# Patient Record
Sex: FEMALE | Race: WHITE | NOT HISPANIC OR LATINO | Employment: OTHER | URBAN - METROPOLITAN AREA
[De-identification: names, ages, dates, MRNs, and addresses within clinical notes are randomized per-mention and may not be internally consistent; named-entity substitution may affect disease eponyms.]

---

## 2017-01-09 ENCOUNTER — TRANSCRIBE ORDERS (OUTPATIENT)
Dept: ADMINISTRATIVE | Facility: HOSPITAL | Age: 67
End: 2017-01-09

## 2017-01-09 DIAGNOSIS — R01.1 HEART MURMUR: Primary | ICD-10-CM

## 2017-01-13 ENCOUNTER — HOSPITAL ENCOUNTER (OUTPATIENT)
Dept: NON INVASIVE DIAGNOSTICS | Facility: HOSPITAL | Age: 67
Discharge: HOME | End: 2017-01-13
Attending: INTERNAL MEDICINE
Payer: COMMERCIAL

## 2017-01-13 DIAGNOSIS — R01.1 HEART MURMUR: ICD-10-CM

## 2017-01-13 PROCEDURE — 93306 TTE W/DOPPLER COMPLETE: CPT

## 2017-02-18 ENCOUNTER — APPOINTMENT (OUTPATIENT)
Dept: LAB | Facility: HOSPITAL | Age: 67
End: 2017-02-18
Payer: COMMERCIAL

## 2017-02-18 ENCOUNTER — TRANSCRIBE ORDERS (OUTPATIENT)
Dept: ADMINISTRATIVE | Facility: HOSPITAL | Age: 67
End: 2017-02-18

## 2017-02-18 DIAGNOSIS — R73.9 HYPERGLYCEMIA: ICD-10-CM

## 2017-02-18 DIAGNOSIS — E78.5 HYPERLIPIDEMIA: ICD-10-CM

## 2017-02-18 DIAGNOSIS — Z12.31 OTHER SCREENING MAMMOGRAM: Primary | ICD-10-CM

## 2017-02-18 DIAGNOSIS — I10 ESSENTIAL (PRIMARY) HYPERTENSION: ICD-10-CM

## 2017-02-18 DIAGNOSIS — M17.11 PRIMARY OSTEOARTHRITIS OF RIGHT KNEE: ICD-10-CM

## 2017-02-18 DIAGNOSIS — E55.9 VITAMIN D DEFICIENCY: ICD-10-CM

## 2017-02-18 LAB
25(OH)D3 SERPL-MCNC: 12.9 NG/ML (ref 30–100)
ALBUMIN SERPL BCP-MCNC: 3.5 G/DL (ref 3.5–5)
ALP SERPL-CCNC: 110 U/L (ref 46–116)
ALT SERPL W P-5'-P-CCNC: 18 U/L (ref 12–78)
ANION GAP SERPL CALCULATED.3IONS-SCNC: 8 MMOL/L (ref 4–13)
AST SERPL W P-5'-P-CCNC: 16 U/L (ref 5–45)
BASOPHILS # BLD AUTO: 0.02 THOUSANDS/ΜL (ref 0–0.1)
BASOPHILS NFR BLD AUTO: 0 % (ref 0–1)
BILIRUB SERPL-MCNC: 0.4 MG/DL (ref 0.2–1)
BUN SERPL-MCNC: 20 MG/DL (ref 5–25)
CALCIUM SERPL-MCNC: 9.4 MG/DL (ref 8.3–10.1)
CHLORIDE SERPL-SCNC: 105 MMOL/L (ref 100–108)
CHOLEST SERPL-MCNC: 225 MG/DL (ref 50–200)
CO2 SERPL-SCNC: 30 MMOL/L (ref 21–32)
CREAT SERPL-MCNC: 0.95 MG/DL (ref 0.6–1.3)
EOSINOPHIL # BLD AUTO: 0.17 THOUSAND/ΜL (ref 0–0.61)
EOSINOPHIL NFR BLD AUTO: 3 % (ref 0–6)
ERYTHROCYTE [DISTWIDTH] IN BLOOD BY AUTOMATED COUNT: 13.9 % (ref 11.6–15.1)
EST. AVERAGE GLUCOSE BLD GHB EST-MCNC: 137 MG/DL
GFR SERPL CREATININE-BSD FRML MDRD: 58.5 ML/MIN/1.73SQ M
GLUCOSE SERPL-MCNC: 110 MG/DL (ref 65–140)
HBA1C MFR BLD: 6.4 % (ref 4.2–6.3)
HCT VFR BLD AUTO: 42.2 % (ref 34.8–46.1)
HDLC SERPL-MCNC: 53 MG/DL (ref 40–60)
HGB BLD-MCNC: 13.4 G/DL (ref 11.5–15.4)
LDLC SERPL CALC-MCNC: 139 MG/DL (ref 0–100)
LYMPHOCYTES # BLD AUTO: 1.46 THOUSANDS/ΜL (ref 0.6–4.47)
LYMPHOCYTES NFR BLD AUTO: 21 % (ref 14–44)
MCH RBC QN AUTO: 29.3 PG (ref 26.8–34.3)
MCHC RBC AUTO-ENTMCNC: 31.8 G/DL (ref 31.4–37.4)
MCV RBC AUTO: 92 FL (ref 82–98)
MONOCYTES # BLD AUTO: 0.46 THOUSAND/ΜL (ref 0.17–1.22)
MONOCYTES NFR BLD AUTO: 7 % (ref 4–12)
NEUTROPHILS # BLD AUTO: 4.76 THOUSANDS/ΜL (ref 1.85–7.62)
NEUTS SEG NFR BLD AUTO: 69 % (ref 43–75)
PLATELET # BLD AUTO: 284 THOUSANDS/UL (ref 149–390)
PMV BLD AUTO: 9.8 FL (ref 8.9–12.7)
POTASSIUM SERPL-SCNC: 4.6 MMOL/L (ref 3.5–5.3)
PROT SERPL-MCNC: 6.9 G/DL (ref 6.4–8.2)
RBC # BLD AUTO: 4.58 MILLION/UL (ref 3.81–5.12)
SODIUM SERPL-SCNC: 143 MMOL/L (ref 136–145)
TRIGL SERPL-MCNC: 165 MG/DL
TSH SERPL DL<=0.05 MIU/L-ACNC: 2.77 UIU/ML (ref 0.36–3.74)
WBC # BLD AUTO: 6.87 THOUSAND/UL (ref 4.31–10.16)

## 2017-02-18 PROCEDURE — 36415 COLL VENOUS BLD VENIPUNCTURE: CPT

## 2017-02-18 PROCEDURE — 80053 COMPREHEN METABOLIC PANEL: CPT

## 2017-02-18 PROCEDURE — 83036 HEMOGLOBIN GLYCOSYLATED A1C: CPT

## 2017-02-18 PROCEDURE — 80061 LIPID PANEL: CPT

## 2017-02-18 PROCEDURE — 85025 COMPLETE CBC W/AUTO DIFF WBC: CPT

## 2017-02-18 PROCEDURE — 82306 VITAMIN D 25 HYDROXY: CPT

## 2017-02-18 PROCEDURE — 84443 ASSAY THYROID STIM HORMONE: CPT

## 2017-02-19 ENCOUNTER — GENERIC CONVERSION - ENCOUNTER (OUTPATIENT)
Dept: OTHER | Facility: OTHER | Age: 67
End: 2017-02-19

## 2017-02-27 ENCOUNTER — HOSPITAL ENCOUNTER (OUTPATIENT)
Dept: MAMMOGRAPHY | Facility: HOSPITAL | Age: 67
Discharge: HOME | End: 2017-02-27
Payer: COMMERCIAL

## 2017-02-27 DIAGNOSIS — Z12.31 ENCOUNTER FOR SCREENING MAMMOGRAM FOR MALIGNANT NEOPLASM OF BREAST: ICD-10-CM

## 2017-02-27 DIAGNOSIS — Z12.31 OTHER SCREENING MAMMOGRAM: ICD-10-CM

## 2017-02-27 DIAGNOSIS — I10 ESSENTIAL (PRIMARY) HYPERTENSION: ICD-10-CM

## 2017-02-27 DIAGNOSIS — E78.5 HYPERLIPIDEMIA: ICD-10-CM

## 2017-02-27 DIAGNOSIS — G47.00 INSOMNIA: ICD-10-CM

## 2017-02-27 DIAGNOSIS — R53.83 OTHER FATIGUE: ICD-10-CM

## 2017-02-27 DIAGNOSIS — R73.02 IMPAIRED GLUCOSE TOLERANCE: ICD-10-CM

## 2017-02-27 DIAGNOSIS — E55.9 VITAMIN D DEFICIENCY: ICD-10-CM

## 2017-02-27 PROCEDURE — G0202 SCR MAMMO BI INCL CAD: HCPCS

## 2017-02-27 PROCEDURE — 77063 BREAST TOMOSYNTHESIS BI: CPT

## 2017-03-02 ENCOUNTER — GENERIC CONVERSION - ENCOUNTER (OUTPATIENT)
Dept: OTHER | Facility: OTHER | Age: 67
End: 2017-03-02

## 2017-03-06 ENCOUNTER — ALLSCRIPTS OFFICE VISIT (OUTPATIENT)
Dept: OTHER | Facility: OTHER | Age: 67
End: 2017-03-06

## 2017-03-13 LAB
LEFT EYE DIABETIC RETINOPATHY: NORMAL
RIGHT EYE DIABETIC RETINOPATHY: NORMAL

## 2017-04-03 ENCOUNTER — LAB REQUISITION (OUTPATIENT)
Dept: LAB | Facility: HOSPITAL | Age: 67
End: 2017-04-03
Payer: COMMERCIAL

## 2017-04-03 ENCOUNTER — LAB CONVERSION - ENCOUNTER (OUTPATIENT)
Dept: OTHER | Facility: OTHER | Age: 67
End: 2017-04-03

## 2017-04-03 ENCOUNTER — ALLSCRIPTS OFFICE VISIT (OUTPATIENT)
Dept: OTHER | Facility: OTHER | Age: 67
End: 2017-04-03

## 2017-04-03 DIAGNOSIS — M85.80 OTHER SPECIFIED DISORDERS OF BONE DENSITY AND STRUCTURE, UNSPECIFIED SITE: ICD-10-CM

## 2017-04-03 DIAGNOSIS — Z12.31 ENCOUNTER FOR SCREENING MAMMOGRAM FOR MALIGNANT NEOPLASM OF BREAST: ICD-10-CM

## 2017-04-03 DIAGNOSIS — R10.2 PELVIC AND PERINEAL PAIN: ICD-10-CM

## 2017-04-03 PROCEDURE — 87480 CANDIDA DNA DIR PROBE: CPT | Performed by: OBSTETRICS & GYNECOLOGY

## 2017-04-03 PROCEDURE — 87510 GARDNER VAG DNA DIR PROBE: CPT | Performed by: OBSTETRICS & GYNECOLOGY

## 2017-04-03 PROCEDURE — 87660 TRICHOMONAS VAGIN DIR PROBE: CPT | Performed by: OBSTETRICS & GYNECOLOGY

## 2017-04-04 LAB
CANDIDA RRNA VAG QL PROBE: NEGATIVE
G VAGINALIS RRNA GENITAL QL PROBE: NEGATIVE
T VAGINALIS RRNA GENITAL QL PROBE: NEGATIVE

## 2017-04-10 ENCOUNTER — GENERIC CONVERSION - ENCOUNTER (OUTPATIENT)
Dept: OTHER | Facility: OTHER | Age: 67
End: 2017-04-10

## 2017-04-19 ENCOUNTER — HOSPITAL ENCOUNTER (OUTPATIENT)
Dept: ULTRASOUND IMAGING | Facility: HOSPITAL | Age: 67
Discharge: HOME | End: 2017-04-19
Attending: OBSTETRICS & GYNECOLOGY
Payer: COMMERCIAL

## 2017-04-19 DIAGNOSIS — R10.2 PELVIC AND PERINEAL PAIN: ICD-10-CM

## 2017-04-19 PROCEDURE — 76856 US EXAM PELVIC COMPLETE: CPT

## 2017-04-19 PROCEDURE — 76830 TRANSVAGINAL US NON-OB: CPT

## 2017-06-01 ENCOUNTER — ALLSCRIPTS OFFICE VISIT (OUTPATIENT)
Dept: OTHER | Facility: OTHER | Age: 67
End: 2017-06-01

## 2017-06-01 PROCEDURE — 88312 SPECIAL STAINS GROUP 1: CPT | Performed by: OBSTETRICS & GYNECOLOGY

## 2017-06-01 PROCEDURE — 88342 IMHCHEM/IMCYTCHM 1ST ANTB: CPT | Performed by: OBSTETRICS & GYNECOLOGY

## 2017-06-01 PROCEDURE — 88305 TISSUE EXAM BY PATHOLOGIST: CPT | Performed by: OBSTETRICS & GYNECOLOGY

## 2017-06-02 ENCOUNTER — LAB REQUISITION (OUTPATIENT)
Dept: LAB | Facility: HOSPITAL | Age: 67
End: 2017-06-02
Payer: COMMERCIAL

## 2017-06-02 DIAGNOSIS — R93.89 ABNORMAL FINDINGS ON DIAGNOSTIC IMAGING OF OTHER SPECIFIED BODY STRUCTURES: ICD-10-CM

## 2017-06-12 ENCOUNTER — GENERIC CONVERSION - ENCOUNTER (OUTPATIENT)
Dept: OTHER | Facility: OTHER | Age: 67
End: 2017-06-12

## 2017-06-23 ENCOUNTER — ALLSCRIPTS OFFICE VISIT (OUTPATIENT)
Dept: OTHER | Facility: OTHER | Age: 67
End: 2017-06-23

## 2017-08-08 ENCOUNTER — HOSPITAL ENCOUNTER (OUTPATIENT)
Dept: BONE DENSITY | Facility: HOSPITAL | Age: 67
Discharge: HOME | End: 2017-08-08
Attending: OBSTETRICS & GYNECOLOGY
Payer: COMMERCIAL

## 2017-08-08 DIAGNOSIS — M85.80 OTHER SPECIFIED DISORDERS OF BONE DENSITY AND STRUCTURE, UNSPECIFIED SITE: ICD-10-CM

## 2017-08-08 PROCEDURE — 77080 DXA BONE DENSITY AXIAL: CPT

## 2017-08-10 ENCOUNTER — GENERIC CONVERSION - ENCOUNTER (OUTPATIENT)
Dept: OTHER | Facility: OTHER | Age: 67
End: 2017-08-10

## 2017-08-11 ENCOUNTER — TRANSCRIBE ORDERS (OUTPATIENT)
Dept: ADMINISTRATIVE | Facility: HOSPITAL | Age: 67
End: 2017-08-11

## 2017-08-11 ENCOUNTER — ANESTHESIA EVENT (OUTPATIENT)
Dept: PERIOP | Facility: HOSPITAL | Age: 67
End: 2017-08-11
Payer: COMMERCIAL

## 2017-08-11 ENCOUNTER — ALLSCRIPTS OFFICE VISIT (OUTPATIENT)
Dept: OTHER | Facility: OTHER | Age: 67
End: 2017-08-11

## 2017-08-14 ENCOUNTER — TRANSCRIBE ORDERS (OUTPATIENT)
Dept: ADMINISTRATIVE | Facility: HOSPITAL | Age: 67
End: 2017-08-14

## 2017-08-14 ENCOUNTER — APPOINTMENT (OUTPATIENT)
Dept: LAB | Facility: HOSPITAL | Age: 67
End: 2017-08-14
Payer: COMMERCIAL

## 2017-08-14 DIAGNOSIS — Z00.8 ENCOUNTER FOR OTHER GENERAL EXAMINATION: Primary | ICD-10-CM

## 2017-08-14 DIAGNOSIS — Z00.8 ENCOUNTER FOR OTHER GENERAL EXAMINATION: ICD-10-CM

## 2017-08-14 LAB
CHOLEST SERPL-MCNC: 199 MG/DL (ref 50–200)
EST. AVERAGE GLUCOSE BLD GHB EST-MCNC: 146 MG/DL
HBA1C MFR BLD: 6.7 % (ref 4.2–6.3)
HDLC SERPL-MCNC: 52 MG/DL (ref 40–60)
LDLC SERPL CALC-MCNC: 126 MG/DL (ref 0–100)
TRIGL SERPL-MCNC: 107 MG/DL

## 2017-08-14 PROCEDURE — 36415 COLL VENOUS BLD VENIPUNCTURE: CPT

## 2017-08-14 PROCEDURE — 80061 LIPID PANEL: CPT

## 2017-08-14 PROCEDURE — 83036 HEMOGLOBIN GLYCOSYLATED A1C: CPT

## 2017-08-28 ENCOUNTER — HOSPITAL ENCOUNTER (OUTPATIENT)
Facility: HOSPITAL | Age: 67
Setting detail: OUTPATIENT SURGERY
Discharge: HOME | End: 2017-08-29
Attending: OBSTETRICS & GYNECOLOGY | Admitting: OBSTETRICS & GYNECOLOGY
Payer: COMMERCIAL

## 2017-08-28 ENCOUNTER — ANESTHESIA (OUTPATIENT)
Dept: PERIOP | Facility: HOSPITAL | Age: 67
End: 2017-08-28
Payer: COMMERCIAL

## 2017-08-28 DIAGNOSIS — R93.89 THICKENED ENDOMETRIUM: ICD-10-CM

## 2017-08-28 DIAGNOSIS — N85.01 COMPLEX ENDOMETRIAL HYPERPLASIA: ICD-10-CM

## 2017-08-28 LAB
ABO GROUP BLD: NORMAL
ANION GAP SERPL CALCULATED.3IONS-SCNC: 7 MMOL/L (ref 4–13)
BASOPHILS # BLD AUTO: 0.01 THOUSANDS/ΜL (ref 0–0.1)
BASOPHILS NFR BLD AUTO: 0 % (ref 0–1)
BLD GP AB SCN SERPL QL: NEGATIVE
BUN SERPL-MCNC: 15 MG/DL (ref 5–25)
CALCIUM SERPL-MCNC: 9.3 MG/DL (ref 8.3–10.1)
CHLORIDE SERPL-SCNC: 104 MMOL/L (ref 100–108)
CO2 SERPL-SCNC: 29 MMOL/L (ref 21–32)
CREAT SERPL-MCNC: 0.94 MG/DL (ref 0.6–1.3)
EOSINOPHIL # BLD AUTO: 0.09 THOUSAND/ΜL (ref 0–0.61)
EOSINOPHIL NFR BLD AUTO: 1 % (ref 0–6)
ERYTHROCYTE [DISTWIDTH] IN BLOOD BY AUTOMATED COUNT: 13.5 % (ref 11.6–15.1)
GFR SERPL CREATININE-BSD FRML MDRD: 62 ML/MIN/1.73SQ M
GLUCOSE P FAST SERPL-MCNC: 101 MG/DL (ref 65–99)
GLUCOSE SERPL-MCNC: 101 MG/DL (ref 65–140)
GLUCOSE SERPL-MCNC: 96 MG/DL (ref 65–140)
HCT VFR BLD AUTO: 38.1 % (ref 34.8–46.1)
HGB BLD-MCNC: 12.8 G/DL (ref 11.5–15.4)
LYMPHOCYTES # BLD AUTO: 1.26 THOUSANDS/ΜL (ref 0.6–4.47)
LYMPHOCYTES NFR BLD AUTO: 20 % (ref 14–44)
MCH RBC QN AUTO: 30.6 PG (ref 26.8–34.3)
MCHC RBC AUTO-ENTMCNC: 33.6 G/DL (ref 31.4–37.4)
MCV RBC AUTO: 91 FL (ref 82–98)
MONOCYTES # BLD AUTO: 0.4 THOUSAND/ΜL (ref 0.17–1.22)
MONOCYTES NFR BLD AUTO: 6 % (ref 4–12)
NEUTROPHILS # BLD AUTO: 4.51 THOUSANDS/ΜL (ref 1.85–7.62)
NEUTS SEG NFR BLD AUTO: 73 % (ref 43–75)
NRBC BLD AUTO-RTO: 0 /100 WBCS
PLATELET # BLD AUTO: 239 THOUSANDS/UL (ref 149–390)
PMV BLD AUTO: 10 FL (ref 8.9–12.7)
POTASSIUM SERPL-SCNC: 4 MMOL/L (ref 3.5–5.3)
RBC # BLD AUTO: 4.18 MILLION/UL (ref 3.81–5.12)
RH BLD: POSITIVE
SODIUM SERPL-SCNC: 140 MMOL/L (ref 136–145)
SPECIMEN EXPIRATION DATE: NORMAL
WBC # BLD AUTO: 6.27 THOUSAND/UL (ref 4.31–10.16)

## 2017-08-28 PROCEDURE — 86850 RBC ANTIBODY SCREEN: CPT | Performed by: NURSE ANESTHETIST, CERTIFIED REGISTERED

## 2017-08-28 PROCEDURE — 80048 BASIC METABOLIC PNL TOTAL CA: CPT | Performed by: ANESTHESIOLOGY

## 2017-08-28 PROCEDURE — 86900 BLOOD TYPING SEROLOGIC ABO: CPT | Performed by: NURSE ANESTHETIST, CERTIFIED REGISTERED

## 2017-08-28 PROCEDURE — 88307 TISSUE EXAM BY PATHOLOGIST: CPT | Performed by: OBSTETRICS & GYNECOLOGY

## 2017-08-28 PROCEDURE — 85025 COMPLETE CBC W/AUTO DIFF WBC: CPT | Performed by: OBSTETRICS & GYNECOLOGY

## 2017-08-28 PROCEDURE — 86901 BLOOD TYPING SEROLOGIC RH(D): CPT | Performed by: NURSE ANESTHETIST, CERTIFIED REGISTERED

## 2017-08-28 PROCEDURE — 82948 REAGENT STRIP/BLOOD GLUCOSE: CPT

## 2017-08-28 RX ORDER — DOCUSATE SODIUM 100 MG/1
100 CAPSULE, LIQUID FILLED ORAL 2 TIMES DAILY
Status: DISCONTINUED | OUTPATIENT
Start: 2017-08-28 | End: 2017-08-29 | Stop reason: HOSPADM

## 2017-08-28 RX ORDER — HYDROMORPHONE HCL 110MG/55ML
1 PATIENT CONTROLLED ANALGESIA SYRINGE INTRAVENOUS
Status: DISCONTINUED | OUTPATIENT
Start: 2017-08-28 | End: 2017-08-29 | Stop reason: HOSPADM

## 2017-08-28 RX ORDER — ONDANSETRON 2 MG/ML
4 INJECTION INTRAMUSCULAR; INTRAVENOUS EVERY 6 HOURS PRN
Status: DISCONTINUED | OUTPATIENT
Start: 2017-08-28 | End: 2017-08-29 | Stop reason: HOSPADM

## 2017-08-28 RX ORDER — MAGNESIUM HYDROXIDE 1200 MG/15ML
LIQUID ORAL AS NEEDED
Status: DISCONTINUED | OUTPATIENT
Start: 2017-08-28 | End: 2017-08-28 | Stop reason: HOSPADM

## 2017-08-28 RX ORDER — SODIUM CHLORIDE, SODIUM LACTATE, POTASSIUM CHLORIDE, CALCIUM CHLORIDE 600; 310; 30; 20 MG/100ML; MG/100ML; MG/100ML; MG/100ML
125 INJECTION, SOLUTION INTRAVENOUS CONTINUOUS
Status: DISCONTINUED | OUTPATIENT
Start: 2017-08-28 | End: 2017-08-28

## 2017-08-28 RX ORDER — HYDROCHLOROTHIAZIDE 25 MG/1
25 TABLET ORAL DAILY
Status: DISCONTINUED | OUTPATIENT
Start: 2017-08-29 | End: 2017-08-28

## 2017-08-28 RX ORDER — ROCURONIUM BROMIDE 10 MG/ML
INJECTION, SOLUTION INTRAVENOUS AS NEEDED
Status: DISCONTINUED | OUTPATIENT
Start: 2017-08-28 | End: 2017-08-28 | Stop reason: SURG

## 2017-08-28 RX ORDER — OXYCODONE HYDROCHLORIDE AND ACETAMINOPHEN 5; 325 MG/1; MG/1
1 TABLET ORAL EVERY 4 HOURS PRN
Qty: 30 TABLET | Refills: 0 | Status: SHIPPED | OUTPATIENT
Start: 2017-08-28 | End: 2017-09-07

## 2017-08-28 RX ORDER — IBUPROFEN 600 MG/1
600 TABLET ORAL EVERY 6 HOURS PRN
Status: DISCONTINUED | OUTPATIENT
Start: 2017-08-28 | End: 2017-08-29 | Stop reason: HOSPADM

## 2017-08-28 RX ORDER — SCOLOPAMINE TRANSDERMAL SYSTEM 1 MG/1
1 PATCH, EXTENDED RELEASE TRANSDERMAL ONCE AS NEEDED
Status: DISCONTINUED | OUTPATIENT
Start: 2017-08-28 | End: 2017-08-28

## 2017-08-28 RX ORDER — EPHEDRINE SULFATE 50 MG/ML
INJECTION, SOLUTION INTRAVENOUS AS NEEDED
Status: DISCONTINUED | OUTPATIENT
Start: 2017-08-28 | End: 2017-08-28 | Stop reason: SURG

## 2017-08-28 RX ORDER — GLYCOPYRROLATE 0.2 MG/ML
INJECTION INTRAMUSCULAR; INTRAVENOUS AS NEEDED
Status: DISCONTINUED | OUTPATIENT
Start: 2017-08-28 | End: 2017-08-28 | Stop reason: SURG

## 2017-08-28 RX ORDER — BUPIVACAINE HYDROCHLORIDE 2.5 MG/ML
INJECTION, SOLUTION EPIDURAL; INFILTRATION; INTRACAUDAL AS NEEDED
Status: DISCONTINUED | OUTPATIENT
Start: 2017-08-28 | End: 2017-08-28 | Stop reason: HOSPADM

## 2017-08-28 RX ORDER — OXYCODONE HYDROCHLORIDE AND ACETAMINOPHEN 5; 325 MG/1; MG/1
2 TABLET ORAL EVERY 4 HOURS PRN
Status: DISCONTINUED | OUTPATIENT
Start: 2017-08-28 | End: 2017-08-29 | Stop reason: HOSPADM

## 2017-08-28 RX ORDER — OXYCODONE HYDROCHLORIDE AND ACETAMINOPHEN 5; 325 MG/1; MG/1
1 TABLET ORAL EVERY 4 HOURS PRN
Status: DISCONTINUED | OUTPATIENT
Start: 2017-08-28 | End: 2017-08-29 | Stop reason: HOSPADM

## 2017-08-28 RX ORDER — SODIUM CHLORIDE 9 MG/ML
125 INJECTION, SOLUTION INTRAVENOUS CONTINUOUS
Status: DISCONTINUED | OUTPATIENT
Start: 2017-08-28 | End: 2017-08-28

## 2017-08-28 RX ORDER — MIDAZOLAM HYDROCHLORIDE 1 MG/ML
INJECTION INTRAMUSCULAR; INTRAVENOUS AS NEEDED
Status: DISCONTINUED | OUTPATIENT
Start: 2017-08-28 | End: 2017-08-28 | Stop reason: SURG

## 2017-08-28 RX ORDER — FENTANYL CITRATE 50 UG/ML
INJECTION, SOLUTION INTRAMUSCULAR; INTRAVENOUS AS NEEDED
Status: DISCONTINUED | OUTPATIENT
Start: 2017-08-28 | End: 2017-08-28 | Stop reason: SURG

## 2017-08-28 RX ORDER — SIMETHICONE 80 MG
80 TABLET,CHEWABLE ORAL EVERY 6 HOURS PRN
Status: DISCONTINUED | OUTPATIENT
Start: 2017-08-28 | End: 2017-08-29 | Stop reason: HOSPADM

## 2017-08-28 RX ORDER — OXYCODONE HYDROCHLORIDE AND ACETAMINOPHEN 5; 325 MG/1; MG/1
1 TABLET ORAL EVERY 4 HOURS PRN
Status: DISCONTINUED | OUTPATIENT
Start: 2017-08-28 | End: 2017-08-28

## 2017-08-28 RX ORDER — ONDANSETRON 2 MG/ML
4 INJECTION INTRAMUSCULAR; INTRAVENOUS EVERY 6 HOURS PRN
Status: DISCONTINUED | OUTPATIENT
Start: 2017-08-28 | End: 2017-08-28 | Stop reason: HOSPADM

## 2017-08-28 RX ORDER — ONDANSETRON 2 MG/ML
INJECTION INTRAMUSCULAR; INTRAVENOUS AS NEEDED
Status: DISCONTINUED | OUTPATIENT
Start: 2017-08-28 | End: 2017-08-28 | Stop reason: SURG

## 2017-08-28 RX ORDER — HYDROMORPHONE HYDROCHLORIDE 2 MG/ML
INJECTION, SOLUTION INTRAMUSCULAR; INTRAVENOUS; SUBCUTANEOUS AS NEEDED
Status: DISCONTINUED | OUTPATIENT
Start: 2017-08-28 | End: 2017-08-28 | Stop reason: SURG

## 2017-08-28 RX ORDER — CLINDAMYCIN PHOSPHATE 900 MG/50ML
900 INJECTION INTRAVENOUS ONCE
Status: COMPLETED | OUTPATIENT
Start: 2017-08-28 | End: 2017-08-28

## 2017-08-28 RX ORDER — FENTANYL CITRATE 50 UG/ML
50 INJECTION, SOLUTION INTRAMUSCULAR; INTRAVENOUS
Status: DISCONTINUED | OUTPATIENT
Start: 2017-08-28 | End: 2017-08-28 | Stop reason: HOSPADM

## 2017-08-28 RX ORDER — ONDANSETRON 2 MG/ML
4 INJECTION INTRAMUSCULAR; INTRAVENOUS EVERY 4 HOURS PRN
Status: DISCONTINUED | OUTPATIENT
Start: 2017-08-28 | End: 2017-08-29 | Stop reason: HOSPADM

## 2017-08-28 RX ORDER — PROPOFOL 10 MG/ML
INJECTION, EMULSION INTRAVENOUS AS NEEDED
Status: DISCONTINUED | OUTPATIENT
Start: 2017-08-28 | End: 2017-08-28 | Stop reason: SURG

## 2017-08-28 RX ORDER — OXYCODONE HYDROCHLORIDE 5 MG/1
10 TABLET ORAL EVERY 4 HOURS PRN
Status: DISCONTINUED | OUTPATIENT
Start: 2017-08-28 | End: 2017-08-28

## 2017-08-28 RX ORDER — LIDOCAINE HYDROCHLORIDE 10 MG/ML
INJECTION, SOLUTION INFILTRATION; PERINEURAL AS NEEDED
Status: DISCONTINUED | OUTPATIENT
Start: 2017-08-28 | End: 2017-08-28 | Stop reason: SURG

## 2017-08-28 RX ORDER — ATORVASTATIN CALCIUM 20 MG/1
20 TABLET, FILM COATED ORAL
Status: DISCONTINUED | OUTPATIENT
Start: 2017-08-28 | End: 2017-08-29 | Stop reason: HOSPADM

## 2017-08-28 RX ADMIN — FENTANYL CITRATE 50 MCG: 50 INJECTION, SOLUTION INTRAMUSCULAR; INTRAVENOUS at 09:45

## 2017-08-28 RX ADMIN — CLINDAMYCIN PHOSPHATE 900 MG: 18 INJECTION, SOLUTION INTRAMUSCULAR; INTRAVENOUS at 09:22

## 2017-08-28 RX ADMIN — GENTAMICIN SULFATE 90 MG: 40 INJECTION, SOLUTION INTRAMUSCULAR; INTRAVENOUS at 09:37

## 2017-08-28 RX ADMIN — LIDOCAINE HYDROCHLORIDE 100 MG: 10 INJECTION, SOLUTION INFILTRATION; PERINEURAL at 09:13

## 2017-08-28 RX ADMIN — MIDAZOLAM HYDROCHLORIDE 2 MG: 1 INJECTION, SOLUTION INTRAMUSCULAR; INTRAVENOUS at 09:04

## 2017-08-28 RX ADMIN — HYDROMORPHONE HYDROCHLORIDE 0.5 MG: 2 INJECTION, SOLUTION INTRAMUSCULAR; INTRAVENOUS; SUBCUTANEOUS at 11:08

## 2017-08-28 RX ADMIN — GLYCOPYRROLATE 0.8 MG: 0.2 INJECTION, SOLUTION INTRAMUSCULAR; INTRAVENOUS at 11:46

## 2017-08-28 RX ADMIN — ONDANSETRON HYDROCHLORIDE 4 MG: 2 INJECTION, SOLUTION INTRAVENOUS at 09:35

## 2017-08-28 RX ADMIN — ROCURONIUM BROMIDE 50 MG: 10 INJECTION, SOLUTION INTRAVENOUS at 09:15

## 2017-08-28 RX ADMIN — EPHEDRINE SULFATE 5 MG: 50 INJECTION, SOLUTION INTRAMUSCULAR; INTRAVENOUS; SUBCUTANEOUS at 11:13

## 2017-08-28 RX ADMIN — SODIUM CHLORIDE: 0.9 INJECTION, SOLUTION INTRAVENOUS at 11:28

## 2017-08-28 RX ADMIN — FENTANYL CITRATE 100 MCG: 50 INJECTION, SOLUTION INTRAMUSCULAR; INTRAVENOUS at 09:13

## 2017-08-28 RX ADMIN — SODIUM CHLORIDE: 0.9 INJECTION, SOLUTION INTRAVENOUS at 09:38

## 2017-08-28 RX ADMIN — NEOSTIGMINE METHYLSULFATE 4 MG: 1 INJECTION, SOLUTION INTRAMUSCULAR; INTRAVENOUS; SUBCUTANEOUS at 11:46

## 2017-08-28 RX ADMIN — SCOPALAMINE 1 PATCH: 1 PATCH, EXTENDED RELEASE TRANSDERMAL at 08:12

## 2017-08-28 RX ADMIN — SODIUM CHLORIDE, SODIUM LACTATE, POTASSIUM CHLORIDE, AND CALCIUM CHLORIDE 125 ML/HR: .6; .31; .03; .02 INJECTION, SOLUTION INTRAVENOUS at 14:56

## 2017-08-28 RX ADMIN — SODIUM CHLORIDE 125 ML/HR: 0.9 INJECTION, SOLUTION INTRAVENOUS at 08:15

## 2017-08-28 RX ADMIN — PROPOFOL 200 MG: 10 INJECTION, EMULSION INTRAVENOUS at 09:14

## 2017-08-28 RX ADMIN — DEXAMETHASONE SODIUM PHOSPHATE 8 MG: 10 INJECTION INTRAMUSCULAR; INTRAVENOUS at 09:35

## 2017-08-28 RX ADMIN — ROCURONIUM BROMIDE 20 MG: 10 INJECTION, SOLUTION INTRAVENOUS at 10:31

## 2017-08-28 RX ADMIN — FENTANYL CITRATE 50 MCG: 50 INJECTION, SOLUTION INTRAMUSCULAR; INTRAVENOUS at 10:48

## 2017-08-29 VITALS
DIASTOLIC BLOOD PRESSURE: 67 MMHG | HEIGHT: 65 IN | OXYGEN SATURATION: 98 % | RESPIRATION RATE: 17 BRPM | TEMPERATURE: 98.8 F | SYSTOLIC BLOOD PRESSURE: 113 MMHG | WEIGHT: 220 LBS | HEART RATE: 79 BPM | BODY MASS INDEX: 36.65 KG/M2

## 2017-08-29 LAB
ANION GAP SERPL CALCULATED.3IONS-SCNC: 6 MMOL/L (ref 4–13)
BUN SERPL-MCNC: 11 MG/DL (ref 5–25)
CALCIUM SERPL-MCNC: 8.6 MG/DL (ref 8.3–10.1)
CHLORIDE SERPL-SCNC: 107 MMOL/L (ref 100–108)
CO2 SERPL-SCNC: 31 MMOL/L (ref 21–32)
CREAT SERPL-MCNC: 1.18 MG/DL (ref 0.6–1.3)
ERYTHROCYTE [DISTWIDTH] IN BLOOD BY AUTOMATED COUNT: 13.7 % (ref 11.6–15.1)
GFR SERPL CREATININE-BSD FRML MDRD: 47 ML/MIN/1.73SQ M
GLUCOSE SERPL-MCNC: 102 MG/DL (ref 65–140)
HCT VFR BLD AUTO: 33.6 % (ref 34.8–46.1)
HGB BLD-MCNC: 11.2 G/DL (ref 11.5–15.4)
MCH RBC QN AUTO: 30.6 PG (ref 26.8–34.3)
MCHC RBC AUTO-ENTMCNC: 33.3 G/DL (ref 31.4–37.4)
MCV RBC AUTO: 92 FL (ref 82–98)
PLATELET # BLD AUTO: 230 THOUSANDS/UL (ref 149–390)
PMV BLD AUTO: 9.7 FL (ref 8.9–12.7)
POTASSIUM SERPL-SCNC: 4 MMOL/L (ref 3.5–5.3)
RBC # BLD AUTO: 3.66 MILLION/UL (ref 3.81–5.12)
SODIUM SERPL-SCNC: 144 MMOL/L (ref 136–145)
WBC # BLD AUTO: 12.48 THOUSAND/UL (ref 4.31–10.16)

## 2017-08-29 PROCEDURE — 85027 COMPLETE CBC AUTOMATED: CPT | Performed by: OBSTETRICS & GYNECOLOGY

## 2017-08-29 PROCEDURE — 80048 BASIC METABOLIC PNL TOTAL CA: CPT | Performed by: OBSTETRICS & GYNECOLOGY

## 2017-08-29 RX ORDER — OXYCODONE HYDROCHLORIDE AND ACETAMINOPHEN 5; 325 MG/1; MG/1
1 TABLET ORAL EVERY 4 HOURS PRN
Qty: 15 TABLET | Refills: 0 | Status: SHIPPED | OUTPATIENT
Start: 2017-08-29 | End: 2017-08-29

## 2017-08-29 RX ORDER — IBUPROFEN 600 MG/1
600 TABLET ORAL EVERY 6 HOURS PRN
Qty: 30 TABLET | Refills: 0 | Status: SHIPPED | OUTPATIENT
Start: 2017-08-29 | End: 2018-07-12

## 2017-08-29 RX ORDER — OXYCODONE HYDROCHLORIDE AND ACETAMINOPHEN 5; 325 MG/1; MG/1
1-2 TABLET ORAL EVERY 4 HOURS PRN
Qty: 15 TABLET | Refills: 0 | Status: SHIPPED | OUTPATIENT
Start: 2017-08-29 | End: 2017-09-08

## 2017-08-29 RX ADMIN — MENTHOL 5.4 MG: 5.4 LOZENGE ORAL at 00:22

## 2017-08-29 RX ADMIN — DOCUSATE SODIUM 100 MG: 100 CAPSULE, LIQUID FILLED ORAL at 12:11

## 2017-09-12 ENCOUNTER — ALLSCRIPTS OFFICE VISIT (OUTPATIENT)
Dept: OTHER | Facility: OTHER | Age: 67
End: 2017-09-12

## 2018-01-10 NOTE — PROGRESS NOTES
Assessment    1  Insomnia (780 52) (G47 00)   2  Dyslipidemia (272 4) (E78 5)   3  Fatigue (780 79) (R53 83)   4  Accelerated essential hypertension (401 0) (I10)   5  Impaired glucose tolerance (790 22) (R73 02)    Plan  Accelerated essential hypertension    · A diet low in sodium and high in potassium, magnesium, and calcium can help your  blood pressure ; Status:Complete;   Done: 33JPS6434   · Begin a limited exercise program ; Status:Complete;   Done: 15BBV1731   · Eat a low fat and low cholesterol diet ; Status:Complete;   Done: 16TIJ5843   · We encourage you to begin to make lifestyle changes to help control your blood  pressure  These may include losing weight, increasing your activity level, limiting salt in  your diet, decreasing alcohol intake, and eating a diet low in fat and rich in fruits  and vegetables ; Status:Complete;   Done: 36HHL8780   · Call [phone] if: You become dizzy or lightheaded, especially when you stand up  after sitting for a while ; Status:Complete;   Done: 69PEG8644   · Call [phone] if: You develop double vision (see two of everything) ;  Status:Complete;   Done: 94NXV6313   · Call 911 if: You experience a new kind of chest pain (angina) or pressure ;  Status:Complete;   Done: 89NAN2067   · Call 911 if: You have any symptoms of a stroke ; Status:Complete;   Done: 43NYX4574   · Seek Immediate Medical Attention if: You have a severe headache that will not go away ;  Status:Complete;   Done: 27WZR1485   · Seek Immediate Medical Attention if: Your blood pressure is greater than 250/120 for 2  consecutive readings ; Status:Complete;   Done: 01RMO4062  Accelerated essential hypertension, Dyslipidemia, Fatigue, Impaired glucose tolerance,  Insomnia, Vitamin D deficiency    · (1) CBC/PLT/DIFF; Status:Active; Requested for:07Mar2017;    · (1) COMPREHENSIVE METABOLIC PANEL; Status:Active; Requested for:07Mar2017;    · (1) HEMOGLOBIN A1C; Status:Active;  Requested for:07Mar2017; · (1) LIPID PANEL, FASTING; Status:Active; Requested for:07Mar2017;    · (1) TSH; Status:Active; Requested for:07Mar2017;    · (1) VITAMIN D 25-HYDROXY; Status:Active; Requested for:07Mar2017;   Dyslipidemia    · Begin or continue regular aerobic exercise  Gradually work up to at least 3 sessions of  30 minutes of exercise a week ; Status:Complete;   Done: 60PFC8348   · Eat no more than 30 grams of fat per day ; Status:Complete;   Done: 98ZQQ8772   · There are many exercise options for seniors ; Status:Complete;   Done: 13PBV8197   · Call [phone] if: You have muscle cramps ; Status:Complete;   Done: 77QZK6816   · Call [phone] if: You have pain in the stomach area ; Status:Complete;   Done:  29GRD7838   · Call [phone] if: You start vomiting ; Status:Complete;   Done: 32ERK7083  Insomnia    · Mirtazapine 15 MG Oral Tablet; TAKE 1 TABLET AT BEDTIME  Screening for depression    · *VB-Depression Screening; Status:Complete;   Done: 43DRF0352 02:44PM  Unlinked    · *VB - Fall Risk Assessment  (Dx Z13 89 Screen for Neurologic Disorder); Status:Need  Information - Billable Problem; Requested for:06Mar2017;    · *VB - Urinary Incontinence Screen (Dx Z13 89 Screen for UI); Status:Need Information -  Problem; Requested for:06Mar2017;     Discussion/Summary    Reviewed recent laboratory testing with her  Vitamin D level was significantly low and she has been tolerating the high-dose vitamin D that was sent in at that point  Advised her to take this on a weekly basis for at least 4 months and will recheck prior to her next visit  Cholesterol remains significantly elevated  Stressed to her the importance of taking the Lipitor on a daily basis  She continues to take the CoQ10 with this as advised by Dr Ina Mitchell  Blood pressure relatively well controlled but this is likely being affected at present by her stress level  Salt intake  Continue the valsartan/hydrochlorothiazide as previously   Stress level and lack of sleep are definitely affecting her present  We'll start with Remeron as noted above  Hopefully this will help her get a better night sleep and maybe help with her stress level as well  Hemoglobin A1c borderline at present  Discussed with her trying to watch her carbohydrates  Increase activity level if possible  We'll have her follow-up in about 3-4 months with laboratory testing prior to that visit  Follow-up sooner if needed  The patient has the current Goals: Improvement in sleep and stress level  The patent has the current Barriers: Recent stressors at work  Chief Complaint  CC patient here today for regular visit to review blood work  no refills needed at this time  History of Present Illness  She presents for routine follow-up and to review recent laboratory testing  Has generally been doing well except for some increased stress at work  This has been affecting her especially her sleep habits  Has noticed that she is sleeping more poorly and feels increasingly tired  Has difficulty falling asleep and staying asleep  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  Sleep is often broken up and will often only sleep about 4-5 hours total  Much more tired  Has been trying to watch her diet but it has been more difficult  Tolerating her blood pressure medication without difficulty  Denies any headaches or localized weakness  Denies any chest pain or palpitations  Denies any significant shortness of breath  Has followed up with cardiology relatively recently  Continues with the atorvastatin and has not had difficulty with this  After her last laboratory testing she was started on the high-dose vitamin D and has been taking this without difficulty  He'll with some joint symptoms especially over her knees but is tolerating this relatively well  Denies any polyuria, polydipsia, or polyphagia  Denies any recent vision changes  Review of Systems    Constitutional: feeling tired     Eyes: no eyesight problems  Cardiovascular: as noted in HPI  Respiratory: as noted in HPI  Gastrointestinal: no nausea and no diarrhea  Musculoskeletal: joint stiffness  Neurological: no headache  Psychiatric: sleep disturbances, but as noted in HPI  ROS reviewed  Active Problems    1  Accelerated essential hypertension (401 0) (I10)   2  Dyslipidemia (272 4) (E78 5)   3  Encounter for screening mammogram for malignant neoplasm of breast (V76 12)   (Z12 31)   4  Fatigue (780 79) (R53 83)   5  Impaired glucose tolerance (790 22) (R73 02)   6  Need for pneumococcal vaccination (V03 82) (Z23)   7  Obesity (278 00) (E66 9)   8  Osteopenia (733 90) (M85 80)   9  Primary osteoarthritis of right knee (715 16) (M17 11)   10  Snoring (786 09) (R06 83)   11  Systolic ejection murmur (785 2) (R01 1)   12  Tendonitis of left hip (727 09) (M76 892)   13  Varicose Veins Of Lower Extremities (454 9)   14  Vitamin D deficiency (268 9) (E55 9)    Past Medical History    1  History of Carpal tunnel syndrome, unspecified laterality (354 0) (G56 00)   2  History of Decreased body height (781 91) (R29 890)   3  History of infectious mononucleosis (V12 09) (Z86 19)   4  History of strain of rotator cuff (V13 59) (Z87 828)   5  History of Knee stiffness, left (719 56) (M25 662)   6  History of Sprain of right acromioclavicular joint (840 0) (S43 51XA)   7  History of Sprain of shoulder, right (840 9) (S43 401A)   8  History of Tracheobronchitis (490) (J40)    The active problems and past medical history were reviewed and updated today  Surgical History    1  History of  Section   2  History of Complete Colonoscopy   3  History of Neuroplasty Decompression Median Nerve At Carpal Tunnel    The surgical history was reviewed and updated today  Family History  Mother    1  Family history of Arthritis (V17 7)   2  Family history of Hypertension (V17 49)   3   Family history of Stroke Syndrome (V17 1)  Father 4  Family history of Diabetes Mellitus (V18 0)   5  Family history of Sudden/Instantaneous Cardiac Death (V17 41)  Sister    6  Family history of Pure Hypercholesterolemia  Brother    7  Family history of Sudden/Instantaneous Cardiac Death (V17 41)    The family history was reviewed and updated today  Social History    · Denied: History of Drug Use   · Marital History - Currently    · Never A Smoker   · Never Drank Alcohol   · Stress at work (V62 1) (Z56 6)  The social history was reviewed and updated today  Current Meds   1  Atorvastatin Calcium 20 MG Oral Tablet; TAKE 1 TABLET DAILY; Therapy: 40WOR0570 to (Demi Akhtar)  Requested for: 58KJW2835; Last   Rx:41Int9404 Ordered   2  CoQ-10 CAPS; TAKE T,TH,S,S ALTERNATING WITH LIPITOR; Therapy: (Recorded:20Jun2016) to Recorded   3  Daily Multivitamin TABS; TAKE 1 TABLET DAILY; Therapy: (Recorded:06Mar2017) to Recorded   4  Oracea 40 MG Oral Capsule Delayed Release; TAKE 1 CAPSULE DAILY EVERY   MORNING BEFORE BREAKFAST; Therapy: (Recorded:29Ghs6251) to Recorded   5  Valsartan-Hydrochlorothiazide 320-25 MG Oral Tablet; TAKE 1 TABLET DAILY  Requested   for: 76SOI8740; Last Rx:95Gih9029 Ordered   6  Vitamin D (Ergocalciferol) 65643 UNIT Oral Capsule; TAKE 1 CAPSULE WEEKLY; Therapy: 35DUU1855 to (Last Rx:49Ils2123)  Requested for: 30Qcw2361 Ordered   7  Voltaren 1 % Transdermal Gel; APPLY 2 GM OF GEL TO AFFECTED AREA 3 TIMES DAILY   AS NEEDED  DO NOT APPLY MORE THAN 8 GM DAILY; Therapy: 22TDZ5563 to (Last Tomasa Best)  Requested for: 20Jun2016 Ordered    The medication list was reviewed and updated today  Allergies    1   Penicillins    Vitals  Vital Signs    Recorded: 71OXR6923 02:38PM   Temperature 96 6 F, Tympanic   Heart Rate 71   Respiration 16   Systolic 940, LUE, Sitting   Diastolic 72, LUE, Sitting   BP CUFF SIZE Large   Height 5 ft 4 in   Weight 223 lb    BMI Calculated 38 28   BSA Calculated 2 05   O2 Saturation 98 Physical Exam    Constitutional   General appearance: Abnormal   appears tired, well hydrated and appears younger than stated age  Eyes   Conjunctiva and lids: No swelling, erythema or discharge  Ears, Nose, Mouth, and Throat   External inspection of ears and nose: Normal     Otoscopic examination: Tympanic membranes translucent with normal light reflex  Canals patent without erythema  Oropharynx: Normal with no erythema, edema, exudate or lesions  Pulmonary   Auscultation of lungs: Clear to auscultation  Cardiovascular   Auscultation of heart: Normal rate and rhythm, normal S1 and S2, without murmurs  Lymphatic   Palpation of lymph nodes in neck: No lymphadenopathy  Musculoskeletal   Gait and station: Normal     Inspection/palpation of joints, bones, and muscles: Abnormal   Slight degenerative changes bilateral knees  Psychiatric   Mood and affect: Abnormal   Mood and Affect: anxious  Tired appearing  Results/Data  Falls Risk Assessment (Dx Z13 89 Screen for Neurologic Disorder) 34RWB1714 02:45PM User, [employer]     Test Name Result Flag Reference   Falls Risk      No falls in the past year     PHQ-9 Adult Depression Screening 93ZHJ1598 02:45PM User, [employer]     Test Name Result Flag Reference   PHQ-9 Adult Depression Score 0     Over the last two weeks, how often have you been bothered by any of the following problems? Little interest or pleasure in doing things: Not at all - 0  Feeling down, depressed, or hopeless: Not at all - 0  Trouble falling or staying asleep, or sleeping too much: Not at all - 0  Feeling tired or having little energy: Not at all - 0  Poor appetite or over eating: Not at all - 0  Feeling bad about yourself - or that you are a failure or have let yourself or your family down: Not at all - 0  Trouble concentrating on things, such as reading the newspaper or watching television: Not at all - 0  Moving or speaking so slowly that other people could have noticed   Or the opposite -  being so fidgety or restless that you have been moving around a lot more than usual: Not at all - 0  Thoughts that you would be better off dead, or of hurting yourself in some way: Not at all - 0   PHQ-9 Adult Depression Screening Negative     PHQ-9 Difficulty Level Not difficult at all     PHQ-9 Severity No Depression       *VB-Depression Screening 17CFC7402 02:44PM Natasha Flood     Test Name Result Flag Reference   Depression Scale Result      Depression Screen - Negative For Symptoms     (1) CBC/PLT/DIFF 40WUF6813 08:33AM Kyra العلي Order Number: [id number]     Test Name Result Flag Reference   WBC COUNT 6 87 Thousand/uL  4 31-10 16   RBC COUNT 4 58 Million/uL  3 81-5 12   HEMOGLOBIN 13 4 g/dL  11 5-15 4   HEMATOCRIT 42 2 %  34 8-46  1   MCV 92 fL  82-98   MCH 29 3 pg  26 8-34 3   MCHC 31 8 g/dL  31 4-37 4   RDW 13 9 %  11 6-15 1   MPV 9 8 fL  8 9-12 7   PLATELET COUNT 890 Thousands/uL  149-390   NEUTROPHILS RELATIVE PERCENT 69 %  43-75   LYMPHOCYTES RELATIVE PERCENT 21 %  14-44   MONOCYTES RELATIVE PERCENT 7 %  4-12   EOSINOPHILS RELATIVE PERCENT 3 %  0-6   BASOPHILS RELATIVE PERCENT 0 %  0-1   NEUTROPHILS ABSOLUTE COUNT 4 76 Thousands/? ??L  1 85-7 62   LYMPHOCYTES ABSOLUTE COUNT 1 46 Thousands/? ??L  0 60-4 47   MONOCYTES ABSOLUTE COUNT 0 46 Thousand/? ??L  0 17-1 22   EOSINOPHILS ABSOLUTE COUNT 0 17 Thousand/? ??L  0 00-0 61   BASOPHILS ABSOLUTE COUNT 0 02 Thousands/? ??L  0 00-0 10     (1) COMPREHENSIVE METABOLIC PANEL 94HCZ9804 35:47LO Kyra العلي Order Number: [id number]     Test Name Result Flag Reference   GLUCOSE,RANDM 110 mg/dL     If the patient is fasting, the ADA then defines impaired fasting glucose as > 100 mg/dL and diabetes as > or equal to 123 mg/dL     SODIUM 143 mmol/L  136-145   POTASSIUM 4 6 mmol/L  3 5-5 3   CHLORIDE 105 mmol/L  100-108   CARBON DIOXIDE 30 mmol/L  21-32   ANION GAP (CALC) 8 mmol/L  4-13   BLOOD UREA NITROGEN 20 mg/dL  5-25   CREATININE 0 95 mg/dL  0 60-1 30   Standardized to IDMS reference method   CALCIUM 9 4 mg/dL  8 3-10 1   BILI, TOTAL 0 40 mg/dL  0 20-1 00   ALK PHOSPHATAS 110 U/L     ALT (SGPT) 18 U/L  12-78   AST(SGOT) 16 U/L  5-45   ALBUMIN 3 5 g/dL  3 5-5 0   TOTAL PROTEIN 6 9 g/dL  6 4-8 2   eGFR Non-African American 58 5 ml/min/1 73sq [facility] Disease Education Program recommendations are as follows:  GFR calculation is accurate only with a steady state creatinine  Chronic Kidney disease less than 60 ml/min/1 73 sq  meters  Kidney failure less than 15 ml/min/1 73 sq  meters  (1) HEMOGLOBIN A1C 38CZQ0951 08:33AM [employer] Order Number: [id number]     Test Name Result Flag Reference   HEMOGLOBIN A1C 6 4 % H 4 2-6 3   EST  AVG  GLUCOSE 137 mg/dl       (1) LIPID PANEL, FASTING 61XQX5375 08:33AM [employer] Order Number: [id number]     Test Name Result Flag Reference   CHOLESTEROL 225 mg/dL H    HDL,DIRECT 53 mg/dL  40-60   Specimen collection should occur prior to Metamizole administration due to the potential for falsely depressed results  LDL CHOLESTEROL CALCULATED 139 mg/dL H 0-100   Triglyceride:         Normal              <150 mg/dl       Borderline High    150-199 mg/dl       High               200-499 mg/dl       Very High          >499 mg/dl  Cholesterol:         Desirable        <200 mg/dl      Borderline High  200-239 mg/dl      High             >239 mg/dl  HDL Cholesterol:        High    >59 mg/dL      Low     <41 mg/dL  LDL CALCULATED:    This screening LDL is a calculated result  It does not have the accuracy of the Direct Measured LDL in the monitoring of patients with hyperlipidemia and/or statin therapy  Direct Measure LDL (OBF395) must be ordered separately in these patients     TRIGLYCERIDES 165 mg/dL H <=150   Specimen collection should occur prior to N-Acetylcysteine or Metamizole administration due to the potential for falsely depressed results  (1) TSH 61DCX8502 08:33AM [employer] Order Number: [id number]     Test Name Result Flag Reference   TSH 2 769 uIU/mL  0 358-3 740   Patients undergoing fluorescein dye angiography may retain small amounts of fluorescein in the body for 48-72 hours post procedure  Samples containing fluorescein can produce falsely depressed TSH values  If the patient had this procedure,a specimen should be resubmitted post fluorescein clearance  The recommended reference ranges for TSH during pregnancy are as follows:  First trimester 0 1 to 2 5 uIU/mL  Second trimester  0 2 to 3 0 uIU/mL  Third trimester 0 3 to 3 0 uIU/m     (1) VITAMIN D 25-HYDROXY 32LAD7680 08:33AM [employer] Order Number: [id number]     Test Name Result Flag Reference   VIT D 25-HYDROX 12 9 ng/mL L 30 0-100 0   This assay is a certified procedure of the CDC Vitamin D Standardization Certification Program (VDSCP)     Deficiency <20ng/ml   Insufficiency 20-30ng/ml   Sufficient  ng/ml     *Patients undergoing fluorescein dye angiography may retain small amounts of fluorescein in the body for 48-72 hours post procedure  Samples containing fluorescein can produce falsely elevated Vitamin D values  If the patient had this procedure, a specimen should be resubmitted post fluorescein clearance  Health Management  Encounter for screening mammogram for malignant neoplasm of breast   Digital Bilateral Screening Mammogram With CAD; every 1 year; Last 22Aug2014; Next Due:  89Npo5071; Overdue  History of Decreased body height   Dexa Scan; every 2 years; Last 95HWZ3917; Next Due: 92MHX4859; Overdue  Osteopenia   OTHER HOSPITAL STUDY - NO CHARGE; every 2 years; Next Due: 21CUG6315; Overdue    Future Appointments    Date/Time Provider Specialty Site   04/03/2017 02:00 PM MAGDALENO [place]   Obstetrics/Gynecology [facility]     Signatures   Electronically signed by MAGDALENO Veronica ; Mar 13 2017  1:00AM EST                       (Author)

## 2018-01-12 VITALS — BODY MASS INDEX: 38.62 KG/M2 | DIASTOLIC BLOOD PRESSURE: 72 MMHG | SYSTOLIC BLOOD PRESSURE: 132 MMHG | WEIGHT: 225 LBS

## 2018-01-13 VITALS
RESPIRATION RATE: 16 BRPM | BODY MASS INDEX: 38.07 KG/M2 | TEMPERATURE: 96.6 F | WEIGHT: 223 LBS | HEART RATE: 71 BPM | HEIGHT: 64 IN | DIASTOLIC BLOOD PRESSURE: 72 MMHG | SYSTOLIC BLOOD PRESSURE: 144 MMHG | OXYGEN SATURATION: 98 %

## 2018-01-13 VITALS — DIASTOLIC BLOOD PRESSURE: 80 MMHG | SYSTOLIC BLOOD PRESSURE: 142 MMHG | BODY MASS INDEX: 38.28 KG/M2 | WEIGHT: 223 LBS

## 2018-01-13 NOTE — RESULT NOTES
Verified Results  (1) VAGINITIS/ VAGINOSIS, DNA ( AFFIRM) 03Apr2017 05:23PM Nguyễn Mukesh     Test Name Result Flag Reference   CANDIDA SPECIES Negative  Negative   GARDNERELLA VAGINALIS Negative  Negative   TRICHOMONAS VAGINALIS Negative  Negative   Performed at:  [address]  [id number]  : Cyndy Franco MD, Phone:  [phone]

## 2018-01-14 VITALS
WEIGHT: 220.31 LBS | DIASTOLIC BLOOD PRESSURE: 70 MMHG | BODY MASS INDEX: 37.61 KG/M2 | SYSTOLIC BLOOD PRESSURE: 124 MMHG | HEIGHT: 64 IN

## 2018-01-14 VITALS — WEIGHT: 218 LBS | DIASTOLIC BLOOD PRESSURE: 62 MMHG | SYSTOLIC BLOOD PRESSURE: 128 MMHG | BODY MASS INDEX: 36.28 KG/M2

## 2018-01-15 VITALS — DIASTOLIC BLOOD PRESSURE: 82 MMHG | SYSTOLIC BLOOD PRESSURE: 126 MMHG | BODY MASS INDEX: 38.45 KG/M2 | WEIGHT: 224 LBS

## 2018-01-15 NOTE — RESULT NOTES
Verified Results  (1) CBC/PLT/DIFF 37CSL5836 08:33AM [employer] Order Number: [id number]     Test Name Result Flag Reference   WBC COUNT 6 87 Thousand/uL  4 31-10 16   RBC COUNT 4 58 Million/uL  3 81-5 12   HEMOGLOBIN 13 4 g/dL  11 5-15 4   HEMATOCRIT 42 2 %  34 8-46  1   MCV 92 fL  82-98   MCH 29 3 pg  26 8-34 3   MCHC 31 8 g/dL  31 4-37 4   RDW 13 9 %  11 6-15 1   MPV 9 8 fL  8 9-12 7   PLATELET COUNT 328 Thousands/uL  149-390   NEUTROPHILS RELATIVE PERCENT 69 %  43-75   LYMPHOCYTES RELATIVE PERCENT 21 %  14-44   MONOCYTES RELATIVE PERCENT 7 %  4-12   EOSINOPHILS RELATIVE PERCENT 3 %  0-6   BASOPHILS RELATIVE PERCENT 0 %  0-1   NEUTROPHILS ABSOLUTE COUNT 4 76 Thousands/? ??L  1 85-7 62   LYMPHOCYTES ABSOLUTE COUNT 1 46 Thousands/? ??L  0 60-4 47   MONOCYTES ABSOLUTE COUNT 0 46 Thousand/? ??L  0 17-1 22   EOSINOPHILS ABSOLUTE COUNT 0 17 Thousand/? ??L  0 00-0 61   BASOPHILS ABSOLUTE COUNT 0 02 Thousands/? ??L  0 00-0 10     (1) COMPREHENSIVE METABOLIC PANEL [id number] 27:83SK [employer] Order Number: [id number]     Test Name Result Flag Reference   GLUCOSE,RANDM 110 mg/dL     If the patient is fasting, the ADA then defines impaired fasting glucose as > 100 mg/dL and diabetes as > or equal to 123 mg/dL     SODIUM 143 mmol/L  136-145   POTASSIUM 4 6 mmol/L  3 5-5 3   CHLORIDE 105 mmol/L  100-108   CARBON DIOXIDE 30 mmol/L  21-32   ANION GAP (CALC) 8 mmol/L  4-13   BLOOD UREA NITROGEN 20 mg/dL  5-25   CREATININE 0 95 mg/dL  0 60-1 30   Standardized to IDMS reference method   CALCIUM 9 4 mg/dL  8 3-10 1   BILI, TOTAL 0 40 mg/dL  0 20-1 00   ALK PHOSPHATAS 110 U/L     ALT (SGPT) 18 U/L  12-78   AST(SGOT) 16 U/L  5-45   ALBUMIN 3 5 g/dL  3 5-5 0   TOTAL PROTEIN 6 9 g/dL  6 4-8 2   eGFR Non-African American 58 5 ml/min/1 73sq m     [employer] Disease Education Program recommendations are as follows:  GFR calculation is accurate only with a steady state creatinine  Chronic Kidney disease less than 60 ml/min/1 73 sq  meters  Kidney failure less than 15 ml/min/1 73 sq  meters  (1) HEMOGLOBIN A1C 33EFD5790 08:33AM [facility] Order Number: [id number]     Test Name Result Flag Reference   HEMOGLOBIN A1C 6 4 % H 4 2-6 3   EST  AVG  GLUCOSE 137 mg/dl       (1) LIPID PANEL, FASTING 60TPO5225 08:33AM [facility] Order Number: [id number]     Test Name Result Flag Reference   CHOLESTEROL 225 mg/dL H    HDL,DIRECT 53 mg/dL  40-60   Specimen collection should occur prior to Metamizole administration due to the potential for falsely depressed results  LDL CHOLESTEROL CALCULATED 139 mg/dL H 0-100   Triglyceride:         Normal              <150 mg/dl       Borderline High    150-199 mg/dl       High               200-499 mg/dl       Very High          >499 mg/dl  Cholesterol:         Desirable        <200 mg/dl      Borderline High  200-239 mg/dl      High             >239 mg/dl  HDL Cholesterol:        High    >59 mg/dL      Low     <41 mg/dL  LDL CALCULATED:    This screening LDL is a calculated result  It does not have the accuracy of the Direct Measured LDL in the monitoring of patients with hyperlipidemia and/or statin therapy  Direct Measure LDL (VTQ441) must be ordered separately in these patients  TRIGLYCERIDES 165 mg/dL H <=150   Specimen collection should occur prior to N-Acetylcysteine or Metamizole administration due to the potential for falsely depressed results  (1) TSH 59SHO8371 08:33AM [facility] Order Number: [id number]     Test Name Result Flag Reference   TSH 2 769 uIU/mL  0 358-3 740   Patients undergoing fluorescein dye angiography may retain small amounts of fluorescein in the body for 48-72 hours post procedure  Samples containing fluorescein can produce falsely depressed TSH values   If the patient had this procedure,a specimen should be resubmitted post fluorescein clearance  The recommended reference ranges for TSH during pregnancy are as follows:  First trimester 0 1 to 2 5 uIU/mL  Second trimester  0 2 to 3 0 uIU/mL  Third trimester 0 3 to 3 0 uIU/m     (1) VITAMIN D 25-HYDROXY 30KCE1311 08:33AM [employer] Order Number: [id number]     Test Name Result Flag Reference   VIT D 25-HYDROX 12 9 ng/mL L 30 0-100 0   This assay is a certified procedure of the CDC Vitamin D Standardization Certification Program (VDSCP)     Deficiency <20ng/ml   Insufficiency 20-30ng/ml   Sufficient  ng/ml     *Patients undergoing fluorescein dye angiography may retain small amounts of fluorescein in the body for 48-72 hours post procedure  Samples containing fluorescein can produce falsely elevated Vitamin D values  If the patient had this procedure, a specimen should be resubmitted post fluorescein clearance

## 2018-01-15 NOTE — RESULT NOTES
Verified Results  (1) CBC/PLT/DIFF 02Apr2016 08:45AM Gertrude Arroyo     Test Name Result Flag Reference   WBC COUNT 6 87 Thousand/uL  4 31-10 16   RBC COUNT 4 49 Million/uL  3 81-5 12   HEMOGLOBIN 13 0 g/dL  11 5-15 4   HEMATOCRIT 40 3 %  34 8-46  1   MCV 90 fL  82-98   MCH 29 0 pg  26 8-34 3   MCHC 32 3 g/dL  31 4-37 4   RDW 14 0 %  11 6-15 1   MPV 9 9 fL  8 9-12 7   PLATELET COUNT 627 Thousands/uL  149-390   NEUTROPHILS RELATIVE PERCENT 69 %  43-75   LYMPHOCYTES RELATIVE PERCENT 22 %  14-44   MONOCYTES RELATIVE PERCENT 7 %  4-12   EOSINOPHILS RELATIVE PERCENT 2 %  0-6   BASOPHILS RELATIVE PERCENT 0 %  0-1   NEUTROPHILS ABSOLUTE COUNT 4 70 Thousands/µL  1 85-7 62   LYMPHOCYTES ABSOLUTE COUNT 1 51 Thousands/µL  0 60-4 47   MONOCYTES ABSOLUTE COUNT 0 47 Thousand/µL  0 17-1 22   EOSINOPHILS ABSOLUTE COUNT 0 16 Thousand/µL  0 00-0 61   BASOPHILS ABSOLUTE COUNT 0 03 Thousands/µL  0 00-0 10     (1) COMPREHENSIVE METABOLIC PANEL [id number] 29:41CI Gertrude Arroyo   National Kidney Disease Education Program recommendations are as follows:  GFR calculation is accurate only with a steady state creatinine  Chronic Kidney disease less than 60 ml/min/1 73 sq  meters  Kidney failure less than 15 ml/min/1 73 sq  meters  Test Name Result Flag Reference   GLUCOSE,RANDM 108 mg/dL     If the patient is fasting, the ADA then defines impaired fasting glucose as > 100 mg/dL and diabetes as > or equal to 123 mg/dL     SODIUM 142 mmol/L  136-145   POTASSIUM 4 1 mmol/L  3 5-5 3   CHLORIDE 105 mmol/L  100-108   CARBON DIOXIDE 27 mmol/L  21-32   ANION GAP (CALC) 10 mmol/L  4-13   BLOOD UREA NITROGEN 19 mg/dL  5-25   CREATININE 0 92 mg/dL  0 60-1 30   Standardized to IDMS reference method   CALCIUM 9 0 mg/dL  8 3-10 1   BILI, TOTAL 0 34 mg/dL  0 20-1 00   ALK PHOSPHATAS 109 U/L     ALT (SGPT) 22 U/L  12-78   AST(SGOT) 14 U/L  5-45   ALBUMIN 3 4 g/dL L 3 5-5 0   TOTAL PROTEIN 6 9 g/dL  6 4-8 2   eGFR Non- >60 0 ml/min/1 73sq m     (1) LIPID PANEL, FASTING 02Apr2016 08:45AM Edelmira Noe   Triglyceride:         Normal              <150 mg/dl       Borderline High    150-199 mg/dl       High               200-499 mg/dl       Very High          >499 mg/dl  Cholesterol:         Desirable        <200 mg/dl      Borderline High  200-239 mg/dl      High             >239 mg/dl  HDL Cholesterol:        High    >59 mg/dL      Low     <41 mg/dL     Test Name Result Flag Reference   CHOLESTEROL 204 mg/dL H    HDL,DIRECT 48 mg/dL  40-60   Specimen collection should occur prior to Metamizole administration due to the potential for falsely depressed results  LDL CHOLESTEROL CALCULATED 122 mg/dL H 0-100   TRIGLYCERIDES 169 mg/dL H <=150   Specimen collection should occur prior to N-Acetylcysteine or Metamizole administration due to the potential for falsely depressed results  (1) TSH 02Apr2016 08:45AM Edelmira Noe   Patients undergoing fluorescein dye angiography may retain small amounts of fluorescein in the body for 48-72 hours post procedure  Samples containing fluorescein can produce falsely depressed TSH values  If the patient had this procedure,a specimen should be resubmitted post fluorescein clearance          The recommended reference ranges for TSH during pregnancy are as follows:  First trimester 0 1 to 2 5 uIU/mL  Second trimester  0 2 to 3 0 uIU/mL  Third trimester 0 3 to 3 0 uIU/m     Test Name Result Flag Reference   TSH 2 430 uIU/mL  0 358-3 740     (1) VITAMIN D 25-HYDROXY 02Apr2016 08:45AM [facility]     Test Name Result Flag Reference   VIT D 25-HYDROX 27 3 ng/mL L 30 0-100 0     (1) HEMOGLOBIN A1C 02Apr2016 08:45AM Edelmira Noe   5 7-6 4% impaired fasting glucose  >=6 5% diagnosis of diabetes    Falsely low levels are seen in conditions linked to short RBC life span-  hemolytic anemia, and splenomegaly  Falsely elevated levels are seen in situations where there is an increased production of RBC- receipt of erythropoietin or blood transfusions  Adopted from ADA-Clinical Practice Recommendations     Test Name Result Flag Reference   HEMOGLOBIN A1C 6 2 % H 4 0-5 6   EST  AVG   GLUCOSE 131 mg/dl

## 2018-01-16 NOTE — RESULT NOTES
Verified Results  * DXA BONE DENSITY SPINE HIP AND PELVIS [id number] 02:05PM Charu Carpio Order Number: [id number]    - Patient Instructions: To schedule this appointment, please contact Central Scheduling at [phone]  Test Name Result Flag Reference   DXA BONE DENSITY SPINE HIP AND PELVIS (Report)     CENTRAL DXA SCAN     CLINICAL HISTORY:  69year old post-menopausal  female risk factors include estrogen deficient     TECHNIQUE: Bone densitometry was performed using a H[employer] Discovery A bone densitometer  Regions of interest appear properly placed  There are no obvious fractures or other confounding variables which could limit the study  COMPARISON: None  RESULTS:    LUMBAR SPINE: L1-L4:   BMD 1 069 gm/cm2   T-score 0 2   Z-score 2 3     LEFT TOTAL HIP:   BMD 0 991 gm/cm2   T-score 0 4   Z-score 1 9     LEFT FEMORAL NECK:   BMD 0 688 gm/cm2   T-score -1 5   Z-score 0 3             IMPRESSION:   1  Based on the [facility] classification, the T-score of -1 5 in the left femoral neck is consistent with low bone mineral density  2  Any secondary causes of low bone mineral density should be excluded prior to treatment, if clinically indicated  3  A daily intake of at least 1200 mg calcium and 800 to 1000 IU of Vitamin D, as well as weight bearing and muscle strengthening exercise, fall prevention and avoidance of tobacco and excessive alcohol intake as basic preventive measures are suggested  4  Repeat DXA in 18 - 24 months, on the same machine, as clinically indicated  The 10 year risk of hip fracture is 1 0%, with the 10 year risk of major osteoporotic fracture being 8 6%, as calculated by the [facility] fracture risk assessment tool (FRAX)  The current NOF guidelines recommend treating patients with FRAX 10 year risk score   of >3% for hip fracture and >20% for major osteoporotic fracture        WHO CLASSIFICATION:   Normal (a T-score of -1 0 or higher)   Low bone mineral density (a T-score of less than -1 0 but higher than -2 5)   Osteoporosis (a T-score of -2 5 or less)   Severe osteoporosis (a T-score of -2 5 or less with a fragility fracture)             Workstation performed: [id number]     Signed by:   Alexandra Carrasco MD   8/9/17       Plan  PMH: Decreased body height    · Dexa Scan ; every 2 years; Last [id number];  Next [id number]; Status:Active

## 2018-01-17 NOTE — RESULT NOTES
Verified Results  * US PELVIS COMPLETE (TRANSABDOMINAL AND TRANSVAGINAL) 19Apr2017 12:18PM Charu Carpio Order Number: [id number]    - Patient Instructions: To schedule this appointment, please contact Central Scheduling at [phone]  Test Name Result Flag Reference   US PELVIS COMPLETE (TRANSABDOMINAL AND TRANSVAGINAL) (Report)     PELVIC ULTRASOUND, COMPLETE     INDICATION: Pelvic and perineal pain  Patient saw light brown discharge  COMPARISON: None     TECHNIQUE:  Transabdominal pelvic ultrasound was performed in sagittal and transverse planes with a curvilinear transducer  Additional transvaginal imaging was performed to better evaluate the endometrium and ovaries  Imaging included volumetric    sweeps as well as traditional still imaging technique  FINDINGS:     UTERUS:   The uterus is anteverted in position, measuring 9 1 x 4 3 x 6 4 cm  Mildly heterogeneous myometrium  Questionable 9 mm submucosal fibroid anterior body  The cervix shows no suspicious abnormality  ENDOMETRIUM:    Mildly thickened measuring 6 mm  No focal mass detected  OVARIES/ADNEXA:   Neither ovary visualized  No suspicious adnexal mass or loculated collections  There is no free fluid  IMPRESSION:      Mildly thickened endometrium measuring 6 mm  Follow-up should be based on clinical grounds  Heterogeneous uterus with questionable 9 mm submucosal fibroid anterior body  Nonvisualization of the ovaries  No suspicious adnexal masses         ##sigslh##sigslh       Workstation performed: [id number]     Signed by:   Kirk Desai DO   4/21/17

## 2018-01-17 NOTE — RESULT NOTES
Verified Results  * XR KNEE 4+ VIEW RIGHT [id number] 06:13AM [address] Court Order Number: [id number]     Test Name Result Flag Reference   XR KNEE 4+ VW RIGHT (Report)     RIGHT KNEE     INDICATION: Right knee pain  COMPARISON: None     VIEWS: AP, lateral and obliques ; 4 images     FINDINGS:     There is no acute fracture or dislocation  Small joint effusion  Medial tibiofemoral compartment; minimal periarticular osteophytes, moderate joint space narrowing, no subchondral sclerosis, no subchondral cysts  Lateral tibiofemoral compartment; minimal periarticular osteophytes, moderate joint space narrowing, no subchondral sclerosis, no subchondral cysts  Patellofemoral compartment; moderate periarticular osteophytes, moderate to moderately severe joint space narrowing, no subchondral sclerosis, a few subchondral cysts  There is no joint subluxation  No lytic or blastic lesions are seen  Soft tissues are unremarkable  IMPRESSION:     Tricompartmental degenerative changes, most prominent in the patellofemoral joint  Joint effusion  Workstation performed: [id number]     Signed by:   Juan Portillo MD   6/14/16

## 2018-01-17 NOTE — RESULT NOTES
Verified Results  MAMMO SCREENING BILATERAL W 3D & CAD [id number] 01:32PM Elroy 9     Test Name Result Flag Reference   MAMMO SCREENING BILATERAL W 3D & CAD (Report)     Patient History:   Patient is postmenopausal    No known family history of cancer  Patient has never smoked  Patient's BMI is 38 4  Reason for exam: screening, asymptomatic  Mammo Screening Bilateral W DBT and CAD: 2017 -    Check In #: [de-identified]   2D/3D Procedure   3D views: Bilateral MLO view(s) were taken  2D views: Bilateral CC view(s) were taken  Technologist: ATILIO Mac (ATILIO)(M)   Prior study comparison: 2014, bilateral digital    screening mammogram performed at  [facility]  May 9, 2011, bilateral digital screening mammogram    performed at  [facility]  There are scattered fibroglandular densities  No dominant soft tissue mass, architectural distortion or    suspicious calcifications are noted in either breast  The skin    and nipple contours are within normal limits  No significant changes when compared with prior studies  ACR BI-RADSï¾® Assessments: BiRad:1 - Negative     Recommendation:   Routine screening mammogram of both breasts in 1 year  A    reminder letter will be scheduled  8-10% of cancers will be missed on mammography  Management of a    palpable abnormality must be based on clinical grounds  Patients    will be notified of their results via letter from our facility  Accredited by [employer] of [employer] and FDA       Transcription Location: ATILIO [address]: [id number]     Risk Value(s):   Tyrer-Cuzick 10 Year: 2 600%, Tyrer-Cuzick Lifetime: 4 800%,    Myriad Table: 1 5%, TRUDI 5 Year: 1 4%, NCI Lifetime: 4 6%

## 2018-01-17 NOTE — RESULT NOTES
Verified Results  (1) TISSUE EXAM [id number] 12:00AM Nguyễn Mayorga     Test Name Result Flag Reference   LAB AP CASE REPORT (Report)     Surgical Pathology Report             Case: [id number]                   Authorizing Provider: Latrice Calhoun MD     Collected:      06/01/2017           Pathologist:      Maynor Vizcaino MD   Received:      06/02/2017 1216        Specimen:  Endometrium, Endometrium   LAB AP FINAL DIAGNOSIS      A  Endometrium, biopsy:  - Complex endometrial hyperplasia without atypia  - Portions of necroinflammatory debris are present of undetermined   significance and origin (see note)  Electronically signed by Maynor Vizcaino MD on 6/8/2017 at 3:59 PM   LAB AP NOTE (Report)     The sample shows a focus of endometrial tissue with glandular crowding and   architectural complexity consistent with complex hyperplasia without   significant cytologic atypia  Immunohistochemical staining for p53 shows   a patchy wild-type staining pattern supporting the diagnosis  Additional   strips of benign appearing surface endometrium and fragments of cervical   glandular and squamous mucosa are present  Portions of necrotic debris are present with associated scattered acute   inflammatory cells  Gram stain highlights scattered gram-positive rods and   GMS stain is negative for fungal elements  The finding of necrosis is of   undetermined origin and may indicate a more significant endometrial   pathology (if endometrial in origin) and clinical correlation is   recommended to evaluate adequacy of sampling  LAB AP SURGICAL ADDITIONAL INFORMATION (Report)     These tests were developed and their performance characteristics   determined by [facility] or [facility]  They may not be cleared or approved by the [place]  Food and   Drug Administration  The FDA has determined that such clearance or   approval is not necessary  These tests are used for clinical purposes  They should not be regarded as investigational or for research  This   laboratory has been approved by CLIA 88, designated as a high-complexity   laboratory and is qualified to perform these tests  Interpretation performed at [facility]   LAB AP GROSS DESCRIPTION (Report)     A  The specimen is received in formalin, labeled with the patient's name   and hospital number, and is designated endometrium  The specimen   consists of multiple tan to red mucinous, hemorrhagic, and soft tissue   fragments measuring in loose aggregate 2 9 x 1 2 x 0 2 cm  Entire   submitted  One cassette    Formalin time not given      AEK

## 2018-03-19 PROBLEM — I35.0 AORTIC STENOSIS, MILD: Status: ACTIVE | Noted: 2018-03-19

## 2018-03-26 ENCOUNTER — TELEPHONE (OUTPATIENT)
Dept: INTERNAL MEDICINE CLINIC | Facility: CLINIC | Age: 68
End: 2018-03-26

## 2018-03-26 NOTE — TELEPHONE ENCOUNTER
Pt called to schedule an appt  She is scheduled for the beginning of May and was asking for labs  Stated she did not having any done in quite some time

## 2018-04-08 DIAGNOSIS — E55.9 VITAMIN D DEFICIENCY: ICD-10-CM

## 2018-04-08 DIAGNOSIS — M85.80 OSTEOPENIA, UNSPECIFIED LOCATION: ICD-10-CM

## 2018-04-08 DIAGNOSIS — I10 ACCELERATED ESSENTIAL HYPERTENSION: ICD-10-CM

## 2018-04-08 DIAGNOSIS — E11.9 TYPE 2 DIABETES MELLITUS WITHOUT COMPLICATION, WITHOUT LONG-TERM CURRENT USE OF INSULIN (HCC): Primary | ICD-10-CM

## 2018-04-08 DIAGNOSIS — R53.83 FATIGUE, UNSPECIFIED TYPE: ICD-10-CM

## 2018-04-08 DIAGNOSIS — I35.0 AORTIC STENOSIS, MILD: ICD-10-CM

## 2018-04-08 DIAGNOSIS — E66.01 CLASS 2 SEVERE OBESITY DUE TO EXCESS CALORIES WITH SERIOUS COMORBIDITY AND BODY MASS INDEX (BMI) OF 36.0 TO 36.9 IN ADULT (HCC): ICD-10-CM

## 2018-04-08 PROBLEM — N85.01 COMPLEX ENDOMETRIAL HYPERPLASIA: Status: ACTIVE | Noted: 2017-06-23

## 2018-04-08 PROBLEM — R93.89 THICKENED ENDOMETRIUM: Status: ACTIVE | Noted: 2017-06-01

## 2018-04-08 PROBLEM — G47.00 INSOMNIA: Status: ACTIVE | Noted: 2017-03-07

## 2018-04-08 PROBLEM — R10.2 FEMALE PELVIC PAIN: Status: ACTIVE | Noted: 2017-04-03

## 2018-05-03 ENCOUNTER — OFFICE VISIT (OUTPATIENT)
Dept: INTERNAL MEDICINE CLINIC | Facility: CLINIC | Age: 68
End: 2018-05-03
Payer: COMMERCIAL

## 2018-05-03 VITALS
SYSTOLIC BLOOD PRESSURE: 148 MMHG | WEIGHT: 214.5 LBS | BODY MASS INDEX: 35.74 KG/M2 | DIASTOLIC BLOOD PRESSURE: 72 MMHG | TEMPERATURE: 97.7 F | HEART RATE: 87 BPM | OXYGEN SATURATION: 97 % | HEIGHT: 65 IN

## 2018-05-03 DIAGNOSIS — I35.0 AORTIC STENOSIS, MILD: ICD-10-CM

## 2018-05-03 DIAGNOSIS — I10 ACCELERATED ESSENTIAL HYPERTENSION: Primary | ICD-10-CM

## 2018-05-03 DIAGNOSIS — E78.5 DYSLIPIDEMIA: ICD-10-CM

## 2018-05-03 DIAGNOSIS — E11.9 TYPE 2 DIABETES MELLITUS WITHOUT COMPLICATION, WITHOUT LONG-TERM CURRENT USE OF INSULIN (HCC): ICD-10-CM

## 2018-05-03 PROCEDURE — 1101F PT FALLS ASSESS-DOCD LE1/YR: CPT | Performed by: FAMILY MEDICINE

## 2018-05-03 PROCEDURE — 99214 OFFICE O/P EST MOD 30 MIN: CPT | Performed by: FAMILY MEDICINE

## 2018-05-03 RX ORDER — METRONIDAZOLE 7.5 MG/G
GEL TOPICAL
COMMUNITY
Start: 2018-04-27

## 2018-05-03 NOTE — PROGRESS NOTES
Assessment/Plan:    No problem-specific Assessment & Plan notes found for this encounter  Diagnoses and all orders for this visit:    Accelerated essential hypertension  -     CBC and differential; Future  -     Comprehensive metabolic panel; Future    Aortic stenosis, mild    Type 2 diabetes mellitus without complication, without long-term current use of insulin (HCC)  -     Comprehensive metabolic panel; Future  -     HEMOGLOBIN A1C W/ EAG ESTIMATION; Future    Dyslipidemia  -     Comprehensive metabolic panel; Future  -     Lipid panel; Future  -     TSH, 3rd generation; Future        Blood pressure controlled relatively well  Slightly elevated but may be related to recent stressors  Watch salt intake  Increase activity level  She is due for laboratory testing  She is actually overdue  Discussed with her the need to check her blood sugar and her hemoglobin A1c  Watch carbohydrate intake  Cholesterol has been elevated higher than goal in the past   Watch diet  Increase activity level if possible  Weight loss would be optimal   Discussed goal of an LDL less than 100  Since she has had bowel changes, recommended she follow up with GI or colorectal regarding her symptoms  She has followed up with Cardiology in the past but not recently  Discussed with her control of risk factors  She is up-to-date on her pneumonia vaccines  She is up-to-date on her flu shot  Discussed methods for stress relief  Continue follow-up with Ophthalmology regularly  Continue follow-up with Podiatry  He has followed up with gyn  Has her slip for her mammogram   Will have her follow up in about 3-4 months or sooner if needed based on her laboratory testing  Subjective:      Patient ID: Stacy Horvath is a 77 y o  female  She presents for routine follow-up  Has not been seen in quite a while  Has not had the any recent laboratory testing done  Continues to work full-time  Has many stressors at work  Has some stressors at home as well  Because of financial issues she plans on working for as long as possible  Has some stressors with her daughter having some medical issues as well  Has been tolerating her valsartan/hydrochlorothiazide without difficulty  Denies any headaches or localized weakness  Denies any chest pain or palpitations  Has not followed up with Cardiology recently  Denies any significant shortness of breath  Has followed up with gyn  Denies any urinary issues  Appetite has been generally stable  Denies any nausea, vomiting, or diarrhea  Bowel movements have changed somewhat  Has become more constipated  Bowel movements are last regular  Has difficulty with sleeping  She works night shift and often does not get at consistent amount of prolonged sleep  Vision has been stable  Continues to follow-up with Ophthalmology on a regular basis  Does follow-up with Dr Dallas Chiang for her feet  Has slip for her mammogram but has not yet gone  Denies any polyuria, polydipsia, or polyphagia  Does not check her blood sugars on a regular basis  The following portions of the patient's history were reviewed and updated as appropriate:   She  has a past medical history of Arthritis; Carpal tunnel syndrome; Cataract; Decreased body height; Heart murmur; Hypercholesteremia; Hypertension; Motion sickness; Obesity; Periodic heart flutter; Pneumonia; PONV (postoperative nausea and vomiting); Rosacea; Vertigo; Wears glasses; and Wears partial dentures    She   Patient Active Problem List    Diagnosis Date Noted    Aortic stenosis, mild 03/19/2018    Complex endometrial hyperplasia 06/23/2017    Thickened endometrium 06/01/2017    Female pelvic pain 04/03/2017    Insomnia 91/25/5933    Systolic ejection murmur 01/12/0498    Primary osteoarthritis of right knee 05/31/2016    Carpal tunnel syndrome on left 04/08/2016    Tendonitis of left hip 06/11/2015    Vitamin D deficiency 06/11/2015    Type 2 diabetes mellitus without complication, without long-term current use of insulin ([facility] [address] ) 2015    Dyslipidemia 2014    Snoring 2014    Osteopenia 2014    Accelerated essential hypertension 2013    Class 2 obesity due to excess calories with body mass index (BMI) of 36 0 to 36 9 in adult 2013    Fatigue 2012    Asymptomatic varicose veins 2012     She  has a past surgical history that includes Colonoscopy;  section; pr revise median n/carpal tunnel surg (Left, 2016); Carpal tunnel release (Bilateral); and pr laparoscopy w tot hysterectuterus <=250 gram  w tube/ovary (N/A, 2017)  Her family history includes Arthritis in her mother; Diabetes in her father; Hyperlipidemia in her sister; Hypertension in her mother; Stroke in her mother; Sudden death in her brother and father  She  reports that she has never smoked  She has never used smokeless tobacco  She reports that she does not drink alcohol or use drugs  Current Outpatient Prescriptions   Medication Sig Dispense Refill    Coenzyme Q10 (COQ-10) 100 MG CAPS Take by mouth daily      diclofenac sodium (VOLTAREN) 1 % Place on the skin      ergocalciferol (VITAMIN D2) 50,000 units Take 1 capsule by mouth once a week      hydrochlorothiazide (HYDRODIURIL) 25 mg tablet Take 25 mg by mouth daily      ibuprofen (MOTRIN) 600 mg tablet Take 1 tablet by mouth every 6 (six) hours as needed for mild pain 30 tablet 0    metroNIDAZOLE (METROGEL) 0 75 % gel       Multiple Vitamin (DAILY VALUE MULTIVITAMIN) TABS Take 1 tablet by mouth daily      valsartan-hydrochlorothiazide (DIOVAN-HCT) 320-12 5 MG per tablet Take 1 tablet by mouth daily  No current facility-administered medications for this visit        Current Outpatient Prescriptions on File Prior to Visit   Medication Sig    Coenzyme Q10 (COQ-10) 100 MG CAPS Take by mouth daily    diclofenac sodium (VOLTAREN) 1 % Place on the skin    ergocalciferol (VITAMIN D2) 50,000 units Take 1 capsule by mouth once a week    hydrochlorothiazide (HYDRODIURIL) 25 mg tablet Take 25 mg by mouth daily    ibuprofen (MOTRIN) 600 mg tablet Take 1 tablet by mouth every 6 (six) hours as needed for mild pain    Multiple Vitamin (DAILY VALUE MULTIVITAMIN) TABS Take 1 tablet by mouth daily    valsartan-hydrochlorothiazide (DIOVAN-HCT) 320-12 5 MG per tablet Take 1 tablet by mouth daily  No current facility-administered medications on file prior to visit  She is allergic to penicillins       Review of Systems   Constitutional: Positive for fatigue  Negative for activity change, appetite change, chills and fever  HENT: Negative for congestion and rhinorrhea  Eyes: Negative for visual disturbance  Respiratory: Negative for cough, chest tightness and shortness of breath  Cardiovascular: Negative for chest pain and palpitations  Gastrointestinal: Positive for constipation  Negative for abdominal pain, blood in stool, diarrhea, nausea and vomiting  See HPI   Endocrine: Negative for polydipsia, polyphagia and polyuria  Genitourinary: Negative for dysuria, frequency and urgency  Musculoskeletal: Positive for arthralgias  Negative for gait problem  Skin: Negative for color change  Neurological: Negative for dizziness and headaches  Hematological: Does not bruise/bleed easily  Psychiatric/Behavioral: Positive for sleep disturbance  Negative for confusion  The patient is not nervous/anxious  Objective:      /72 (BP Location: Left arm, Patient Position: Sitting, Cuff Size: Adult)   Pulse 87   Temp 97 7 °F (36 5 °C) (Temporal)   Ht 5' 5" (1 651 m)   Wt 97 3 kg (214 lb 8 oz)   SpO2 97%   BMI 35 69 kg/m²          Physical Exam   Constitutional: She is oriented to person, place, and time  She is cooperative  No distress  HENT:   Head: Normocephalic and atraumatic     Mouth/Throat: Oropharynx is clear and moist    Eyes: Conjunctivae are normal  Pupils are equal, round, and reactive to light  Right eye exhibits no discharge  Left eye exhibits no discharge  Cardiovascular: Normal rate, regular rhythm and normal heart sounds  Exam reveals no gallop and no friction rub  No murmur heard  Pulmonary/Chest: No respiratory distress  She has no wheezes  She has no rales  Abdominal: Bowel sounds are normal  She exhibits no distension  There is no tenderness  Musculoskeletal: She exhibits no edema  Degenerative changes bilateral knees   Lymphadenopathy:     She has no cervical adenopathy  Neurological: She is alert and oriented to person, place, and time  Skin: Skin is warm and dry  Psychiatric: She has a normal mood and affect  Her behavior is normal    Nursing note and vitals reviewed

## 2018-05-11 ENCOUNTER — OFFICE VISIT (OUTPATIENT)
Dept: CARDIOLOGY CLINIC | Facility: HOSPITAL | Age: 68
End: 2018-05-11
Payer: COMMERCIAL

## 2018-05-11 VITALS
HEIGHT: 65 IN | SYSTOLIC BLOOD PRESSURE: 124 MMHG | HEART RATE: 78 BPM | DIASTOLIC BLOOD PRESSURE: 68 MMHG | WEIGHT: 213.4 LBS | BODY MASS INDEX: 35.56 KG/M2

## 2018-05-11 DIAGNOSIS — I10 ACCELERATED ESSENTIAL HYPERTENSION: Primary | ICD-10-CM

## 2018-05-11 DIAGNOSIS — E66.01 CLASS 2 SEVERE OBESITY DUE TO EXCESS CALORIES WITH SERIOUS COMORBIDITY AND BODY MASS INDEX (BMI) OF 36.0 TO 36.9 IN ADULT (HCC): ICD-10-CM

## 2018-05-11 DIAGNOSIS — I35.0 AORTIC STENOSIS, MILD: ICD-10-CM

## 2018-05-11 DIAGNOSIS — E78.5 DYSLIPIDEMIA: ICD-10-CM

## 2018-05-11 PROCEDURE — 99214 OFFICE O/P EST MOD 30 MIN: CPT | Performed by: INTERNAL MEDICINE

## 2018-05-11 PROCEDURE — 93000 ELECTROCARDIOGRAM COMPLETE: CPT | Performed by: INTERNAL MEDICINE

## 2018-05-11 NOTE — PROGRESS NOTES
Cardiology Follow Up    Fernando Crigler  1950  [id number]  [address] [place] [address] [facility] [place] [address] [address]    1  Accelerated essential hypertension  POCT ECG   2  Dyslipidemia     3  Aortic stenosis, mild     4  Class 2 severe obesity due to excess calories with serious comorbidity and body mass index (BMI) of 36 0 to 36 9 in adult [facility])         Discussion/Summary:   Mrs Anamaria Epps is a pleasant 70-year-old female who presents to the office for routine follow-up  Since her last visit she has been feeling well  She offers no cardiac complaints today      Her blood pressure control appears well-controlled on her current regimen  No changes were made      Her most recent lipids were reviewed  She no longer is having myalgias with the atorvastatin along with coenzyme Q10  She is due to have her lipids reassessed in the near future and I will await those results        Due to his systolic ejection murmur noted on exam she underwent an echo after last visit which revealed mild aortic stenosis  No testing is advised      I'll see her back in the office in one year or sooner if deemed necessary  Interval History:   Mrs Anamaria Epps is a pleasant 70-year-old female who presents to the office today for routine follow-up  Since her last visit she has been feeling well  She leads a sedentary lifestyle but with the activity she is able to do she denies any chest pain or shortness of breath  She denies symptoms of heart failure  She denies lightheadedness, syncope or presyncope  She denies symptoms of claudication      She is now tolerating atorvastatin 20 mg four times per week      Problem List     Carpal tunnel syndrome on left    Accelerated essential hypertension    Dyslipidemia    Class 2 obesity due to excess calories with body mass index (BMI) of 36 0 to 36 9 in adult    Aortic stenosis, mild    Complex endometrial hyperplasia    Fatigue    Female pelvic pain    Type 2 diabetes mellitus without complication, without long-term current use of insulin (HCC)    Insomnia    Osteopenia    Primary osteoarthritis of right knee    Snoring    Systolic ejection murmur    Tendonitis of left hip    Thickened endometrium    Asymptomatic varicose veins    Vitamin D deficiency        Past Medical History:   Diagnosis Date    Arthritis     Carpal tunnel syndrome     unspecified laterality    Cataract     Decreased body height     last assessed 3/17/14    Heart murmur     Hypercholesteremia     Hypertension     Motion sickness     Obesity     Periodic heart flutter     Pneumonia     x1    PONV (postoperative nausea and vomiting)     Rosacea     Vertigo     Wears glasses     Wears partial dentures     upper     Social History     Social History    Marital status: /Civil Union     Spouse name: N/A    Number of children: N/A    Years of education: N/A     Occupational History    Not on file       Social History Main Topics    Smoking status: Never Smoker    Smokeless tobacco: Never Used    Alcohol use No    Drug use: No    Sexual activity: Not on file     Other Topics Concern    Not on file     Social History Narrative    Stress at work          Family History   Problem Relation Age of Onset    Arthritis Mother     Hypertension Mother     Stroke Mother      syndrome    Diabetes Father     Sudden death Father      instantaneous cardiac    Hyperlipidemia Sister     Sudden death Brother      instananeous cardiac     Past Surgical History:   Procedure Laterality Date    CARPAL TUNNEL RELEASE Bilateral      SECTION      x1    COLONOSCOPY      [place] LAPAROSCOPY W TOT HYSTERECTUTERUS <=250 GRAM  W TUBE/OVARY N/A 2017    Procedure: HYSTERECTOMY LAPAROSCOPIC TOTAL, BSO, Cystoscopy;  Surgeon: Katrin Caruso MD;  [facility];  Service: Gynecology    [place] REVISE MEDIAN N/CARPAL TUNNEL SURG Left 4/8/2016    Procedure: RELEASE CARPAL TUNNEL;  Surgeon: Chandra Dietrich MD;  [facility];  Service: Orthopedics       Current Outpatient Prescriptions:     Coenzyme Q10 (COQ-10) 100 MG CAPS, Take by mouth daily, Disp: , Rfl:     diclofenac sodium (VOLTAREN) 1 %, Place on the skin, Disp: , Rfl:     ergocalciferol (VITAMIN D2) 50,000 units, Take 1 capsule by mouth once a week, Disp: , Rfl:     hydrochlorothiazide (HYDRODIURIL) 25 mg tablet, Take 25 mg by mouth daily, Disp: , Rfl:     ibuprofen (MOTRIN) 600 mg tablet, Take 1 tablet by mouth every 6 (six) hours as needed for mild pain, Disp: 30 tablet, Rfl: 0    metroNIDAZOLE (METROGEL) 0 75 % gel, , Disp: , Rfl:     Multiple Vitamin (DAILY VALUE MULTIVITAMIN) TABS, Take 1 tablet by mouth daily, Disp: , Rfl:     valsartan-hydrochlorothiazide (DIOVAN-HCT) 320-12 5 MG per tablet, Take 1 tablet by mouth daily  , Disp: , Rfl:   Allergies   Allergen Reactions    Penicillins Hives and Rash       Labs:     Chemistry        Component Value Date/Time     08/29/2017 0900     06/08/2015 0728    K 4 0 08/29/2017 0900    K 3 8 06/08/2015 0728     08/29/2017 0900     06/08/2015 0728    CO2 31 08/29/2017 0900    CO2 28 7 06/08/2015 0728    BUN 11 08/29/2017 0900    BUN 23 06/08/2015 0728    CREATININE 1 18 08/29/2017 0900    CREATININE 0 96 06/08/2015 0728        Component Value Date/Time    CALCIUM 8 6 08/29/2017 0900    CALCIUM 9 2 06/08/2015 0728    ALKPHOS 110 02/18/2017 0833    ALKPHOS 117 (H) 06/08/2015 0728    AST 16 02/18/2017 0833    AST 14 06/08/2015 0728    ALT 18 02/18/2017 0833    ALT 22 06/08/2015 0728    BILITOT 0 40 02/18/2017 0833    BILITOT 0 3 06/08/2015 0728            Lab Results   Component Value Date    CHOL 199 08/14/2017    CHOL 225 (H) 02/18/2017    CHOL 204 (H) 04/02/2016     Lab Results   Component Value Date    HDL 52 08/14/2017    HDL 53 02/18/2017    HDL 48 04/02/2016     Lab Results   Component Value Date    LDLCALC 126 (H) 08/14/2017    LDLCALC 139 (H) 02/18/2017    LDLCALC 122 (H) 04/02/2016     Lab Results   Component Value Date    TRIG 107 08/14/2017    TRIG 165 (H) 02/18/2017    TRIG 169 (H) 04/02/2016     No components found for: CHOLHDL    Imaging: No results found  ECG:  Sinus rhythm with occasional premature ventricular complexes, rightward axis, incomplete right bundle branch block      Review of Systems   Cardiovascular: Negative for chest pain, claudication, cyanosis, dyspnea on exertion, irregular heartbeat, leg swelling, near-syncope, orthopnea, palpitations, paroxysmal nocturnal dyspnea and syncope  All other systems reviewed and are negative  Vitals:    05/11/18 0856   BP: 124/68   Pulse: 78     Vitals:    05/11/18 0856   Weight: 96 8 kg (213 lb 6 4 oz)     Height: 5' 5" (165 1 cm)   Body mass index is 35 51 kg/m²      Physical Exam:   General appearance:  Appears stated age, alert, well appearing and in no distress  HEENT:  PERRLA, EOMI, no scleral icterus, no conjunctival pallor  NECK:  Supple, No elevated JVP, no thyromegaly, no carotid bruits  HEART:  Regular rate and rhythm, normal S1/S2, 2/6 early peaking systolic ejection murmur at the right upper sternal border without radiation  LUNGS:  Clear to auscultation bilaterally  ABDOMEN:  Soft, non-tender, positive bowel sounds, no rebound or guarding, no organomegaly   EXTREMITIES:  No edema, varicosities noted bilaterally  VASCULAR:  Normal pedal pulses   SKIN: No lesions or rashes on exposed skin  NEURO:  CN II-XII intact, no focal deficits

## 2018-07-04 ENCOUNTER — APPOINTMENT (EMERGENCY)
Dept: CT IMAGING | Facility: HOSPITAL | Age: 68
End: 2018-07-04
Payer: COMMERCIAL

## 2018-07-04 ENCOUNTER — HOSPITAL ENCOUNTER (EMERGENCY)
Facility: HOSPITAL | Age: 68
End: 2018-07-04
Attending: EMERGENCY MEDICINE
Payer: COMMERCIAL

## 2018-07-04 ENCOUNTER — APPOINTMENT (EMERGENCY)
Dept: RADIOLOGY | Facility: HOSPITAL | Age: 68
End: 2018-07-04
Payer: COMMERCIAL

## 2018-07-04 ENCOUNTER — APPOINTMENT (EMERGENCY)
Dept: RADIOLOGY | Facility: HOSPITAL | Age: 68
DRG: 200 | End: 2018-07-04
Payer: COMMERCIAL

## 2018-07-04 ENCOUNTER — HOSPITAL ENCOUNTER (INPATIENT)
Facility: HOSPITAL | Age: 68
LOS: 1 days | Discharge: HOME | DRG: 200 | End: 2018-07-06
Attending: SURGERY | Admitting: SURGERY
Payer: COMMERCIAL

## 2018-07-04 ENCOUNTER — APPOINTMENT (OUTPATIENT)
Dept: RADIOLOGY | Facility: HOSPITAL | Age: 68
DRG: 200 | End: 2018-07-04
Payer: COMMERCIAL

## 2018-07-04 VITALS
TEMPERATURE: 98.7 F | BODY MASS INDEX: 34.71 KG/M2 | SYSTOLIC BLOOD PRESSURE: 163 MMHG | HEART RATE: 69 BPM | DIASTOLIC BLOOD PRESSURE: 88 MMHG | OXYGEN SATURATION: 99 % | WEIGHT: 208.56 LBS | RESPIRATION RATE: 18 BRPM

## 2018-07-04 DIAGNOSIS — R07.81 RIB PAIN ON LEFT SIDE: ICD-10-CM

## 2018-07-04 DIAGNOSIS — S22.42XA CLOSED FRACTURE OF MULTIPLE RIBS OF LEFT SIDE, INITIAL ENCOUNTER: Primary | ICD-10-CM
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
## 2018-07-04 DIAGNOSIS — I67.1 CEREBRAL ANEURYSM: ICD-10-CM

## 2018-07-04 DIAGNOSIS — S22.42XA CLOSED FRACTURE OF MULTIPLE RIBS OF LEFT SIDE, INITIAL ENCOUNTER: Primary | Chronic | ICD-10-CM

## 2018-07-04 DIAGNOSIS — R91.1 LUNG NODULE: ICD-10-CM

## 2018-07-04 PROBLEM — J93.9 PNEUMOTHORAX, LEFT: Status: ACTIVE | Noted: 2018-07-04

## 2018-07-04 LAB
ABO GROUP BLD: NORMAL
ALBUMIN SERPL BCP-MCNC: 3.4 G/DL (ref 3.5–5)
ALP SERPL-CCNC: 117 U/L (ref 46–116)
ALT SERPL W P-5'-P-CCNC: 22 U/L (ref 12–78)
ANION GAP SERPL CALCULATED.3IONS-SCNC: 11 MMOL/L (ref 4–13)
APTT PPP: 31 SECONDS (ref 24–36)
AST SERPL W P-5'-P-CCNC: 18 U/L (ref 5–45)
BASOPHILS # BLD MANUAL: 0 THOUSAND/UL (ref 0–0.1)
BASOPHILS NFR MAR MANUAL: 0 % (ref 0–1)
BILIRUB SERPL-MCNC: 0.2 MG/DL (ref 0.2–1)
BLD GP AB SCN SERPL QL: NEGATIVE
BUN SERPL-MCNC: 18 MG/DL (ref 5–25)
CALCIUM SERPL-MCNC: 9.5 MG/DL (ref 8.3–10.1)
CHLORIDE SERPL-SCNC: 107 MMOL/L (ref 100–108)
CO2 SERPL-SCNC: 26 MMOL/L (ref 21–32)
CREAT SERPL-MCNC: 0.98 MG/DL (ref 0.6–1.3)
EOSINOPHIL # BLD MANUAL: 0.09 THOUSAND/UL (ref 0–0.4)
EOSINOPHIL NFR BLD MANUAL: 1 % (ref 0–6)
ERYTHROCYTE [DISTWIDTH] IN BLOOD BY AUTOMATED COUNT: 13.5 % (ref 11.6–15.1)
GFR SERPL CREATININE-BSD FRML MDRD: 59 ML/MIN/1.73SQ M
GLUCOSE SERPL-MCNC: 114 MG/DL (ref 65–140)
HCT VFR BLD AUTO: 39.2 % (ref 34.8–46.1)
HGB BLD-MCNC: 13 G/DL (ref 11.5–15.4)
INR PPP: 1.06 (ref 0.86–1.17)
LACTATE SERPL-SCNC: 1.3 MMOL/L (ref 0.5–2)
LYMPHOCYTES # BLD AUTO: 1.53 THOUSAND/UL (ref 0.6–4.47)
LYMPHOCYTES # BLD AUTO: 17 % (ref 14–44)
MAGNESIUM SERPL-MCNC: 2 MG/DL (ref 1.6–2.6)
MCH RBC QN AUTO: 30.1 PG (ref 26.8–34.3)
MCHC RBC AUTO-ENTMCNC: 33.2 G/DL (ref 31.4–37.4)
MCV RBC AUTO: 91 FL (ref 82–98)
MONOCYTES # BLD AUTO: 0.18 THOUSAND/UL (ref 0–1.22)
MONOCYTES NFR BLD: 2 % (ref 4–12)
NEUTROPHILS # BLD MANUAL: 7.21 THOUSAND/UL (ref 1.85–7.62)
NEUTS SEG NFR BLD AUTO: 80 % (ref 43–75)
PHOSPHATE SERPL-MCNC: 2.9 MG/DL (ref 2.3–4.1)
PLATELET # BLD AUTO: 291 THOUSANDS/UL (ref 149–390)
PLATELET BLD QL SMEAR: ADEQUATE
PMV BLD AUTO: 9.5 FL (ref 8.9–12.7)
POTASSIUM SERPL-SCNC: 3.8 MMOL/L (ref 3.5–5.3)
PROT SERPL-MCNC: 7.7 G/DL (ref 6.4–8.2)
PROTHROMBIN TIME: 13.3 SECONDS (ref 11.8–14.2)
RBC # BLD AUTO: 4.32 MILLION/UL (ref 3.81–5.12)
RH BLD: POSITIVE
SODIUM SERPL-SCNC: 144 MMOL/L (ref 136–145)
SPECIMEN EXPIRATION DATE: NORMAL
TOTAL CELLS COUNTED SPEC: 100
TROPONIN I SERPL-MCNC: <0.02 NG/ML
WBC # BLD AUTO: 9.01 THOUSAND/UL (ref 4.31–10.16)

## 2018-07-04 PROCEDURE — 96374 THER/PROPH/DIAG INJ IV PUSH: CPT

## 2018-07-04 PROCEDURE — 71260 CT THORAX DX C+: CPT

## 2018-07-04 PROCEDURE — 83735 ASSAY OF MAGNESIUM: CPT | Performed by: EMERGENCY MEDICINE

## 2018-07-04 PROCEDURE — 99220 PR INITIAL OBSERVATION CARE/DAY 70 MINUTES: CPT | Performed by: SURGERY

## 2018-07-04 PROCEDURE — 96361 HYDRATE IV INFUSION ADD-ON: CPT

## 2018-07-04 PROCEDURE — 86901 BLOOD TYPING SEROLOGIC RH(D): CPT | Performed by: EMERGENCY MEDICINE

## 2018-07-04 PROCEDURE — 85007 BL SMEAR W/DIFF WBC COUNT: CPT | Performed by: EMERGENCY MEDICINE

## 2018-07-04 PROCEDURE — 93005 ELECTROCARDIOGRAM TRACING: CPT

## 2018-07-04 PROCEDURE — 85027 COMPLETE CBC AUTOMATED: CPT | Performed by: EMERGENCY MEDICINE

## 2018-07-04 PROCEDURE — 84100 ASSAY OF PHOSPHORUS: CPT | Performed by: EMERGENCY MEDICINE

## 2018-07-04 PROCEDURE — 96375 TX/PRO/DX INJ NEW DRUG ADDON: CPT

## 2018-07-04 PROCEDURE — 86850 RBC ANTIBODY SCREEN: CPT | Performed by: EMERGENCY MEDICINE

## 2018-07-04 PROCEDURE — 85730 THROMBOPLASTIN TIME PARTIAL: CPT | Performed by: EMERGENCY MEDICINE

## 2018-07-04 PROCEDURE — 36415 COLL VENOUS BLD VENIPUNCTURE: CPT | Performed by: EMERGENCY MEDICINE

## 2018-07-04 PROCEDURE — 86900 BLOOD TYPING SEROLOGIC ABO: CPT | Performed by: EMERGENCY MEDICINE

## 2018-07-04 PROCEDURE — 99285 EMERGENCY DEPT VISIT HI MDM: CPT

## 2018-07-04 PROCEDURE — 71045 X-RAY EXAM CHEST 1 VIEW: CPT

## 2018-07-04 PROCEDURE — 74177 CT ABD & PELVIS W/CONTRAST: CPT

## 2018-07-04 PROCEDURE — 72125 CT NECK SPINE W/O DYE: CPT

## 2018-07-04 PROCEDURE — 84484 ASSAY OF TROPONIN QUANT: CPT | Performed by: EMERGENCY MEDICINE

## 2018-07-04 PROCEDURE — 70450 CT HEAD/BRAIN W/O DYE: CPT

## 2018-07-04 PROCEDURE — 80053 COMPREHEN METABOLIC PANEL: CPT | Performed by: EMERGENCY MEDICINE

## 2018-07-04 PROCEDURE — 85610 PROTHROMBIN TIME: CPT | Performed by: EMERGENCY MEDICINE

## 2018-07-04 PROCEDURE — 83605 ASSAY OF LACTIC ACID: CPT | Performed by: EMERGENCY MEDICINE

## 2018-07-04 RX ORDER — METHOCARBAMOL 500 MG/1
500 TABLET, FILM COATED ORAL EVERY 6 HOURS PRN
Status: DISCONTINUED | OUTPATIENT
Start: 2018-07-04 | End: 2018-07-06 | Stop reason: HOSPADM

## 2018-07-04 RX ORDER — OXYCODONE HYDROCHLORIDE 5 MG/1
5 TABLET ORAL EVERY 4 HOURS PRN
Status: DISCONTINUED | OUTPATIENT
Start: 2018-07-04 | End: 2018-07-06 | Stop reason: HOSPADM

## 2018-07-04 RX ORDER — ACETAMINOPHEN 325 MG/1
650 TABLET ORAL EVERY 6 HOURS SCHEDULED
Status: DISCONTINUED | OUTPATIENT
Start: 2018-07-04 | End: 2018-07-06 | Stop reason: HOSPADM

## 2018-07-04 RX ORDER — ONDANSETRON 2 MG/ML
4 INJECTION INTRAMUSCULAR; INTRAVENOUS ONCE
Status: COMPLETED | OUTPATIENT
Start: 2018-07-04 | End: 2018-07-04

## 2018-07-04 RX ORDER — HYDROCHLOROTHIAZIDE 25 MG/1
25 TABLET ORAL DAILY
Status: DISCONTINUED | OUTPATIENT
Start: 2018-07-05 | End: 2018-07-06 | Stop reason: HOSPADM

## 2018-07-04 RX ORDER — ONDANSETRON 2 MG/ML
4 INJECTION INTRAMUSCULAR; INTRAVENOUS EVERY 6 HOURS PRN
Status: DISCONTINUED | OUTPATIENT
Start: 2018-07-04 | End: 2018-07-06 | Stop reason: HOSPADM

## 2018-07-04 RX ORDER — OXYCODONE HYDROCHLORIDE 5 MG/1
2.5 TABLET ORAL EVERY 4 HOURS PRN
Status: DISCONTINUED | OUTPATIENT
Start: 2018-07-04 | End: 2018-07-06 | Stop reason: HOSPADM

## 2018-07-04 RX ORDER — MORPHINE SULFATE 4 MG/ML
4 INJECTION, SOLUTION INTRAMUSCULAR; INTRAVENOUS ONCE
Status: COMPLETED | OUTPATIENT
Start: 2018-07-04 | End: 2018-07-04

## 2018-07-04 RX ORDER — LIDOCAINE 50 MG/G
1 PATCH TOPICAL DAILY
Status: DISCONTINUED | OUTPATIENT
Start: 2018-07-05 | End: 2018-07-05

## 2018-07-04 RX ADMIN — SODIUM CHLORIDE 1000 ML: 0.9 INJECTION, SOLUTION INTRAVENOUS at 12:55

## 2018-07-04 RX ADMIN — ACETAMINOPHEN 650 MG: 325 TABLET ORAL at 23:01

## 2018-07-04 RX ADMIN — MORPHINE SULFATE 4 MG: 4 INJECTION, SOLUTION INTRAMUSCULAR; INTRAVENOUS at 13:02

## 2018-07-04 RX ADMIN — IOHEXOL 100 ML: 350 INJECTION, SOLUTION INTRAVENOUS at 13:18

## 2018-07-04 RX ADMIN — ONDANSETRON 4 MG: 2 INJECTION INTRAMUSCULAR; INTRAVENOUS at 12:58

## 2018-07-04 RX ADMIN — ENOXAPARIN SODIUM 30 MG: 30 INJECTION SUBCUTANEOUS at 22:59

## 2018-07-04 NOTE — H&P
H&P Exam - Trauma   Abelardo Thornton 77 y o  female MRN: [id number]  Unit/Bed#: ED 14 Encounter: [id number]    Assessment/Plan   Trauma Alert: Evaluation  Model of Arrival: Transfer [facility]   Trauma Team: Attending [facility] and Residents [address] [place] [address]  Consultants: None    Trauma Active Problems:     1  Acute fractures of the left lateral 7-10 ribs   - Admit to obs   - Pain control  - Pulm toilet/ IS    - add CT Head, CT C-spine     Chief Complaint: rib pain     History of Present Illness   HPI:  Abelardo Thornton is a 77 y o  female who presents with left rib pain s/p mechanical fall at home  Patient states that she was standing on a 3-4 foot step stool when she lost her balance and fell  She states that she landing on her left side onto a wrought iron table  She denies hitting her head or LOC  C/o pain to the left ribs  She denies any neck pain, headache, numbness/tingling/weakness of her extremities, vision changes, chest pain, abdominal pain, current shortness of breath, or nausea/vomiting  Patient was seen at [facility] where she had a CT abdomen pelvis with recons of the thoracolumbar spine  She was diagnosed with acute fractures of the left lateral 7th through 10th ribs  No pneumothorax was seen  Mechanism:Fall    Review of Systems   Constitutional: Negative for chills and fever  HENT: Negative for congestion, ear pain and sore throat  Eyes: Negative for pain and visual disturbance  Respiratory: Negative for cough, shortness of breath and wheezing  Cardiovascular: Positive for chest pain  Negative for leg swelling  Gastrointestinal: Negative for abdominal pain, diarrhea, nausea and vomiting  Genitourinary: Negative for dysuria, frequency, hematuria and urgency  Musculoskeletal: Negative for neck pain and neck stiffness  Skin: Negative for rash and wound  Neurological: Negative for weakness, numbness and headaches     Psychiatric/Behavioral: Negative for agitation and confusion  All other systems reviewed and are negative            Past Medical History:   Diagnosis Date    Arthritis     Carpal tunnel syndrome     unspecified laterality    Cataract     Decreased body height     last assessed 3/17/14    Heart murmur     Hypercholesteremia     Hypertension     Motion sickness     Obesity     Periodic heart flutter     Pneumonia     x1    PONV (postoperative nausea and vomiting)     Rosacea     Vertigo     Wears glasses     Wears partial dentures     upper     Past Surgical History:   Procedure Laterality Date    CARPAL TUNNEL RELEASE Bilateral      SECTION      x1    COLONOSCOPY      [place] LAPAROSCOPY W TOT HYSTERECTUTERUS <=250 GRAM  W TUBE/OVARY N/A 2017    Procedure: HYSTERECTOMY LAPAROSCOPIC TOTAL, BSO, Cystoscopy;  Surgeon: Eugenio Irene MD;  [facility];  Service: Gynecology    [place] REVISE MEDIAN N/CARPAL TUNNEL SURG Left 2016    Procedure: RELEASE CARPAL TUNNEL;  Surgeon: Case Shi MD;  [facility];  Service: Orthopedics     Social History   History   Alcohol Use No     History   Drug Use No     History   Smoking Status    Never Smoker   Smokeless Tobacco    Never Used     Immunization History   Administered Date(s) Administered    Influenza Split High Dose Preservative Free IM 10/12/2016    Influenza TIV (IM) 2011, 2013    Pneumococcal Conjugate 13-Valent 2016    Pneumococcal Polysaccharide PPV23 10/19/2007     Last Tetanus:  Up-to-date  Family History: Non-contributory      Meds/Allergies   all current active meds have been reviewed, current meds:   Current Facility-Administered Medications   Medication Dose Route Frequency    acetaminophen (TYLENOL) tablet 650 mg  650 mg Oral Q6H [facility]    enoxaparin (LOVENOX) subcutaneous injection 30 mg  30 mg Subcutaneous Q12H [facility]    HYDROmorphone (DILAUDID) injection 0 2 mg  0 2 mg Intravenous Q3H PRN    [START ON 2018] lidocaine (LIDODERM) 5 % patch 1 patch  1 patch Transdermal Daily    methocarbamol (ROBAXIN) tablet 500 mg  500 mg Oral Q6H PRN    ondansetron (ZOFRAN) injection 4 mg  4 mg Intravenous Q6H PRN    oxyCODONE (ROXICODONE) IR tablet 2 5 mg  2 5 mg Oral Q4H PRN    oxyCODONE (ROXICODONE) IR tablet 5 mg  5 mg Oral Q4H PRN    and PTA meds:   Prior to Admission Medications   Prescriptions Last Dose Informant Patient Reported? Taking? Coenzyme Q10 (COQ-10) 100 MG CAPS 7/3/2018 at Unknown time  Yes Yes   Sig: Take by mouth daily   Multiple Vitamin (DAILY VALUE MULTIVITAMIN) TABS 7/3/2018 at Unknown time  Yes Yes   Sig: Take 1 tablet by mouth daily   diclofenac sodium (VOLTAREN) 1 % 7/3/2018 at Unknown time  Yes Yes   Sig: Place on the skin   ergocalciferol (VITAMIN D2) 50,000 units 7/3/2018 at Unknown time  Yes Yes   Sig: Take 1 capsule by mouth once a week   hydrochlorothiazide (HYDRODIURIL) 25 mg tablet 7/3/2018 at Unknown time  Yes Yes   Sig: Take 25 mg by mouth daily   ibuprofen (MOTRIN) 600 mg tablet Unknown at Unknown time  No No   Sig: Take 1 tablet by mouth every 6 (six) hours as needed for mild pain   metroNIDAZOLE (METROGEL) 0 75 % gel 7/3/2018 at Unknown time  Yes Yes   valsartan-hydrochlorothiazide (DIOVAN-HCT) 320-12 5 MG per tablet 7/3/2018 at Unknown time  Yes Yes   Sig: Take 1 tablet by mouth daily  Facility-Administered Medications: None       Allergies   Allergen Reactions    Penicillins Hives and Rash         PHYSICAL EXAM    Objective   Vitals:   First set: Temperature: 98 8 °F (37 1 °C) (07/04/18 1630)  Pulse: 73 (07/04/18 1630)  Respirations: 21 (07/04/18 1630)  Blood Pressure: 166/76 (07/04/18 1630)    Primary Survey:   (A) Airway:  Intact  (B) Breathing:  Equal breath sounds bilaterally  (C) Circulation: Pulses:   normal  (D) Disabliity:  GCS Total:  15  (E) Expose:  Completed    Secondary Survey: (Click on Physical Exam tab above)  Physical Exam   Constitutional: She is oriented to person, place, and time   She appears well-developed and well-nourished  No distress  HENT:   Head: Normocephalic and atraumatic  Right Ear: No hemotympanum  Left Ear: No hemotympanum  Mouth/Throat: Oropharynx is clear and moist    Eyes: Conjunctivae and EOM are normal  Pupils are equal, round, and reactive to light  Neck: Normal range of motion  Neck supple  Cardiovascular: Normal rate and regular rhythm  Pulmonary/Chest: Effort normal and breath sounds normal  No respiratory distress  She has no wheezes  She has no rales  She exhibits tenderness  Chest wall tenderness to the left lateral mid to lower ribs  No bony step-offs or crepitus noted   Abdominal: Soft  Bowel sounds are normal  She exhibits no distension  There is no tenderness  Musculoskeletal: Normal range of motion  Lymphadenopathy:     She has no cervical adenopathy  Neurological: She is alert and oriented to person, place, and time  GCS eye subscore is 4  GCS verbal subscore is 5  GCS motor subscore is 6  Strength 5/5 throughout all extremities  No focal deficits  Skin: Skin is warm and dry  She is not diaphoretic  Nursing note and vitals reviewed        Invasive Devices     Peripheral Intravenous Line            Peripheral IV 07/04/18 Right Antecubital less than 1 day                Lab Results:   Results: I have personally reviewed pertinent reports   , BMP/CMP:   Lab Results   Component Value Date     07/04/2018    K 3 8 07/04/2018     07/04/2018    CO2 26 07/04/2018    ANIONGAP 11 07/04/2018    BUN 18 07/04/2018    CREATININE 0 98 07/04/2018    GLUCOSE 114 07/04/2018    CALCIUM 9 5 07/04/2018    AST 18 07/04/2018    ALT 22 07/04/2018    ALKPHOS 117 (H) 07/04/2018    PROT 7 7 07/04/2018    BILITOT 0 20 07/04/2018    EGFR 59 07/04/2018   , CBC:   Lab Results   Component Value Date    WBC 9 01 07/04/2018    HGB 13 0 07/04/2018    HCT 39 2 07/04/2018    MCV 91 07/04/2018     07/04/2018    MCH 30 1 07/04/2018    MCHC 33 2 07/04/2018 RDW 13 5 07/04/2018    MPV 9 5 07/04/2018   , Coagulation:   Lab Results   Component Value Date    INR 1 06 07/04/2018   , Lactate: No results found for: LACTATE, Amylase: No results found for: AMYLASE, AST:   Lab Results   Component Value Date    AST 18 07/04/2018    and ALT:   Lab Results   Component Value Date    ALT 22 07/04/2018     Imaging/EKG Studies: Results: I have personally reviewed pertinent reports  and I have personally reviewed pertinent films in PACS    CT C/Abd/Pel: 1  Acute fractures of the left lateral 7th, 8th, 9th and 10th ribs  2   No findings for acute intrathoracic, abdominal or pelvic injury  3  A few pulmonary nodules measuring up to 6 mm in size in the left lower lobe  Based on current Fleischner Society 2017 Guidelines on incidental pulmonary nodule, followup non-contrast CT is recommended at 3-6 months from the initial examination and, if stable at that time, an additional followup is recommended for 18-24 months from the initial examination  No acute osseous abnormality  Minimal anterolisthesis noted of L3 on L4, may be degenerative  Multilevel degenerative changes        Code Status: Level 2 - DNAR: but accepts endotracheal intubation  Advance Directive and Living Will:      Power of :    POLST:

## 2018-07-04 NOTE — EMTALA/ACUTE CARE TRANSFER
[address]  [address] [facility] [address]  Dept: [phone]      EMTALA TRANSFER CONSENT    NAME Madison Hansen                                         1950                              MRN [id number]    I have been informed of my rights regarding examination, treatment, and transfer   by Dr Darryl Eduardo DO    Benefits: Specialized equipment and/or services available at the receiving facility (Include comment)________________________, Continuity of care    Risks: Potential for delay in receiving treatment, Potential deterioration of medical condition, Loss of IV, Increased discomfort during transfer, Possible worsening of condition or death during transfer      Consent for Transfer:  I acknowledge that my medical condition has been evaluated and explained to me by the emergency department physician or other qualified medical person and/or my attending physician, who has recommended that I be transferred to the service of    at    The above potential benefits of such transfer, the potential risks associated with such transfer, and the probable risks of not being transferred have been explained to me, and I fully understand them  The doctor has explained that, in my case, the benefits of transfer outweigh the risks  I agree to be transferred  I authorize the performance of emergency medical procedures and treatments upon me in both transit and upon arrival at the receiving facility  Additionally, I authorize the release of any and all medical records to the receiving facility and request they be transported with me, if possible  I understand that the safest mode of transportation during a medical emergency is an ambulance and that the Hospital advocates the use of this mode of transport   Risks of traveling to the receiving facility by car, including absence of medical control, life sustaining equipment, such as oxygen, and medical personnel has been explained to me and I fully understand them  (DARCY CORRECT BOX BELOW)  [  ]  I consent to the stated transfer and to be transported by ambulance/helicopter  [  ]  I consent to the stated transfer, but refuse transportation by ambulance and accept full responsibility for my transportation by car  I understand the risks of non-ambulance transfers and I exonerate the Hospital and its staff from any deterioration in my condition that results from this refusal     X___________________________________________    DATE  18  TIME________  Signature of patient or legally responsible individual signing on patient behalf           RELATIONSHIP TO PATIENT_________________________          Provider [address] [place] [address] 1950                              MRN [id number]    A medical screening exam was performed on the above named patient  Based on the examination:    Condition Necessitating Transfer The primary encounter diagnosis was Closed fracture of multiple ribs of left side, initial encounter  Diagnoses of Fall, initial encounter, Rib pain on left side, and Lung nodule were also pertinent to this visit      Patient Condition: The patient has been stabilized such that within reasonable medical probability, no material deterioration of the patient condition or the condition of the unborn child(mau) is likely to result from the transfer    Reason for Transfer: Level of Care needed not available at this facility, No bed available at level of patient's needs    Transfer Requirements: Facility     · Space available and qualified personnel available for treatment as acknowledged by    · Agreed to accept transfer and to provide appropriate medical treatment as acknowledged by          · Appropriate medical records of the examination and treatment of the patient are provided at the time of transfer   [address] _______  · Transfer will be performed by qualified personnel from    and appropriate transfer equipment as required, including the use of necessary and appropriate life support measures  Provider Certification: I have examined the patient and explained the following risks and benefits of being transferred/refusing transfer to the patient/family:         Based on these reasonable risks and benefits to the patient and/or the unborn child(mau), and based upon the information available at the time of the patients examination, I certify that the medical benefits reasonably to be expected from the provision of appropriate medical treatments at another medical facility outweigh the increasing risks, if any, to the individuals medical condition, and in the case of labor to the unborn child, from effecting the transfer      X____________________________________________ DATE 07/04/18        TIME_______      ORIGINAL - SEND TO MEDICAL RECORDS   COPY - SEND WITH PATIENT DURING TRANSFER

## 2018-07-04 NOTE — ED PROVIDER NOTES
History  Chief Complaint   Patient presents with    Rib Injury     On a step stool, lost balance and fell onto a neftali iron railing on left ribs     Patient is a 20-year-old female with a history of hypertension, hyperlipidemia coming in after a fall at home  She is not on any anticoagulation and family at bedside to help with story  Patient states she was standing on a 3-4 foot step stool when she lost her balance and fell  She states she landed on her left side onto a wrought iron table  She did not hit her head or lose consciousness  Patient states it "knocked the wind out of me in the pain was so bad on the left side in no happened"  Patient has pain to the left side only  She has no headache, blurry vision, double vision, neck pain, difficulty swallowing  She has no difficulty moving her bilateral upper extremities or lower extremities  She denies any paresthesias throughout the extremities  She has no abdominal pain  She has no nauseousness or vomiting  Patient came in by private vehicle and was will to the room  Patient had focal T getting from her the wheelchair to the bed as there is significant amount of pain her left side  History provided by:  Patient, caregiver and relative   used: No    Fall   Mechanism of injury: fall    Injury location:  Torso  Torso injury location:  L flank  Incident location:  Home  Arrived directly from scene: yes    Fall:     Fall occurred: from step stool  Height of fall:  3-4 feet     Impact surface: landed on wrought iron table  Point of impact: left lateral trunk      Entrapped after fall: no    Suspicion of alcohol use: no    Suspicion of drug use: no    Tetanus status:  Up to date  Associated symptoms: chest pain and difficulty breathing    Associated symptoms: no abdominal pain, no back pain, no blindness, no headaches, no hearing loss, no loss of consciousness, no nausea, no neck pain, no seizures and no vomiting    Risk factors: no AICD, no anticoagulation therapy, no asthma, no beta blocker therapy, no CABG, no CAD, no CHF, no COPD, no diabetes, no dialysis, no hemophilia, no kidney disease, no pacemaker, no past MI, not pregnant and no steroid use        Prior to Admission Medications   Prescriptions Last Dose Informant Patient Reported? Taking? Coenzyme Q10 (COQ-10) 100 MG CAPS   Yes No   Sig: Take by mouth daily   Multiple Vitamin (DAILY VALUE MULTIVITAMIN) TABS   Yes No   Sig: Take 1 tablet by mouth daily   diclofenac sodium (VOLTAREN) 1 %   Yes No   Sig: Place on the skin   ergocalciferol (VITAMIN D2) 50,000 units   Yes No   Sig: Take 1 capsule by mouth once a week   hydrochlorothiazide (HYDRODIURIL) 25 mg tablet   Yes No   Sig: Take 25 mg by mouth daily   ibuprofen (MOTRIN) 600 mg tablet   No No   Sig: Take 1 tablet by mouth every 6 (six) hours as needed for mild pain   metroNIDAZOLE (METROGEL) 0 75 % gel   Yes No   valsartan-hydrochlorothiazide (DIOVAN-HCT) 320-12 5 MG per tablet   Yes No   Sig: Take 1 tablet by mouth daily        Facility-Administered Medications: None       Past Medical History:   Diagnosis Date    Arthritis     Carpal tunnel syndrome     unspecified laterality    Cataract     Decreased body height     last assessed 3/17/14    Heart murmur     Hypercholesteremia     Hypertension     Motion sickness     Obesity     Periodic heart flutter     Pneumonia     x1    PONV (postoperative nausea and vomiting)     Rosacea     Vertigo     Wears glasses     Wears partial dentures     upper       Past Surgical History:   Procedure Laterality Date    CARPAL TUNNEL RELEASE Bilateral      SECTION      x1    COLONOSCOPY      [place] LAPAROSCOPY W TOT HYSTERECTUTERUS <=250 GRAM  W TUBE/OVARY N/A 2017    Procedure: HYSTERECTOMY LAPAROSCOPIC TOTAL, BSO, Cystoscopy;  Surgeon: Giles Gibson MD;  Location: [facility];  Service: Gynecology    [place] REVISE MEDIAN N/CARPAL TUNNEL SURG Left 2016 Procedure: RELEASE CARPAL TUNNEL;  Surgeon: Wilner Díaz MD;  Location: [facility] OR;  Service: Orthopedics       Family History   Problem Relation Age of Onset    Arthritis Mother     Hypertension Mother     Stroke Mother         syndrome    Diabetes Father     Sudden death Father         instantaneous cardiac    Hyperlipidemia Sister     Sudden death Brother         instananeous cardiac     I have reviewed and agree with the history as documented  Social History   Substance Use Topics    Smoking status: Never Smoker    Smokeless tobacco: Never Used    Alcohol use No        Review of Systems   Constitutional: Negative for diaphoresis and fever  HENT: Negative for ear pain, hearing loss and sore throat  Eyes: Negative for blindness and visual disturbance  Respiratory: Negative for chest tightness and shortness of breath  Cardiovascular: Positive for chest pain  Negative for palpitations  Gastrointestinal: Negative for abdominal pain, nausea and vomiting  Genitourinary: Negative for difficulty urinating and dysuria  Musculoskeletal: Negative for back pain and neck pain  Skin: Negative for rash  Neurological: Negative for seizures, loss of consciousness, weakness and headaches  Psychiatric/Behavioral: Negative for confusion  All other systems reviewed and are negative  Physical Exam  Physical Exam   Constitutional: She is oriented to person, place, and time  She appears well-developed and well-nourished  No distress  HENT:   Head: Normocephalic and atraumatic  Right Ear: Hearing, tympanic membrane, external ear and ear canal normal  No mastoid tenderness  Left Ear: Hearing, tympanic membrane, external ear and ear canal normal  No mastoid tenderness  Nose: Nose normal    Mouth/Throat: Uvula is midline, oropharynx is clear and moist and mucous membranes are normal    Eyes: Conjunctivae and EOM are normal  Pupils are equal, round, and reactive to light     Neck: Trachea normal, normal range of motion and phonation normal  Neck supple  No neck rigidity  No edema and no erythema present  No tenderness along C1-C7 without any step-offs, pain upon palpation  No evidence of external trauma  Cardiovascular: Normal rate, regular rhythm, normal heart sounds and intact distal pulses  No murmur heard  Pulmonary/Chest: Effort normal  No stridor  No respiratory distress  She has decreased breath sounds in the right lower field and the left lower field  Abdominal: Soft  Bowel sounds are normal  She exhibits no distension  There is no tenderness  Musculoskeletal: Normal range of motion  She exhibits no edema  Thoracic back: Normal         Lumbar back: Normal         Back:    Patient has full active range of motion of the bilateral upper extremities including the shoulder, elbow, wrist and hand as well as bilateral lower extremities including the hips, knees and ankles  Manual muscle grade 4 +out of 5 throughout the bilateral upper lower extremities  Pelvis is stable  Neurological: She is alert and oriented to person, place, and time  No cranial nerve deficit  Skin: Skin is warm  Capillary refill takes less than 2 seconds  She is not diaphoretic  Nursing note and vitals reviewed        Vital Signs  ED Triage Vitals   Temperature Pulse Respirations Blood Pressure SpO2   07/04/18 1244 07/04/18 1244 07/04/18 1244 07/04/18 1244 07/04/18 1244   98 7 °F (37 1 °C) 88 18 168/79 96 %      Temp Source Heart Rate Source Patient Position - Orthostatic VS BP Location FiO2 (%)   07/04/18 1244 07/04/18 1244 07/04/18 1320 07/04/18 1244 --   Temporal Monitor Lying Left arm       Pain Score       07/04/18 1244       7           Vitals:    07/04/18 1320 07/04/18 1325 07/04/18 1328 07/04/18 1400   BP: 166/67 157/69 144/66 157/89   Pulse: 72 74 74 70   Patient Position - Orthostatic VS: Lying Lying Lying        Visual Acuity  Visual Acuity      Most Recent Value   L Pupil Size (mm)  3 R Pupil Size (mm)  3          ED Medications  Medications   sodium chloride 0 9 % bolus 1,000 mL (1,000 mL Intravenous New Bag 7/4/18 1255)   morphine (PF) 4 mg/mL injection 4 mg (4 mg Intravenous Given 7/4/18 1302)   ondansetron (ZOFRAN) injection 4 mg (4 mg Intravenous Given 7/4/18 1258)   iohexol (OMNIPAQUE) 350 MG/ML injection (SINGLE-DOSE) 100 mL (100 mL Intravenous Given 7/4/18 1318)       Diagnostic Studies  Results Reviewed     Procedure Component Value Units Date/Time    CBC and differential [[id number]]  (Normal) Collected:  07/04/18 1254    Lab Status:  Final result Specimen:  Blood from Arm, Right Updated:  07/04/18 1334     WBC 9 01 Thousand/uL      RBC 4 32 Million/uL      Hemoglobin 13 0 g/dL      Hematocrit 39 2 %      MCV 91 fL      MCH 30 1 pg      MCHC 33 2 g/dL      RDW 13 5 %      MPV 9 5 fL      Platelets 923 Thousands/uL     Narrative: This is an appended report  These results have been appended to a previously verified report  Troponin I [[id number]]  (Normal) Collected:  07/04/18 1254    Lab Status:  Final result Specimen:  Blood from Arm, Right Updated:  07/04/18 1325     Troponin I <0 02 ng/mL     Lactic acid, plasma [[id number]]  (Normal) Collected:  07/04/18 1254    Lab Status:  Final result Specimen:  Blood from Arm, Right Updated:  07/04/18 1324     LACTIC ACID 1 3 mmol/L     Narrative:         Result may be elevated if tourniquet was used during collection      Comprehensive metabolic panel [[id number]]  (Abnormal) Collected:  07/04/18 1254    Lab Status:  Final result Specimen:  Blood from Arm, Right Updated:  07/04/18 1321     Sodium 144 mmol/L      Potassium 3 8 mmol/L      Chloride 107 mmol/L      CO2 26 mmol/L      Anion Gap 11 mmol/L      BUN 18 mg/dL      Creatinine 0 98 mg/dL      Glucose 114 mg/dL      Calcium 9 5 mg/dL      AST 18 U/L      ALT 22 U/L      Alkaline Phosphatase 117 (H) U/L      Total Protein 7 7 g/dL      Albumin 3 4 (L) g/dL      Total Bilirubin 0 20 mg/dL eGFR 59 ml/min/1 73sq m     Narrative:         National Kidney Disease Education Program recommendations are as follows:  GFR calculation is accurate only with a steady state creatinine  Chronic Kidney disease less than 60 ml/min/1 73 sq  meters  Kidney failure less than 15 ml/min/1 73 sq  meters  Phosphorus [[id number]]  (Normal) Collected:  07/04/18 1254    Lab Status:  Final result Specimen:  Blood from Arm, Right Updated:  07/04/18 1321     Phosphorus 2 9 mg/dL     Magnesium [[id number]]  (Normal) Collected:  07/04/18 1254    Lab Status:  Final result Specimen:  Blood from Arm, Right Updated:  07/04/18 1321     Magnesium 2 0 mg/dL     Protime-INR [[id number]]  (Normal) Collected:  07/04/18 1254    Lab Status:  Final result Specimen:  Blood from Arm, Right Updated:  07/04/18 1315     Protime 13 3 seconds      INR 1 06    APTT [[id number]]  (Normal) Collected:  07/04/18 1254    Lab Status:  Final result Specimen:  Blood from Arm, Right Updated:  07/04/18 1315     PTT 31 seconds     UA w Reflex to Microscopic [[id number]]     Lab Status:  No result Specimen:  Urine                  CT recon only thoracolumbar   Final Result by Kenneth Escobar MD (07/04 6029)      1  No acute osseous abnormality  2   Minimal anterolisthesis noted of L3 on L4, may be degenerative  Multilevel degenerative changes  Workstation performed: [id number]         CT chest abdomen pelvis w contrast   Final Result by Kenneth Escobar MD (07/04 7790)      1  Acute fractures of the left lateral 7th, 8th, 9th and 10th ribs  2   No findings for acute intrathoracic, abdominal or pelvic injury  3  A few pulmonary nodules measuring up to 6 mm in size in the left lower lobe   Based on current Fleischner Society 2017 Guidelines on incidental pulmonary nodule, followup non-contrast CT is recommended at 3-6 months from the initial examination and, if    stable at that time, an additional followup is recommended for 18-24 months from the initial examination  The study was marked in [facility] for immediate notification  Workstation performed: [id number]         XR chest 1 view portable   Final Result by Javier Menon MD (07/04 1302)      No acute cardiopulmonary disease  Workstation performed: [id number]                    Procedures  Procedures       Phone Contacts  ED Phone Contact    ED Course                               MDM  Number of Diagnoses or Management Options  Closed fracture of multiple ribs of left side, initial encounter:   Fall, initial encounter:   Lung nodule:   Rib pain on left side:   Diagnosis management comments: Patient is a 77 year female coming in with a fall at home  Portable chest x-ray reveals no acute large pneumothorax  Given patient's age and traumatic injury, will obtain CTs of chest abdomen pelvis as well as EKG, troponin, laboratory data  Will also give patient morphine and Zofran as well as IV fluids  Given patient's location in the pain it with pleuritic symptoms will place patient on O2  Concern for small pneumothoraces  EKG INTERPRETATION at 1:30 p m  RHYTHM:  Normal sinus rhythm at 80 beats per minute  AXIS:  Normal axis  INTERVALS:  NV interval measured at 192 milliseconds  QRS COMPLEX:  QRS measured at 106 millisecond  ST SEGMENT:  Nonspecific ST segment changes  Artifact present  QT INTERVAL:  QTC measured at 449 milliseconds  COMPARED WITH PRIOR no old  Interpretation by Roya Palomino DO    1:39 PM  Patient resting more comfortably in bed  No respiratory distress or extremities of pain  Updated on chest x-ray, EKG and labs  Labs are stable  No NSTEMI  Offered more pain medication however patient refused at this time  Pending CT results  1:56 PM  CT of chest abdomen pelvis reveals for fractures on right side  No other acute abnormalities  Will trial ambulation    2:13 PM  Patient and family updated on CT results including for rib fractures    Discussed with patient and family at length regarding high risk of atelectasis and/or pneumonia given age and multiple rib fractures  She is agreeable for transfer to [place] for pain control, pulmonary toilet Excedrin  Discussed with trauma surgeon on-call through [address]  Patient accepted and will help arrange transport  Amount and/or Complexity of Data Reviewed  Clinical lab tests: ordered and reviewed  Tests in the radiology section of CPT®: reviewed and ordered  Tests in the medicine section of CPT®: reviewed and ordered  Independent visualization of images, tracings, or specimens: yes      CritCare Time    Disposition  Final diagnoses:   Closed fracture of multiple ribs of left side, initial encounter   Fall, initial encounter   Rib pain on left side   Lung nodule     Time reflects when diagnosis was documented in both MDM as applicable and the Disposition within this note     Time User Action Codes Description Comment    7/4/2018  1:55 PM Dereck Lopez Add [S22 42XA] Closed fracture of multiple ribs of left side, initial encounter     7/4/2018  1:55 PM Dereck Lopez Add [I74  XXXA] Fall, initial encounter     7/4/2018  1:55 PM Dereck Lopez Add [R07 81] Rib pain on left side     7/4/2018  1:55 PM Barry Lopez Add [R91 1] Lung nodule       ED Disposition     ED Disposition Condition Comment    Transfer to Another [address]  should be transferred out to B        MD Documentation      Most Recent Value   Patient Condition  The patient has been stabilized such that within reasonable medical probability, no material deterioration of the patient condition or the condition of the unborn child(mau) is likely to result from the transfer   Reason for Transfer  Level of Care needed not available at this facility, No bed available at level of patient's needs   Benefits of Transfer  Specialized equipment and/or services available at the receiving facility (Include comment)________________________, Continuity of care   Risks of Transfer  Potential for delay in receiving treatment, Potential deterioration of medical condition, Loss of IV, Increased discomfort during transfer, Possible worsening of condition or death during transfer      Follow-up Information    None         Patient's Medications   Discharge Prescriptions    No medications on file     No discharge procedures on file      ED Provider  Electronically Signed by           Diann Mendiola,   07/04/18 [place], DO  07/04/18 1505

## 2018-07-05 ENCOUNTER — APPOINTMENT (OUTPATIENT)
Dept: RADIOLOGY | Facility: HOSPITAL | Age: 68
DRG: 200 | End: 2018-07-05
Payer: COMMERCIAL

## 2018-07-05 LAB
ANION GAP SERPL CALCULATED.3IONS-SCNC: 7 MMOL/L (ref 4–13)
ATRIAL RATE: 79 BPM
BUN SERPL-MCNC: 15 MG/DL (ref 5–25)
CALCIUM SERPL-MCNC: 8.9 MG/DL (ref 8.3–10.1)
CHLORIDE SERPL-SCNC: 105 MMOL/L (ref 100–108)
CO2 SERPL-SCNC: 27 MMOL/L (ref 21–32)
CREAT SERPL-MCNC: 0.8 MG/DL (ref 0.6–1.3)
ERYTHROCYTE [DISTWIDTH] IN BLOOD BY AUTOMATED COUNT: 13.7 % (ref 11.6–15.1)
GFR SERPL CREATININE-BSD FRML MDRD: 75 ML/MIN/1.73SQ M
GLUCOSE SERPL-MCNC: 95 MG/DL (ref 65–140)
HCT VFR BLD AUTO: 35.4 % (ref 34.8–46.1)
HGB BLD-MCNC: 11 G/DL (ref 11.5–15.4)
MCH RBC QN AUTO: 28.9 PG (ref 26.8–34.3)
MCHC RBC AUTO-ENTMCNC: 31.1 G/DL (ref 31.4–37.4)
MCV RBC AUTO: 93 FL (ref 82–98)
P AXIS: 58 DEGREES
PLATELET # BLD AUTO: 235 THOUSANDS/UL (ref 149–390)
PMV BLD AUTO: 9.6 FL (ref 8.9–12.7)
POTASSIUM SERPL-SCNC: 3.5 MMOL/L (ref 3.5–5.3)
PR INTERVAL: 192 MS
QRS AXIS: 65 DEGREES
QRSD INTERVAL: 106 MS
QT INTERVAL: 392 MS
QTC INTERVAL: 449 MS
RBC # BLD AUTO: 3.8 MILLION/UL (ref 3.81–5.12)
SODIUM SERPL-SCNC: 139 MMOL/L (ref 136–145)
T WAVE AXIS: 42 DEGREES
VENTRICULAR RATE: 79 BPM
WBC # BLD AUTO: 7.78 THOUSAND/UL (ref 4.31–10.16)

## 2018-07-05 PROCEDURE — 70498 CT ANGIOGRAPHY NECK: CPT

## 2018-07-05 PROCEDURE — 99232 SBSQ HOSP IP/OBS MODERATE 35: CPT | Performed by: SURGERY

## 2018-07-05 PROCEDURE — 97163 PT EVAL HIGH COMPLEX 45 MIN: CPT

## 2018-07-05 PROCEDURE — 70496 CT ANGIOGRAPHY HEAD: CPT

## 2018-07-05 PROCEDURE — G8978 MOBILITY CURRENT STATUS: HCPCS

## 2018-07-05 PROCEDURE — G8979 MOBILITY GOAL STATUS: HCPCS

## 2018-07-05 PROCEDURE — G8987 SELF CARE CURRENT STATUS: HCPCS

## 2018-07-05 PROCEDURE — 80048 BASIC METABOLIC PNL TOTAL CA: CPT | Performed by: EMERGENCY MEDICINE

## 2018-07-05 PROCEDURE — G8988 SELF CARE GOAL STATUS: HCPCS

## 2018-07-05 PROCEDURE — G8989 SELF CARE D/C STATUS: HCPCS

## 2018-07-05 PROCEDURE — 93010 ELECTROCARDIOGRAM REPORT: CPT | Performed by: INTERNAL MEDICINE

## 2018-07-05 PROCEDURE — 99255 IP/OBS CONSLTJ NEW/EST HI 80: CPT | Performed by: NEUROLOGICAL SURGERY

## 2018-07-05 PROCEDURE — 85027 COMPLETE CBC AUTOMATED: CPT | Performed by: EMERGENCY MEDICINE

## 2018-07-05 PROCEDURE — 97166 OT EVAL MOD COMPLEX 45 MIN: CPT

## 2018-07-05 RX ORDER — DOCUSATE SODIUM 100 MG/1
100 CAPSULE, LIQUID FILLED ORAL 2 TIMES DAILY
Status: DISCONTINUED | OUTPATIENT
Start: 2018-07-05 | End: 2018-07-05

## 2018-07-05 RX ORDER — LIDOCAINE 50 MG/G
1 PATCH TOPICAL DAILY
Status: DISCONTINUED | OUTPATIENT
Start: 2018-07-05 | End: 2018-07-06 | Stop reason: HOSPADM

## 2018-07-05 RX ORDER — DOCUSATE SODIUM 100 MG/1
100 CAPSULE, LIQUID FILLED ORAL 2 TIMES DAILY
Status: DISCONTINUED | OUTPATIENT
Start: 2018-07-05 | End: 2018-07-06 | Stop reason: HOSPADM

## 2018-07-05 RX ORDER — ATORVASTATIN CALCIUM 20 MG/1
20 TABLET, FILM COATED ORAL
COMMUNITY
End: 2018-10-31 | Stop reason: SDUPTHER

## 2018-07-05 RX ORDER — VALSARTAN AND HYDROCHLOROTHIAZIDE 320; 25 MG/1; MG/1
1 TABLET, FILM COATED ORAL DAILY
COMMUNITY
End: 2018-11-06

## 2018-07-05 RX ORDER — SENNOSIDES 8.6 MG
1 TABLET ORAL
Status: DISCONTINUED | OUTPATIENT
Start: 2018-07-05 | End: 2018-07-05

## 2018-07-05 RX ORDER — SENNOSIDES 8.6 MG
2 TABLET ORAL
Status: DISCONTINUED | OUTPATIENT
Start: 2018-07-05 | End: 2018-07-06 | Stop reason: HOSPADM

## 2018-07-05 RX ADMIN — ACETAMINOPHEN 650 MG: 325 TABLET ORAL at 18:10

## 2018-07-05 RX ADMIN — METHOCARBAMOL 500 MG: 500 TABLET ORAL at 18:57

## 2018-07-05 RX ADMIN — METHOCARBAMOL 500 MG: 500 TABLET ORAL at 12:48

## 2018-07-05 RX ADMIN — HYDROCHLOROTHIAZIDE 25 MG: 25 TABLET ORAL at 09:09

## 2018-07-05 RX ADMIN — ENOXAPARIN SODIUM 30 MG: 30 INJECTION SUBCUTANEOUS at 20:53

## 2018-07-05 RX ADMIN — SENNOSIDES 17.2 MG: 8.6 TABLET, FILM COATED ORAL at 21:51

## 2018-07-05 RX ADMIN — ENOXAPARIN SODIUM 30 MG: 30 INJECTION SUBCUTANEOUS at 09:10

## 2018-07-05 RX ADMIN — ACETAMINOPHEN 650 MG: 325 TABLET ORAL at 06:13

## 2018-07-05 RX ADMIN — METHOCARBAMOL 500 MG: 500 TABLET ORAL at 06:13

## 2018-07-05 RX ADMIN — DOCUSATE SODIUM 100 MG: 100 CAPSULE, LIQUID FILLED ORAL at 11:09

## 2018-07-05 RX ADMIN — LIDOCAINE 1 PATCH: 50 PATCH CUTANEOUS at 01:50

## 2018-07-05 RX ADMIN — DOCUSATE SODIUM 100 MG: 100 CAPSULE, LIQUID FILLED ORAL at 18:10

## 2018-07-05 RX ADMIN — IOHEXOL 85 ML: 350 INJECTION, SOLUTION INTRAVENOUS at 14:06

## 2018-07-05 RX ADMIN — ACETAMINOPHEN 650 MG: 325 TABLET ORAL at 11:09

## 2018-07-05 NOTE — CASE MANAGEMENT
Initial Clinical Review    Admission: Date/Time/Statement: Observation 7/4 and changed to [place] on 7/5 @ 1619  Pt meets Inpatient criteria for Rib fractures and continued stay pain management    Admitting Physician TIFFANIE BURR    Level of Care Med Surg    Bed Type Trauma    Estimated length of stay More than 2 Midnights    Certification I certify that inpatient services are medically necessary for this patient for a duration of greater than two midnights  See H&P and MD Progress Notes for additional information about the patient's course of treatment  ED: Date/Time/Mode of Arrival: 7/4 Transfer from [facility]    ED Arrival Information     Expected [address] of Arrival Escorted By Service Admission Type    7/4/2018 15:26 7/4/2018 16:28 Urgent [facility] Tra[facility] Emergency    Arrival Complaint    fall,rib fractures          Chief Complaint:   Chief Complaint   Patient presents with    Fall     fell from standing position off a small steep stool about 3-4 ft from the ground, landing on a iron rail on the pts left side  no striking of head no LOC no thinners c/o left rib pain       History of Illness:  77 y o  female who presents with left rib pain s/p mechanical fall at home  Patient states that she was standing on a 3-4 foot step stool when she lost her balance and fell  She states that she landing on her left side onto a wrought iron table  She denies hitting her head or LOC  C/o pain to the left ribs  Patient was seen at [facility] where she had a CT abdomen pelvis with recons of the thoracolumbar spine  She was diagnosed with acute fractures of the left lateral 7th through 10th ribs        ED Vital Signs:   ED Triage Vitals   Temperature Pulse Respirations Blood Pressure SpO2   07/04/18 1630 07/04/18 1630 07/04/18 1630 07/04/18 1630 07/04/18 1630   98 8 °F (37 1 °C) 73 21 166/76 97 %      Temp Source Heart Rate Source Patient Position - Orthostatic VS BP Location FiO2 (%)   07/04/18 1630 07/04/18 1630 07/04/18 1630 07/05/18 0016 --   Oral Monitor Lying Right arm       Pain Score       07/04/18 1630       7        Wt Readings from Last 1 Encounters:   07/04/18 94 3 kg (208 lb)     O2 RA     Vital Signs (abnormal):   07/04/18 1818  --  80  20   173/93  97 %  --  Lying   07/04/18 1800  98 9 °F (37 2 °C)  74  22  159/86  99 %  --  Lying   07/04/18 1730  98 7 °F (37 1 °C)  74  22   174/82  97 %  --  Lying   07/04/18 1715  98 8 °F (37 1 °C)  76  21   191/97             Abnormal Labs:   Alkaline Phosphatase 46 - 116 U/L 117     Total Protein 6 4 - 8 2 g/dL 7 7    Albumin 3 5 - 5 0 g/dL 3 4       07/04/18 1254     Hemoglobin 11 5 - 15 4 g/dL 13 0    Hematocrit 34 8 - 46 1 % 39 2       07/05/18 0535    RBC 3 81 - 5 12 Million/uL 3 80     Hemoglobin 11 5 - 15 4 g/dL 11 0     Hematocrit 34 8 - 46 1 % 35 4        Diagnostic Test Results: CT Chest/ Abd/ Pelvis - 1  Acute fractures of the left lateral 7th, 8th, 9th and 10th ribs  2   No findings for acute intrathoracic, abdominal or pelvic injury  3  A few pulmonary nodules measuring up to 6 mm in size in the left lower lobe    CT Spine Cervical - No cervical spine fracture or traumatic malalignment  Tiny left apical pneumothorax  CT Head -   No acute intracranial abnormality  2   Possible 5 mm aneurysm off the left MCA distribution  Recommend follow-up with MRA or CTA        ED Treatment:   Medication Administration from 07/04/2018 1526 to 07/04/2018 2042       Date/Time Order Dose Route Action Comments   None       Past Medical/Surgical History:    Active Ambulatory Problems     Diagnosis Date Noted    Carpal tunnel syndrome on left 04/08/2016    Accelerated essential hypertension 12/06/2013    Dyslipidemia 07/09/2014    Class 2 obesity due to excess calories with body mass index (BMI) of 36 0 to 36 9 in adult 02/05/2013    Aortic stenosis, mild 03/19/2018    Complex endometrial hyperplasia 06/23/2017    Fatigue 12/14/2012    Female pelvic pain 04/03/2017    Type 2 diabetes mellitus without complication, without long-term current use of insulin ([facility] [address] ) 01/21/2015    Insomnia 03/07/2017    Osteopenia 05/14/2014    Primary osteoarthritis of right knee 05/31/2016    Snoring 07/08/2014    Tendonitis of left hip 06/11/2015    Thickened endometrium 06/01/2017    Asymptomatic varicose veins 06/12/2012    Vitamin D deficiency 06/11/2015     Resolved Ambulatory Problems     Diagnosis Date Noted    Systolic ejection murmur 17/87/2131     Past Medical History:   Diagnosis Date    Arthritis     Carpal tunnel syndrome     Cataract     Decreased body height     Heart murmur     Hypercholesteremia     Hypertension     Motion sickness     Obesity     Periodic heart flutter     Pneumonia     PONV (postoperative nausea and vomiting)     Rosacea     Vertigo     Wears glasses     Wears partial dentures        Admitting Diagnosis: Cerebral aneurysm [I67 1]  Closed fracture of multiple ribs of left side, initial encounter [S22 42XA]  Unspecified multiple injuries, initial encounter [T07  XXXA]    Age/Sex: 77 y o  female    Assessment/Plan:   Trauma Alert: Evaluation  Model of Arrival: Transfer [facility]        Trauma Active Problems:   1  Acute fractures of the left lateral 7-10 ribs   - Pain control  - Pulm toilet/ IS     - add CT Head, CT C-spine       Admission Orders:  Tele monitoring  Neurosurgery cons  PT/OT eval and treat    Scheduled Meds:   Current Facility-Administered Medications:  acetaminophen 650 mg Oral Q6H YULIET   enoxaparin 30 mg Subcutaneous Q12H [address]   hydrochlorothiazide 25 mg Oral Daily   lidocaine 1 patch Transdermal Daily   valsartan-hydrochlorothiazide (DIOVAN /12  5) combo dose  Oral Daily     Continuous Infusions:    PRN Meds:   HYDROmorphone      Methocarbamol   7/5 po x3  7/6 po x1      ondansetron   oxyCODONE      ---------------------------------------------------------------------------------------    7/5 Neurosurgery cons:  Assessment:  1  Traumatic injury to C4-5 2/2 fall  2  Multiple left ribs fracture  3  Left  Lung pneumothorax  4  History of accelerated essential hypertension  5  Aortic stenosis  6  CTS left hand  7  Cerebral aneurysm ( incidental finding)  8  Obesity  9  Diabetic mellitus type 2  10  Osteoarthritis        Plan:   § Exam: A&OX3, communicates well, GCS 15, CN 2-12 are grossly intact  Finger to nose test normal  No pronator drift bilaterally  Good  bilaterally  Motor strength 5/5 throughout symmetrically in both upper and lower extremities  Sensation to light touch and pinprick is normal in all extremities DTR +3 in UEs, and +2 in LEs bilaterally  Normal JPS in all extremities  No clonus and Babinski is negative bilaterally  Cervical spine range of motion is normal and point  tenderness   Tender left lateral chest wall  Gait stable  § Images:  CT of cervical spine on 7/04/2018 demonstrates no cervical spine fracture or traumatic malalignment  § CT of head on 07/04/2018 demonstrates possible 5 mm aneurysm off the left middle cerebral artery distribution  § CTA neck and head ordered today, 07/05/2018     ? Pain control: per primary team  ? DVT ppx: SCDs, continue Lovenox  ? Activity: as tolerated  ? PT/OT evaluation & treatment  ? Brace: None-No neck pain/tenderness/fracture on cervical spine CT  ? Medical Mx: per primary team  ? Neurocheck:  Routine  ? NSx will following  With neuromonitoring and images  Call for concern or problem      -----------------------------------------------------------------------------------    7/5 Physician progress notes:  Summary of Diagnosed Injuries/ Clinical plan:   78 y/o female s/p fall off step ladder     Left lateral 7th, 8th, 9th and 10th rib fractures- continue current pain control regimen and rib fracture protocol   She is pulling 7750 mls on her IS       Left occult apical PTX- not present on f/u CXR (present only on CT Cervical spine)     Left MCA 5 mm aneurysm- F/u with neurosurgery (Dr Pam Daniels) in 2 weeks   Asymptomatic at this time       Acute pain due to trauma- continue current pain regimen       History of HTN- continue home diovan     Proph- SCDs, Lovenox BID

## 2018-07-05 NOTE — PROGRESS NOTES
Pt care rounding with Noel- plan is for patient to be seen by neurosx  Pt pain is controlled with robaxin and tylenol

## 2018-07-05 NOTE — OCCUPATIONAL THERAPY NOTE
[address]  Evaluation     Patient Name: Kathleen Burnham  UEEZH'K Date: 2018  Problem List  Patient Active Problem List   Diagnosis    Carpal tunnel syndrome on left    Accelerated essential hypertension    Dyslipidemia    Class 2 obesity due to excess calories with body mass index (BMI) of 36 0 to 36 9 in adult    Aortic stenosis, mild    Complex endometrial hyperplasia    Fatigue    Female pelvic pain    Type 2 diabetes mellitus without complication, without long-term current use of insulin (HCC)    Insomnia    Osteopenia    Primary osteoarthritis of right knee    Snoring    Tendonitis of left hip    Thickened endometrium    Asymptomatic varicose veins    Vitamin D deficiency    Closed fracture of multiple ribs of left side    Fall    Pneumothorax, left    Cerebral aneurysm     Past Medical History  Past Medical History:   Diagnosis Date    Arthritis     Carpal tunnel syndrome     unspecified laterality    Cataract     Decreased body height     last assessed 3/17/14    Heart murmur     Hypercholesteremia     Hypertension     Motion sickness     Obesity     Periodic heart flutter     Pneumonia     x1    PONV (postoperative nausea and vomiting)     Rosacea     Vertigo     Wears glasses     Wears partial dentures     upper     Past Surgical History  Past Surgical History:   Procedure Laterality Date    CARPAL TUNNEL RELEASE Bilateral      SECTION      x1    COLONOSCOPY      [place] LAPAROSCOPY W TOT HYSTERECTUTERUS <=250 GRAM  W TUBE/OVARY N/A 2017    Procedure: HYSTERECTOMY LAPAROSCOPIC TOTAL, BSO, Cystoscopy;  Surgeon: Chon Jackson MD;  [facility];  Service: Gynecology    AZ REVISE MEDIAN N/CARPAL TUNNEL SURG Left 2016    Procedure: RELEASE CARPAL TUNNEL;  Surgeon: Luis Miguel Smith MD;  [facility];  Service: Orthopedics           18 2525   Note Type   Note type Eval only   Restrictions/Precautions   Weight Bearing Precautions Per Order No   Other Precautions Pain   Pain Assessment   Pain Assessment 0-10   Pain Score 5   Pain Type Acute pain   Pain Location Rib cage   Pain Orientation Left   Hospital Pain Intervention(s) Ambulation/increased activity   Response to Interventions tolerated   Home Living   Type of [facility] Two level   Bathroom Shower/Tub Tub/shower unit   Bathroom Toilet Standard   Bathroom Equipment Shower chair  (Denies use at baseline )   Bathroom Accessibility Accessible   Prior Function   Level of [place] Independent with ADLs and functional mobility   Lives With Spouse   Receives Help From Family   ADL Assistance Independent   IADLs Independent   Falls in the last 6 months 1 to 4  (1)   Vocational Full time employment   Lifestyle   Autonomy Pt reports she was independent with ADLs/IADLs/driving PTA  Reciprocal Relationships Pt lives with spouse  Pt reports daugthers can provide assistance when needed upon d/c  Pt's daugther was in the room during the session  Service to Others 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  Intrinsic Gratification Pt enjoys spending time with family and keeping busy  Subjective   Subjective Pt reports, "I have more pain with certain movements "   ADL   Eating Assistance 5  Supervision/Setup   Grooming Assistance 5  Supervision/Setup   UB Bathing Assistance 5  Supervision/Setup    S  Emily  5  [id number] [facility] 4  Minimal Assistance   Bed Mobility   Additional Comments Unable to assess 2' pt sitting EOB eating lunch      Transfers   Sit to Stand 5  Supervision   Additional items Increased time required   Stand to Sit 5  Supervision   Additional items Increased time required   Functional Mobility   Functional Mobility 5  Supervision   Additional items (No AD) Balance   Static Sitting Good   Dynamic Sitting Fair +   Static Standing Fair +   Dynamic Standing Fair   Ambulatory Fair   Activity Tolerance   Activity Tolerance Patient tolerated treatment well   Medical Staff Made Aware CM   Nurse Made Aware Appropriate to see per RN  RUE Assessment   RUE Assessment WFL   LUE Assessment   LUE Assessment X   LUE Strength   LUE Overall Strength Due to pain   Hand Function   Gross Motor Coordination Functional   Fine Motor Coordination Functional   Sensation   Light Touch No apparent deficits   Vision-Basic Assessment   Current Vision Wears glasses all the time   Visual History Cataracts   Cognition   Overall Cognitive Status WFL   Arousal/Participation Alert; Responsive; Cooperative   Attention Within functional limits   Orientation Level Oriented X4   Memory Within functional limits   Following Commands Follows multistep commands without difficulty   Comments Pt is pleasant and cooperative to work with  Pt required verbal cues for carryover of deep breathing techniques  Assessment   Assessment Pt is a 77 y o  female seen for initial OT evaluation after sustaining L 7-10 rib fractures, L pneumothorax, and incidental cerebral aneurysm as a result of a fall  Pt denies LOC/headstrike  Pt's past medical history includes hypertension, hyperlipidemia, arthritis, carpal tunnel syndrome, cataract, heart murmur, hypertension, obesity, vertigo, rosacea, and pneumonia  Pt lives in a 2 story home with  with 3 EUGENIA  Pt denies use of DME at baseline and reports she was independent with ADLs/IADLs/driving PTA  Pt at EOB eating lunch at beginning of session rating pain at 5/10 and pt's daugther in room  Pt performed sit <-> stand transfer and functional mobility at S level with no use of AD  Pt performed bed mobility onto stretcher for transportation to CAT scan at MIN A level  At this time, pt is overall at S level with UB ADLs and MIN A for LB ADLs   Pt's expected limitations include pain, decreased activity tolerance, history of vertigo, limited L UE ROM due to pain, and SOB  From an OT perspective, pt can return home with increased family support  Pt reports daughters can provide assistance when needed upon d/c  Pt encouraged to continue to participate in self care while in hospital and perform functional mobility with staff  Pt educated on deep breathing techniques, to use compensatory strategies during functional tasks, and for carryover of energy conservation techniques upon d/c  Pt reports she sits in tub to take baths  Pt also educated on difficulty of sitting in tub while showering and pt recommended to use SC  Pt agreed  All questions/concerns addressed  No additional acute care needs  D/C OT  Goals   Patient Goals to return home    Recommendation   OT Discharge Recommendation Home with family support   Equipment Recommended (Pt owns SC   Pt recommended to use SC upon d/c  )   OT - OK to Discharge Yes  (when medically cleared )   Barthel Index   Feeding 10   Bathing 0   Grooming Score 5   Dressing Score 5   Bladder Score 10   Bowels Score 10   Toilet Use Score 5   Transfers (Bed/Chair) Score 10   Mobility (Level Surface) Score 10   Stairs Score 0   Barthel Index Score 65   Modified [place] Scale   Modified [place] Scale 2     YAN Lucero

## 2018-07-05 NOTE — PLAN OF CARE

## 2018-07-05 NOTE — ASSESSMENT & PLAN NOTE
- Pulmonary toilet  - Incentive Spirometry - patient currently ascertaining 1875  - Repeat CXR showed no Pneumothorax  - Saturation at 96-97%  - CTA b/l  - Continue to follow exam  - Prn pain management

## 2018-07-05 NOTE — PHYSICAL THERAPY NOTE
Physical Therapy Evaluation    Patient's Name:Delmi Pedroza    Today's Date:18    Patient Active Problem List   Diagnosis    Carpal tunnel syndrome on left    Accelerated essential hypertension    Dyslipidemia    Class 2 obesity due to excess calories with body mass index (BMI) of 36 0 to 36 9 in adult    Aortic stenosis, mild    Complex endometrial hyperplasia    Fatigue    Female pelvic pain    Type 2 diabetes mellitus without complication, without long-term current use of insulin (HCC)    Insomnia    Osteopenia    Primary osteoarthritis of right knee    Snoring    Tendonitis of left hip    Thickened endometrium    Asymptomatic varicose veins    Vitamin D deficiency    Closed fracture of multiple ribs of left side    Fall    Pneumothorax, left    Cerebral aneurysm       Past Medical History:   Diagnosis Date    Arthritis     Carpal tunnel syndrome     unspecified laterality    Cataract     Decreased body height     last assessed 3/17/14    Heart murmur     Hypercholesteremia     Hypertension     Motion sickness     Obesity     Periodic heart flutter     Pneumonia     x1    PONV (postoperative nausea and vomiting)     Rosacea     Vertigo     Wears glasses     Wears partial dentures     upper       Past Surgical History:   Procedure Laterality Date    CARPAL TUNNEL RELEASE Bilateral      SECTION      x1    COLONOSCOPY      [place] LAPAROSCOPY W TOT HYSTERECTUTERUS <=250 GRAM  W TUBE/OVARY N/A 2017    Procedure: HYSTERECTOMY LAPAROSCOPIC TOTAL, BSO, Cystoscopy;  Surgeon: Shira Wakefield MD;  [facility];  Service: Gynecology    MI REVISE MEDIAN N/CARPAL TUNNEL SURG Left 2016    Procedure: RELEASE CARPAL TUNNEL;  Surgeon: Awa Philippe MD;  [facility];  Service: Orthopedics        18 1205   Pain Assessment   Pain Assessment 0-10   Pain Score 4   Pain Location Rib cage   Pain Orientation Left   Hospital Pain Intervention(s) Ambulation/increased activity   Response to Interventions unchanged   Home Living   Type of Home House   Home Layout Multi-level   Prior Function   Level of [place] Independent with ADLs and functional mobility   Lives With Spouse   Receives Help From Family   Restrictions/Precautions   Other Precautions Pain   General   Family/Caregiver Present No   Cognition   Arousal/Participation Alert   Orientation Level Oriented X4   Following Commands Follows all commands and directions without difficulty   RUE Assessment   RUE Assessment (pain limited elev and reach)   LUE Assessment   LUE Assessment (funct reach and grasp)   RLE Assessment   RLE Assessment X   Strength RLE   RLE Overall Strength 4/5   LLE Assessment   LLE Assessment X   Strength LLE   LLE Overall Strength 4/5   Coordination   Movements are Fluid and Coordinated 0   Coordination and Movement Description antalgic postural guarding   Bed Mobility   Additional Comments bed mob NT- pt in chair on PT arrival   Transfers   Sit to Stand 5  Supervision   Additional items Increased time required;Verbal cues   Stand to Sit 5  Supervision   Additional items Increased time required;Verbal cues   Stand pivot 5  Supervision   Additional items Increased time required;Verbal cues   Ambulation/Elevation   Gait pattern Improper Weight shift; Inconsistent luis carlos; Excessively slow   Gait Assistance 5  Supervision   Additional items Verbal cues   Assistive Device None   Distance 220 ft   Stair Management Assistance 5  Supervision   Additional items Verbal cues   Stair Management Technique Step to pattern; One rail L   Number of Stairs 15   Balance   Dynamic Sitting Fair +  (multidirectional reach)   Static Standing Fair +   Dynamic Standing Fair -   Endurance Deficit   Endurance Deficit Yes   Endurance Deficit Description antalgic LE instabililty   Activity Tolerance   Activity Tolerance Patient limited by pain   Medical Staff Made Aware TR   Nurse Made Aware yes   Assessment   Prognosis Good   Problem List Decreased range of motion;Decreased endurance;Decreased strength; Impaired balance;Decreased mobility; Decreased coordination;Decreased safety awareness; Impaired judgement;Pain   Assessment Pt is a 77 y o  female admitted [facility] on 7/4/2018 w/ Closed fracture of multiple ribs of left side; R 8, 9 also sustained ptx Pt exhibits significant impairments with weakness, decreased ROM, impaired balance, decreased endurance, impaired coordination, gait deviations, pain, decreased activity tolerance, decreased functional mobility tolerance, decreased safety awareness, impaired judgement, fall risk, orthopedic restrictions and decreased skin integrity; these impact independence with mobility, ADLs, and IADLs; requires supervision w transf and amb 220 ft using blanket roll splint t against rib cage to minimize rib cage mob during amb; gait pattern w antalgic postural guarding, no LOB noted on level surface; objective measures on the Barthel Index also reveal limitations;  therapy prognosis is impacted by relevant co morbidities as noted in evaluation; clinical presentation is currently unstable/unpredictable - pt is on cont pulse oximetry, presents w pain and phys impairments as noted- a regression from baseline ; PTA, pt was Independent with mobility lives in multilevel w fam support available skilled PT is indicated to to optimize functional independence and discharge planning; pending functional progress, PT recommendation at discharge is home with family support    Goals   Patient Goals go home   STG Expiration Date 07/14/18   Short Term Goal #1 1  Modified Independent with Bed Mobility Rolling Right and Left     2  Modified Independent with Bed Mobility Supine-Sit     3  Modified Independent with Transfer Bed-Chair     4  Increase Dynamic Sitting Balance at least 1 Grade for improved stability with functional reach activities     5  Increase Dynamic Standing Balance at least 1 Grade for improved ease with Activities of Daily Living     6  Increase Lower Extremity Strength at least 1 Grade for improved ease mobility tasks     7  Modified Independent with  Ambulation 200 feet using RW RLE WBAT to facilitate home and community mobility 8  Modified Independent with Ascending/Descending 6 steps to facilitate home and community accessibility  Plan   Treatment/Interventions Functional transfer training;LE strengthening/ROM; Elevations; Therapeutic exercise; Endurance training;Cognitive reorientation;Patient/family training;Equipment eval/education; Bed mobility;Gait training; Compensatory technique education;Continued evaluation;Spoke to nursing   PT Frequency 2-3x/wk   Recommendation   Recommendation Home with family support   Barthel Index   Feeding 10   Bathing 0   Grooming Score 0   Dressing Score 5   Bladder Score 10   Bowels Score 10   Toilet Use Score 5   Transfers (Bed/Chair) Score 10   Mobility (Level Surface) Score 10   Stairs Score 5   Barthel Index Score 65

## 2018-07-05 NOTE — SOCIAL WORK
CM met with pt to discuss the role of CM  Pt lives with her  in a 2 story home which has 3STE and 15 steps inside  Pt works, drives, and was fully independent PTA  Pt has no living will or DME  Pt's pharmacy is [facility]  Pt has no hx of mental health, substance abuse, or IP rehab  Pt's  will transport home  CM will follow up for recs

## 2018-07-05 NOTE — PLAN OF CARE
Problem: PHYSICAL THERAPY ADULT  Goal: Performs mobility at highest level of function for planned discharge setting  See evaluation for individualized goals  Treatment/Interventions: Functional transfer training, LE strengthening/ROM, Elevations, Therapeutic exercise, Endurance training, Cognitive reorientation, Patient/family training, Equipment eval/education, Bed mobility, Gait training, Compensatory technique education, Continued evaluation, Spoke to nursing          See flowsheet documentation for full assessment, interventions and recommendations    Prognosis: Good  Problem List: Decreased range of motion, Decreased endurance, Decreased strength, Impaired balance, Decreased mobility, Decreased coordination, Decreased safety awareness, Impaired judgement, Pain  Assessment: Pt is a 77 y o  female admitted to [facility] on 7/4/2018 w/ Closed fracture of multiple ribs of left side; R 8, 9 also sustained ptx Pt exhibits significant impairments with weakness, decreased ROM, impaired balance, decreased endurance, impaired coordination, gait deviations, pain, decreased activity tolerance, decreased functional mobility tolerance, decreased safety awareness, impaired judgement, fall risk, orthopedic restrictions and decreased skin integrity; these impact independence with mobility, ADLs, and IADLs; requires supervision w transf and amb 220 ft using blanket roll splint t against rib cage to minimize rib cage mob during amb; gait pattern w antalgic postural guarding, no LOB noted on level surface; objective measures on the Barthel Index also reveal limitations;  therapy prognosis is impacted by relevant co morbidities as noted in evaluation; clinical presentation is currently unstable/unpredictable - pt is on cont pulse oximetry, presents w pain and phys impairments as noted- a regression from baseline ; PTA, pt was Independent with mobility lives in multilevel w fam support available skilled PT is indicated to to optimize functional independence and discharge planning; pending functional progress, PT recommendation at discharge is home with family support         Recommendation: Home with family support          See flowsheet documentation for full assessment

## 2018-07-05 NOTE — PHYSICAL THERAPY NOTE
Physical Therapy Evaluation    Patient's Name:Delmi Pedroza    Today's Date:18    Patient Active Problem List   Diagnosis    Carpal tunnel syndrome on left    Accelerated essential hypertension    Dyslipidemia    Class 2 obesity due to excess calories with body mass index (BMI) of 36 0 to 36 9 in adult    Aortic stenosis, mild    Complex endometrial hyperplasia    Fatigue    Female pelvic pain    Type 2 diabetes mellitus without complication, without long-term current use of insulin (HCC)    Insomnia    Osteopenia    Primary osteoarthritis of right knee    Snoring    Tendonitis of left hip    Thickened endometrium    Asymptomatic varicose veins    Vitamin D deficiency    Closed fracture of multiple ribs of left side    Fall    Pneumothorax, left    Cerebral aneurysm       Past Medical History:   Diagnosis Date    Arthritis     Carpal tunnel syndrome     unspecified laterality    Cataract     Decreased body height     last assessed 3/17/14    Heart murmur     Hypercholesteremia     Hypertension     Motion sickness     Obesity     Periodic heart flutter     Pneumonia     x1    PONV (postoperative nausea and vomiting)     Rosacea     Vertigo     Wears glasses     Wears partial dentures     upper       Past Surgical History:   Procedure Laterality Date    CARPAL TUNNEL RELEASE Bilateral      SECTION      x1    COLONOSCOPY      ND LAPAROSCOPY W TOT HYSTERECTUTERUS <=250 GRAM  W TUBE/OVARY N/A 2017    Procedure: HYSTERECTOMY LAPAROSCOPIC TOTAL, BSO, Cystoscopy;  Surgeon: Micheal Ewing MD;  [facility];  Service: Gynecology    ND REVISE MEDIAN N/CARPAL TUNNEL SURG Left 2016    Procedure: RELEASE CARPAL TUNNEL;  Surgeon: Marco A Stevens MD;  [facility];  Service: Orthopedics        18 1205   Pain Assessment   Pain Assessment 0-10   Pain Score 4   Pain Location Rib cage   Pain Orientation Left   Hospital Pain Intervention(s) Ambulation/increased activity   Response to Interventions unchanged   Home Living   Type of Home House   Home Layout Multi-level   Prior Function   Level of [place] Independent with ADLs and functional mobility   Lives With Spouse   Receives Help From Family   Restrictions/Precautions   Other Precautions Pain   General   Family/Caregiver Present No   Cognition   Arousal/Participation Alert   Orientation Level Oriented X4   Following Commands Follows all commands and directions without difficulty   RUE Assessment   RUE Assessment (pain limited elev and reach)   LUE Assessment   LUE Assessment (funct reach and grasp)   RLE Assessment   RLE Assessment X   Strength RLE   RLE Overall Strength 4/5   LLE Assessment   LLE Assessment X   Strength LLE   LLE Overall Strength 4/5   Coordination   Movements are Fluid and Coordinated 0   Coordination and Movement Description antalgic postural guarding   Bed Mobility   Additional Comments bed mob NT- pt in chair on PT arrival   Transfers   Sit to Stand 5  Supervision   Additional items Increased time required;Verbal cues   Stand to Sit 5  Supervision   Additional items Increased time required;Verbal cues   Stand pivot 5  Supervision   Additional items Increased time required;Verbal cues   Ambulation/Elevation   Gait pattern Improper Weight shift; Inconsistent luis carlos; Excessively slow   Gait Assistance 5  Supervision   Additional items Verbal cues   Assistive Device None   Distance 220 ft   Stair Management Assistance 5  Supervision   Additional items Verbal cues   Stair Management Technique Step to pattern; One rail L   Number of Stairs 15   Balance   Dynamic Sitting Fair +  (multidirectional reach)   Static Standing Fair +   Dynamic Standing Fair -   Endurance Deficit   Endurance Deficit Yes   Endurance Deficit Description antalgic LE instabililty   Activity Tolerance   Activity Tolerance Patient limited by pain   Medical Staff Made Aware TR   Nurse Made Aware yes   Assessment   Prognosis Good   Problem List Decreased range of motion;Decreased endurance;Decreased strength; Impaired balance;Decreased mobility; Decreased coordination;Decreased safety awareness; Impaired judgement;Pain   Assessment Pt is a 77 y o  female admitted to [facility] on 7/4/2018 w/ Closed fracture of multiple ribs of left side7-10 Pt exhibits significant impairments with weakness, decreased ROM, impaired balance, decreased endurance, impaired coordination, gait deviations, pain, decreased activity tolerance, decreased functional mobility tolerance, decreased safety awareness, impaired judgement, fall risk, orthopedic restrictions and decreased skin integrity; these impact independence with mobility, ADLs, and IADLs; requires supervision w transf and amb 220 ft using blanket roll splint against rib cage to minimize rib cage mob during amb; gait pattern w antalgic postural guarding, no LOB noted on level surface; objective measures on the Barthel Index also reveal limitations;  therapy prognosis is impacted by relevant co morbidities as noted in evaluation; clinical presentation is currently unstable/unpredictable - pt is on cont pulse oximetry, presents w pain and phys impairments as noted- a regression from baseline ; PTA, pt was Independent with mobility lives in multilevel w fam support available skilled PT is indicated to to optimize functional independence and discharge planning; pending functional progress, PT recommendation at discharge is home with family support    Goals   Patient Goals go home   STG Expiration Date 07/14/18   Short Term Goal #1 1  Modified Independent with Bed Mobility Rolling Right and Left 2  Modified Independent with Bed Mobility Supine-Sit 3  Modified Independent with Transfer Bed-Chair 4  Increase Dynamic Sitting Balance at least 1 Grade for improved stability with functional reach activities 5  Increase Dynamic Standing Balance at least 1 Grade for improved ease with Activities of Daily Living 6  Increase Lower Extremity Strength at least 1 Grade for improved ease mobility tasks 7  Modified Independent with Ambulation 200 feet using RW  to facilitate home and community mobility 8  Modified Independent with Ascending/Descending 6 steps to facilitate home and community accessibility  Plan   Treatment/Interventions Functional transfer training;LE strengthening/ROM; Elevations; Therapeutic exercise; Endurance training;Cognitive reorientation;Patient/family training;Equipment eval/education; Bed mobility;Gait training; Compensatory technique education;Continued evaluation;Spoke to nursing   PT Frequency 2-3x/wk   Recommendation   Recommendation Home with family support   Barthel Index   Feeding 10   Bathing 0   Grooming Score 0   Dressing Score 5   Bladder Score 10   Bowels Score 10   Toilet Use Score 5   Transfers (Bed/Chair) Score 10   Mobility (Level Surface) Score 10   Stairs Score 5   Barthel Index Score 65

## 2018-07-05 NOTE — CONSULTS
Consultation - Neurosurgery   Janell Angela 77 y o  female MRN: [id number]  Unit/Bed#: [facility] 931-01 Encounter: [id number]      Inpatient consult to Neurosurgery  Consult performed by: Garcia Monaco ordered by: Parveen Thomas          Assessment/Plan               Assessment:  1  Traumatic injury to C4-5 2/2 fall  2  Multiple left ribs fracture  3  Left  Lung pneumothorax  4  History of accelerated essential hypertension  5  Aortic stenosis  6  Hx of carpal tunnel release left hand  7  Cerebral aneurysm ( incidental finding)  8  Obesity  9  Diabetic mellitus type 2  10  Osteoarthritis      Plan:   · Exam: A&OX3, communicates well, GCS 15, CN 2-12 are grossly intact  Finger to nose test normal  No pronator drift bilaterally  Good  bilaterally  Motor strength 5/5 throughout symmetrically in both upper and lower extremities  Sensation to light touch and pinprick is normal in all extremities DTR +3 in UEs, and +2 in LEs bilaterally  Normal JPS in all extremities  No clonus and Babinski is negative bilaterally  Cervical spine range of motion is normal and point  tenderness   Tender left lateral chest wall  Gait stable  · Images:  CT of cervical spine on 7/04/2018 demonstrates no cervical spine fracture or traumatic malalignment  · CT of head on 07/04/2018 demonstrates possible 5 mm aneurysm off the left middle cerebral artery distribution  · CTA neck and head ordered today, 07/05/2018    · Pain control: per primary team  · DVT ppx: SCDs, continue Lovenox  · Activity: as tolerated  · PT/OT evaluation & treatment  · Brace: None-No neck pain/tenderness/fracture on cervical spine CT  · Medical Mx: per primary team  · Neurocheck:  Routine  · NSx will following  With neuromonitoring and images  · CTA neck and head 07/05/2018 demonstrates small left MCA cerebral aneurysm- official report  Pending  Patient is stable  Discussed with Dr Kathy Basurto   He reviewed the image ( Ectasia vs Aneurysm)-recommends patient can F/U with Dr Du Santa in two weeks at [facility] [address]  clinic  Will sign off  Call for concern or problem  HPI: Tatyana Fernandez is a 77y o  year old female with PMHxi of hypertension, and murmur admitted with multiple left rib fractures secondary to fall from 2 steps ladder  Patient missed a step and fell down to the left side, denies hitting her head or neck  Currently complaining of left lateral chest pain, exacerbated by movement  Denies loss of consciousness, seizure, headache, blurry vision, diplopia, nausea, or vomiting  Denies weakness, numbness, paresthesia in the extremities  Patient denies any cough, or fever  Denies bowel or bladder issues  She denies history of diabetes mellitus, congestive heart failure except nonpathologic murmur  Denies history of stroke, bleeding disorder or taking anticoagulant medications  Denies history of smoking or drinking alcohol  She is allergic to penicillin with skin rashes  She gave  history of brain aneurysm in both her maternal parents  CT of the head demonstrates possible 5 mm and rhythm of left middle cerebral artery distribution  CT cervical spine demonstrates no cervical spine fracture  Review of Systems   Constitutional: Negative for activity change, chills, diaphoresis and fever  HENT: Negative for trouble swallowing and voice change  Eyes: Negative for photophobia and visual disturbance  Respiratory: Negative for cough, chest tightness, shortness of breath and stridor  Cardiovascular: Negative for chest pain and leg swelling  Gastrointestinal: Negative for constipation, diarrhea and nausea  Genitourinary: Negative for difficulty urinating  Musculoskeletal: Negative for back pain, gait problem, neck pain and neck stiffness  Neurological: Negative for dizziness, tremors, seizures, syncope, facial asymmetry, speech difficulty, weakness, light-headedness, numbness and headaches  Psychiatric/Behavioral: Negative for agitation and confusion         Historical Information   Past Medical History:   Diagnosis Date    Arthritis     Carpal tunnel syndrome     unspecified laterality    Cataract     Decreased body height     last assessed 3/17/14    Heart murmur     Hypercholesteremia     Hypertension     Motion sickness     Obesity     Periodic heart flutter     Pneumonia     x1    PONV (postoperative nausea and vomiting)     Rosacea     Vertigo     Wears glasses     Wears partial dentures     upper     Past Surgical History:   Procedure Laterality Date    CARPAL TUNNEL RELEASE Bilateral      SECTION      x1    COLONOSCOPY      [place] LAPAROSCOPY W TOT HYSTERECTUTERUS <=250 GRAM  W TUBE/OVARY N/A 2017    Procedure: HYSTERECTOMY LAPAROSCOPIC TOTAL, BSO, Cystoscopy;  Surgeon: Johnnie Crigler, MD;  [facility];  Service: Gynecology    WI REVISE MEDIAN N/CARPAL TUNNEL SURG Left 2016    Procedure: RELEASE CARPAL TUNNEL;  Surgeon: Amil Hatchet, MD;  [facility];  Service: Orthopedics     History   Alcohol Use No     History   Drug Use No     History   Smoking Status    Never Smoker   Smokeless Tobacco    Never Used     Family History   Problem Relation Age of Onset    Arthritis Mother     Hypertension Mother     Stroke Mother         syndrome    Diabetes Father     Sudden death Father         instantaneous cardiac    Hyperlipidemia Sister     Sudden death Brother         instananeous cardiac       Meds/Allergies   all current active meds have been reviewed and current meds:   Current Facility-Administered Medications   Medication Dose Route Frequency    acetaminophen (TYLENOL) tablet 650 mg  650 mg Oral Q6H [address]    docusate sodium (COLACE) capsule 100 mg  100 mg Oral BID    enoxaparin (LOVENOX) subcutaneous injection 30 mg  30 mg Subcutaneous Q12H [address]    hydrochlorothiazide (HYDRODIURIL) tablet 25 mg  25 mg Oral Daily    HYDROmorphone (DILAUDID) injection 0 2 mg  0 2 mg Intravenous Q3H PRN    lidocaine (LIDODERM) 5 % patch 1 patch  1 patch Transdermal Daily    methocarbamol (ROBAXIN) tablet 500 mg  500 mg Oral Q6H PRN    ondansetron (ZOFRAN) injection 4 mg  4 mg Intravenous Q6H PRN    oxyCODONE (ROXICODONE) IR tablet 2 5 mg  2 5 mg Oral Q4H PRN    oxyCODONE (ROXICODONE) IR tablet 5 mg  5 mg Oral Q4H PRN    senna (SENOKOT) tablet 8 6 mg  1 tablet Oral HS    valsartan-hydrochlorothiazide (DIOVAN /12  5) combo dose   Oral Daily     Allergies   Allergen Reactions    Penicillins Hives and Rash       Objective   I/O       07/03 0701 - 07/04 0700 07/04 0701 - 07/05 0700 07/05 0701 - 07/06 0700    P  O   220     Total Intake(mL/kg)  220 (2 3)     Net   +220             Unmeasured Urine Occurrence  2 x           Physical Exam   Constitutional: She is oriented to person, place, and time  Eyes: EOM are normal    Neurological: She is oriented to person, place, and time  She has a normal Finger-Nose-Finger Test  Gait normal    Reflex Scores:       Tricep reflexes are 3+ on the right side and 3+ on the left side  Bicep reflexes are 3+ on the right side and 3+ on the left side  Brachioradialis reflexes are 3+ on the right side and 3+ on the left side  Patellar reflexes are 2+ on the right side and 2+ on the left side  Achilles reflexes are 2+ on the right side and 2+ on the left side  Psychiatric: Her speech is normal      Neurologic Exam     Mental Status   Oriented to person, place, and time  Registration: recalls 1 of 3 objects  Attention: normal  Concentration: normal    Speech: speech is normal   Level of consciousness: alert  Knowledge: good  Able to name object  Cranial Nerves     CN II   Visual fields full to confrontation  CN III, IV, VI   Extraocular motions are normal    Right pupil: Size: 2 mm  Shape: regular  Reactivity: brisk  Left pupil: Shape: regular  Reactivity: brisk       CN V   Right facial sensation deficit: none  Left facial sensation deficit: none    CN VII   Right facial weakness: none  Left facial weakness: none    CN XI   Right sternocleidomastoid strength: normal  Left sternocleidomastoid strength: normal  Right trapezius strength: normal  Left trapezius strength: normal    CN XII   Tongue: not atrophic  Fasciculations: absent  Tongue deviation: none    Motor Exam   Muscle bulk: normal  Overall muscle tone: normal  Right arm tone: normal  Left arm tone: normal  Right arm pronator drift: absent  Left arm pronator drift: absent  Right leg tone: normal  Left leg tone: normal    Strength   Right neck flexion: 5/5  Left neck flexion: 5/5  Right neck extension: 5/5  Left neck extension: 5/5  Right deltoid: 5/5  Left deltoid: 5/5  Right biceps: 5/5  Left biceps: 5/5  Right triceps: 5/5  Left triceps: 5/5  Right wrist flexion: 5/5  Left wrist flexion: 5/5  Right wrist extension: 5/5  Left wrist extension: 5/5  Right interossei: 5/5  Left interossei: 5/5  Right abdominals: 5/5  Left abdominals: 5/5  Right iliopsoas: 5/5  Left iliopsoas: 5/5  Right quadriceps: 5/5  Left quadriceps: 5/5  Right hamstrin/5  Left hamstrin/5  Right glutei: 5/5  Left glutei: 5/5  Right anterior tibial: 5/5  Left anterior tibial: 5/5  Right posterior tibial: 5/5  Left posterior tibial: 5/5  Right peroneal: 5/5  Left peroneal: 5/5  Right gastroc: 5/5  Left gastroc: 5/5    Sensory Exam   Light touch normal    Proprioception normal    Pinprick normal      Gait, Coordination, and Reflexes     Gait  Gait: normal    Coordination   Finger to nose coordination: normal    Tremor   Resting tremor: absent    Reflexes   Right brachioradialis: 3+  Left brachioradialis: 3+  Right biceps: 3+  Left biceps: 3+  Right triceps: 3+  Left triceps: 3+  Right patellar: 2+  Left patellar: 2+  Right achilles: 2+  Left achilles: 2+  Left : 4+  Right plantar: normal  Left plantar: normal  Right Hernández: absent  Left Hernández: absent  Right ankle clonus: absent  Left ankle clonus: absent      Vitals:Blood pressure 137/73, pulse 65, temperature 97 9 °F (36 6 °C), temperature source Oral, resp  rate 16, weight 94 3 kg (208 lb), SpO2 96 %  ,Body mass index is 34 61 kg/m²  Lab Results:     Results from last 7 days  Lab Units 07/05/18  0535 07/04/18  1254   WBC Thousand/uL 7 78 9 01   HEMOGLOBIN g/dL 11 0* 13 0   HEMATOCRIT % 35 4 39 2   PLATELETS Thousands/uL 235 291   MONO PCT MAN %  --  2*       Results from last 7 days  Lab Units 07/05/18  0535 07/04/18  1254   SODIUM mmol/L 139 144   POTASSIUM mmol/L 3 5 3 8   CHLORIDE mmol/L 105 107   CO2 mmol/L 27 26   BUN mg/dL 15 18   CREATININE mg/dL 0 80 0 98   CALCIUM mg/dL 8 9 9 5   TOTAL PROTEIN g/dL  --  7 7   BILIRUBIN TOTAL mg/dL  --  0 20   ALK PHOS U/L  --  117*   ALT U/L  --  22   AST U/L  --  18   GLUCOSE RANDOM mg/dL 95 114       Results from last 7 days  Lab Units 07/04/18  1254   MAGNESIUM mg/dL 2 0       Results from last 7 days  Lab Units 07/04/18  1254   PHOSPHORUS mg/dL 2 9       Results from last 7 days  Lab Units 07/04/18  1254   INR  1 06   PTT seconds 31     No results found for: TROPONINT  ABG:No results found for: PHART, YUC5HFG, PO2ART, YNH4AYL, Y1NJUKMZ, BEART, SOURCE    Imaging Studies: I have personally reviewed pertinent reports  and I have personally reviewed pertinent films in PACS    EKG, Pathology, and Other Studies: I have personally reviewed pertinent reports  and I have personally reviewed pertinent films in PACS    VTE Prophylaxis: Sequential compression device (Venodyne)     Code Status: Level 2 - DNAR: but accepts endotracheal intubation  Advance Directive and Living Will:      Power of :    POLST:      Counseling / Coordination of Care  I spent 20 minutes with the patient

## 2018-07-05 NOTE — PLAN OF CARE
Problem: Potential for Falls  Goal: Patient will remain free of falls  INTERVENTIONS:  - Assess patient frequently for physical needs  -  Identify cognitive and physical deficits and behaviors that affect risk of falls    -  [place] fall precautions as indicated by assessment   - Educate patient/family on patient safety including physical limitations  - Instruct patient to call for assistance with activity based on assessment  - Modify environment to reduce risk of injury  - Consider OT/PT consult to assist with strengthening/mobility   Outcome: Progressing      Problem: PAIN - ADULT  Goal: Verbalizes/displays adequate comfort level or baseline comfort level  Interventions:  - Encourage patient to monitor pain and request assistance  - Assess pain using appropriate pain scale  - Administer analgesics based on type and severity of pain and evaluate response  - Implement non-pharmacological measures as appropriate and evaluate response  - Consider cultural and social influences on pain and pain management  - Notify physician/advanced practitioner if interventions unsuccessful or patient reports new pain   Outcome: Progressing      Problem: INFECTION - ADULT  Goal: Absence or prevention of progression during hospitalization  INTERVENTIONS:  - Assess and monitor for signs and symptoms of infection  - Monitor lab/diagnostic results  - Monitor all insertion sites, i e  indwelling lines, tubes, and drains  - Monitor endotracheal (as able) and nasal secretions for changes in amount and color  - [place] appropriate cooling/warming therapies per order  - Administer medications as ordered  - Instruct and encourage patient and family to use good hand hygiene technique  - Identify and instruct in appropriate isolation precautions for identified infection/condition   Outcome: Progressing      Problem: SAFETY ADULT  Goal: Maintain or return to baseline ADL function  INTERVENTIONS:  -  Assess patient's ability to carry out ADLs; assess patient's baseline for ADL function and identify physical deficits which impact ability to perform ADLs (bathing, care of mouth/teeth, toileting, grooming, dressing, etc )  - Assess/evaluate cause of self-care deficits   - Assess range of motion  - Assess patient's mobility; develop plan if impaired  - Assess patient's need for assistive devices and provide as appropriate  - Encourage maximum independence but intervene and supervise when necessary  ¯ Involve family in performance of ADLs  ¯ Assess for home care needs following discharge   ¯ Request OT consult to assist with ADL evaluation and planning for discharge  ¯ Provide patient education as appropriate   Outcome: Progressing    Goal: Maintain or return mobility status to optimal level  INTERVENTIONS:  - Assess patient's baseline mobility status (ambulation, transfers, stairs, etc )    - Identify cognitive and physical deficits and behaviors that affect mobility  - Identify mobility aids required to assist with transfers and/or ambulation (gait belt, sit-to-stand, lift, walker, cane, etc )  - [place] fall precautions as indicated by assessment  - Record patient progress and toleration of activity level on Mobility SBAR; progress patient to next Phase/Stage  - Instruct patient to call for assistance with activity based on assessment  - Request Rehabilitation consult to assist with strengthening/weightbearing, etc    Outcome: Progressing      Problem: DISCHARGE PLANNING  Goal: Discharge to home or other facility with appropriate resources  INTERVENTIONS:  - Identify barriers to discharge w/patient and caregiver  - Arrange for needed discharge resources and transportation as appropriate  - Identify discharge learning needs (meds, wound care, etc )  - Arrange for interpretive services to assist at discharge as needed  - Refer to Case Management Department for coordinating discharge planning if the patient needs post-hospital services based on physician/advanced practitioner order or complex needs related to functional status, cognitive ability, or social support system   Outcome: Progressing      Problem: Knowledge Deficit  Goal: Patient/family/caregiver demonstrates understanding of disease process, treatment plan, medications, and discharge instructions  Complete learning assessment and assess knowledge base    Interventions:  - Provide teaching at level of understanding  - Provide teaching via preferred learning methods   Outcome: Progressing

## 2018-07-05 NOTE — TERTIARY TRAUMA SURVEY
Progress Note - Tertiary Trauma Survery   Mónica Esquivel 77 y o  female MRN: [id number]  Unit/Bed#: [facility] 931-01 Encounter: [id number]    Summary of Diagnosed Injuries/ Clinical plan:   78 y/o female s/p fall off step ladder    Left lateral 7th, 8th, 9th and 10th rib fractures- continue current pain control regimen and rib fracture protocol  She is pulling 1750 mls on her IS  Left occult apical PTX- not present on f/u CXR (present only on CT Cervical spine)    Left MCA 5 mm aneurysm- F/u with neurosurgery (Dr Michael Schmitt) in 2 weeks  Asymptomatic at this time  Acute pain due to trauma- continue current pain regimen  History of HTN- continue home diovan    Proph- SCDs, Lovenox BID    Disp- D/C home tomorrow with family    Bedside nurse rounds completed with nurse Guy  Patient aware of plan of care for the day  Her family member was updated as well who was present in the room  Mechanism of Injury: Fall    Transfer from: [facility]  Outside Films Received: yes  Tertiary Exam Due on: 7/5/2018    Vitals: Blood pressure 159/78, pulse 77, temperature 97 6 °F (36 4 °C), temperature source Oral, resp  rate 18, weight 94 3 kg (208 lb), SpO2 97 %  ,Body mass index is 34 61 kg/m²  CT / RADIOGRAPHS: ALL RESULTS MUST BE CONFIRMED BY FACULTY OR PRINTED REPORT    Cta Head And Neck W Wo Contrast    Result Date: 7/5/2018  Impression: No acute intracranial disease  Solitary 5-6 mm left M1 termination aneurysm  Both bifurcation vessels appear to arise from this aneurysm  No flow restrictive disease within the head or neck  Findings were directly discussed with on Current Date  Workstation performed: [id number]     Xr Chest Portable    Result Date: 7/5/2018  Impression: No acute cardiopulmonary disease  Workstation performed: [id number]     Xr Chest 1 View Portable    Result Date: 7/4/2018  Impression: No acute cardiopulmonary disease   Workstation performed: [id number]     Ct Head Wo Contrast    Addendum Date: 7/4/2018    ADDENDUM: I personally discussed this study with Jasbir Rosas on 7/4/2018 at 7:36 PM      Result Date: 7/4/2018  Impression: 1  No acute intracranial abnormality  2   Possible 5 mm aneurysm off the left MCA distribution  Recommend follow-up with MRA or CTA  The study was marked in EPIC for significant notification  Workstation performed: [id number]     Ct Spine Cervical Wo Contrast    Result Date: 7/4/2018  Impression: No cervical spine fracture or traumatic malalignment  Tiny left apical pneumothorax  I personally discussed this study with Jasbir Rosas on 7/4/2018 at 7:35 PM  Workstation performed: [id number]     Ct Chest Abdomen Pelvis W Contrast    Result Date: 7/4/2018  Impression: 1  Acute fractures of the left lateral 7th, 8th, 9th and 10th ribs  2   No findings for acute intrathoracic, abdominal or pelvic injury  3  A few pulmonary nodules measuring up to 6 mm in size in the left lower lobe  Based on current Fleischner Society 2017 Guidelines on incidental pulmonary nodule, followup non-contrast CT is recommended at 3-6 months from the initial examination and, if  stable at that time, an additional followup is recommended for 18-24 months from the initial examination  The study was marked in [facility] for immediate notification  Workstation performed: [id number]     Ct Recon Only Thoracolumbar    Result Date: 7/4/2018  Impression: 1  No acute osseous abnormality  2   Minimal anterolisthesis noted of L3 on L4, may be degenerative  Multilevel degenerative changes   Workstation performed: [id number]     Consultants - List Service/ Faculty and Date: Neurosurgery    Active medications:           Current Facility-Administered Medications:     acetaminophen (TYLENOL) tablet 650 mg, 650 mg, Oral, Q6H [facility], 650 mg at 07/05/18 1109    docusate sodium (COLACE) capsule 100 mg, 100 mg, Oral, BID, 100 mg at 07/05/18 1109    enoxaparin (LOVENOX) subcutaneous injection 30 mg, 30 mg, Subcutaneous, Q12H [address], 30 mg at 07/05/18 0910    hydrochlorothiazide (HYDRODIURIL) tablet 25 mg, 25 mg, Oral, Daily, 25 mg at 07/05/18 0909    HYDROmorphone (DILAUDID) injection 0 2 mg, 0 2 mg, Intravenous, Q3H PRN    lidocaine (LIDODERM) 5 % patch 1 patch, 1 patch, Transdermal, Daily, 1 patch at 07/05/18 0150    methocarbamol (ROBAXIN) tablet 500 mg, 500 mg, Oral, Q6H PRN, 500 mg at 07/05/18 1248    ondansetron (ZOFRAN) injection 4 mg, 4 mg, Intravenous, Q6H PRN    oxyCODONE (ROXICODONE) IR tablet 2 5 mg, 2 5 mg, Oral, Q4H PRN    oxyCODONE (ROXICODONE) IR tablet 5 mg, 5 mg, Oral, Q4H PRN    senna (SENOKOT) tablet 8 6 mg, 1 tablet, Oral, HS    valsartan-hydrochlorothiazide (DIOVAN /12  5) combo dose, , Oral, Daily      Intake/Output Summary (Last 24 hours) at 07/05/18 1622  Last data filed at 07/05/18 1255   Gross per 24 hour   Intake              920 ml   Output                0 ml   Net              920 ml       Invasive Devices     Peripheral Intravenous Line            Peripheral IV 07/04/18 Right Antecubital 1 day                CAGE-AID Questionnaire:    Was the patient able to participate in the CAGE-AID screening questions on admission? Yes    Is the patient 65 years or older: YES:    1  Before the illness or injury that brought you to the Emergency, did you need someone to help you on a regular basis? 0=No   2  Since the illness or injury that brought you to the Emergency, have you needed more help than usual to take care of yourself? 0=No   3  Have you been hospitalized for one or more nights during the past 6 months (excluding a stay in the Emergency Department)? 0=No   4  In general, do you see well? 1=No   5  In general, do you have serious problems with your memory? 0=No   6  Do you take more than three different medications everyday?  0=No   TOTAL   0     Did you order a geriatric consult if the score was 2 or greater?: no    GEN: NAD  HEENT: NCAT  NEURO: GCS 15, non-focal exam  CV: RRR, no MGR  PULM: CTA bilaterally  GI: soft, non-tender, non-distended  : voiding  MSK: moving all extremities equally without contusion or deformity  SKIN: pink, warm, dry    Do NOT use the following abbreviations: DTO, gr, Lisa, MS, MSO4, MgSO4, Nitro, QD, QID, QOD, u, , ?, ?g or trailing zeros   Always use a zero before a decimal     Labs:   CBC:   Lab Results   Component Value Date    WBC 7 78 07/05/2018    HGB 11 0 (L) 07/05/2018    HCT 35 4 07/05/2018    MCV 93 07/05/2018     07/05/2018    MCH 28 9 07/05/2018    MCHC 31 1 (L) 07/05/2018    RDW 13 7 07/05/2018    MPV 9 6 07/05/2018     CMP:   Lab Results   Component Value Date     07/05/2018     07/05/2018    CO2 27 07/05/2018    ANIONGAP 7 07/05/2018    BUN 15 07/05/2018    CREATININE 0 80 07/05/2018    GLUCOSE 95 07/05/2018    CALCIUM 8 9 07/05/2018    EGFR 75 07/05/2018

## 2018-07-06 VITALS
DIASTOLIC BLOOD PRESSURE: 63 MMHG | RESPIRATION RATE: 18 BRPM | BODY MASS INDEX: 34.61 KG/M2 | WEIGHT: 208 LBS | SYSTOLIC BLOOD PRESSURE: 124 MMHG | HEART RATE: 65 BPM | OXYGEN SATURATION: 96 % | TEMPERATURE: 97.7 F

## 2018-07-06 PROCEDURE — 99238 HOSP IP/OBS DSCHRG MGMT 30/<: CPT | Performed by: PHYSICIAN ASSISTANT

## 2018-07-06 RX ORDER — METHOCARBAMOL 500 MG/1
500 TABLET, FILM COATED ORAL EVERY 6 HOURS PRN
Qty: 30 TABLET | Refills: 0 | Status: SHIPPED | OUTPATIENT
Start: 2018-07-06 | End: 2018-07-12 | Stop reason: SDUPTHER

## 2018-07-06 RX ORDER — ACETAMINOPHEN 325 MG/1
650 TABLET ORAL EVERY 6 HOURS PRN
Qty: 30 TABLET | Refills: 0 | Status: SHIPPED | OUTPATIENT
Start: 2018-07-06 | End: 2018-11-06

## 2018-07-06 RX ORDER — OXYCODONE HYDROCHLORIDE 5 MG/1
2.5 TABLET ORAL EVERY 4 HOURS PRN
Qty: 10 TABLET | Refills: 0 | Status: SHIPPED | OUTPATIENT
Start: 2018-07-06 | End: 2018-07-12

## 2018-07-06 RX ADMIN — ACETAMINOPHEN 650 MG: 325 TABLET ORAL at 11:24

## 2018-07-06 RX ADMIN — METHOCARBAMOL 500 MG: 500 TABLET ORAL at 01:54

## 2018-07-06 RX ADMIN — LIDOCAINE 1 PATCH: 50 PATCH CUTANEOUS at 00:19

## 2018-07-06 RX ADMIN — DOCUSATE SODIUM 100 MG: 100 CAPSULE, LIQUID FILLED ORAL at 09:46

## 2018-07-06 RX ADMIN — HYDROCHLOROTHIAZIDE 25 MG: 25 TABLET ORAL at 09:46

## 2018-07-06 RX ADMIN — ACETAMINOPHEN 650 MG: 325 TABLET ORAL at 05:49

## 2018-07-06 RX ADMIN — ACETAMINOPHEN 650 MG: 325 TABLET ORAL at 00:19

## 2018-07-06 RX ADMIN — METHOCARBAMOL 500 MG: 500 TABLET ORAL at 09:45

## 2018-07-06 RX ADMIN — VALSARTAN: 160 TABLET ORAL at 09:46

## 2018-07-06 NOTE — INCIDENTAL FINDINGS
The following findings require follow up:  Radiographic finding   Finding: A few pulmonary nodules measuring up to 6 mm in size in the left lower lobe     Follow up required: Yes   Follow up should be done within 2-3 week(s) I her post hospital follow-up visit with her primary care provider please discuss    Please notify the following clinician to assist with the follow up:   Primary care provider

## 2018-07-06 NOTE — DISCHARGE INSTRUCTIONS
NARCOTIC D/C instruction  Take narcotic pain medications only as prescribed  Consult with your doctor prior to starting a new medication while on narcotic pain medications  Do not drink alcohol while taking narcotic pain medications  No working, driving, or hazardous activity while taking narcotics  If you need to request a refill, please contact the office 48 hours prior to running out of your medications  [employer]  Seek medical attn if you develop worsening chest pain or shortness of breath, dizziness/lightheadness, fevers/chills or sweats  No heavy lifting, pushing or pulling >10 pounds  No strenuous physical activity  No work or driving while taking narcotic pain medications and until cleared by trauma  He may ascertain Lidoderm 4% over the counter patches    Please ascertain chest x-ray prior to her appointment    Nonruptured Cerebral Aneurysm   AMBULATORY CARE:   A nonruptured cerebral aneurysm  is a bulging or weak area in an artery that brings blood to your brain  The area fills with blood and expands  The aneurysm can expand so much that blood bursts through the artery  This is a hemorrhagic stroke  An aneurysm that has not burst can be managed or treated to prevent it from rupturing  An aneurysm can happen anywhere in your brain  They most commonly form between the brain and the base of the skull  Common signs and symptoms include the following:  Cerebral aneurysms usually have no signs or symptoms if they have not ruptured  A large nonruptured aneurysm can press on nerves and cause symptoms   The following symptoms may mean the aneurysm is at risk of rupturing:  · A headache in one area    · Pain above and behind one eye    · Dilated pupil in one eye    · Vision changes or double vision    · Numb or weak feeling in your face, or not being able to move one side of your face    · Eyelid drooping    · Nausea or vomiting  Call 911 for any of the following:   · You have any of the following signs of a stroke:      ¨ Numbness or drooping on one side of your face     ¨ Weakness in an arm or leg    ¨ Confusion or difficulty speaking    ¨ Dizziness, a severe headache, or vision loss    · You lose consciousness or have a seizure  Seek care immediately if:   · You have a stiff neck or trouble walking  · You have nausea or are vomiting  · You have blurred or double vision, or you are sensitive to light  · You suddenly feel weak  Contact your healthcare provider if:   · You have questions or concerns about your condition or care  Treatment for a nonruptured cerebral aneurysm  may include having it checked regularly by your healthcare provider  He may order tests that check the size of your aneurysm  An aneurysm that has not ruptured may not need to be treated  Treatment may be used to prevent a rupture if the aneurysm becomes large or you have symptoms  You may also need treatment if you have a family history of ruptured aneurysms  The type of treatment depends on the size and location of the aneurysm  Your healthcare provider may fill the aneurysm with coils or other devices  He may move blood flow away from the aneurysm  You may need surgery to have a small metal clip placed around the base of the aneurysm  The clip stays in place permanently to keep blood out of the aneurysm  Manage a nonruptured aneurysm:   · Control high blood pressure  Keep your blood pressure at the level your healthcare provider recommends  If you are a woman, ask your healthcare provider if birth control pills are safe for you  Birth control pills can also increase blood pressure  · Do not smoke  Nicotine can damage blood vessels and make it more difficult to manage your blood pressure  Do not use e-cigarettes or smokeless tobacco in place of cigarettes or to help you quit  They still contain nicotine   Ask your healthcare provider for information if you currently smoke and need help quitting  · Do not drink alcohol or use illegal drugs  Alcohol and illegal drugs such as cocaine can increase your blood pressure  Ask your healthcare provider for information if you need help quitting  · Eat a variety of healthy foods  Healthy foods include fruits, vegetables, whole-grain breads, beans, lean meats, fish, and low-fat dairy products  Your healthcare provider or dietitian can help you create a meal plan to help control high blood pressure or cholesterol  You may also need to limit sodium (salt)  · Exercise as directed  Exercise can help control your blood pressure and cholesterol level  Ask your healthcare provider how much exercise you need each day and which exercises are best for you  Follow up with your healthcare provider as directed: You may need ongoing tests to check the size and condition of the aneurysm over time  Write down your questions so you remember to ask them during your visits  © 2017 [employer] Information is for End User's use only and may not be sold, redistributed or otherwise used for commercial purposes  All illustrations and images included in CareNotes® are the copyrighted property of [employer]  or Trevor Valverde  The above information is an  only  It is not intended as medical advice for individual conditions or treatments  Talk to your doctor, nurse or pharmacist before following any medical regimen to see if it is safe and effective for you

## 2018-07-06 NOTE — DISCHARGE SUMMARY
Discharge Summary - Ricky Corea 77 y o  female MRN: [id number]    Unit/Bed#: [facility] 931-01 Encounter: [id number]      Summary of diagnosed injury/clinical plan  1  Left lateral 7 through 10 rib fractures   - p r n  pain management   - continue incentive spirometry currently ascertaining 17 50   - rib fracture protocol    2  Left occult apical pneumothorax   - not present on repeat chest x-ray   - will have outpatient follow-up with repeat chest x-ray   - saturation at 98%    3  Left MCA 5 mm aneurysm   - neurosurgery consulted, appreciate input   - outpatient follow-up with Dr Karmen Rosenbaum   - GCS is 15   - asymptomatic    4  History of hypertension   - continue home medications    5  DVT prophylaxis   - SCDs and Lovenox    6  PT and OT   - cleared for home    Disposition:  Discharged to home with family support  Subjective:  I am ready to go    Objective:  General:  No acute distress, well-appearing  HEENT:  normocephalic, PERRLA  Cardiac:  Regular rate and rhythm, no split S1-S2  Lungs:  CTA bilaterally, no rales or rhonchi  Abdomen: Bowel sounds x4, nontender, nondistended  Extremities:  +2 pulses on extremities, neurovascularly intact  Neuro:  Cranial nerves 2-12 intact, no focal deficits  Skin:  Warm, dry, intact    Nurse rounds completed, plan of care discussed  Patient stated that she would update/call family to come pick her up  Admission Date:   Admission Orders     Ordered        07/05/18 1618  Inpatient Admission  Once         07/04/18 1838  Place in Observation  Once               Admitting Diagnosis: Cerebral aneurysm [I67 1]  Closed fracture of multiple ribs of left side, initial encounter [S22 42XA]  Unspecified multiple injuries, initial encounter [T07  XXXA]    HPI: Sayda Butcher, "Ricky Corea is a 77 y o  female who presents with left rib pain s/p mechanical fall at home   Patient states that she was standing on a 3-4 foot step stool when she lost her balance and fell  She states that she landing on her left side onto a wrought iron table  She denies hitting her head or LOC  C/o pain to the left ribs  She denies any neck pain, headache, numbness/tingling/weakness of her extremities, vision changes, chest pain, abdominal pain, current shortness of breath, or nausea/vomiting  Patient was seen at [facility] where she had a CT abdomen pelvis with recons of the thoracolumbar spine  She was diagnosed with acute fractures of the left lateral 7th through 10th ribs  No pneumothorax was seen "  Mechanism:Fall    Procedures Performed: No orders of the defined types were placed in this encounter  Summary of Hospital Course:  Patient is a 77-year-old female presents status post fall  She was noted to have left rib pain  She is evaluated noted to have left rib fractures 7 through 10  There is a questionable occult pneumothorax seen on CT of the cervical spine  On repeat chest x-ray this was stable  Patient was started on the rib fracture protocol  Her exam remained stable and unremarkable  She was able to pass PT and OT doing cleared for home  She was discharged in stable condition with outpatient follow-up with her primary care provider  She was noted during hospital course have a 5 mm aneurysm of her MC A  She was evaluated by Neurosurgery who recommended outpatient follow-up  And she was also given a repeat chest x-ray to follow up with prior to her trauma appointment at [facility]  Significant Findings, Care, Treatment and Services Provided: Cta Head And Neck W Wo Contrast    Result Date: 7/5/2018  Impression: No acute intracranial disease  Solitary 5-6 mm left M1 termination aneurysm  Both bifurcation vessels appear to arise from this aneurysm  No flow restrictive disease within the head or neck  Findings were directly discussed with on Current Date   Workstation performed: [id number]     Xr Chest Portable    Result Date: 7/5/2018  Impression: No acute cardiopulmonary disease  Workstation performed: [id number]     Xr Chest 1 View Portable    Result Date: 7/4/2018  Impression: No acute cardiopulmonary disease  Workstation performed: [id number]     Ct Head Wo Contrast    Addendum Date: 7/4/2018    ADDENDUM: I personally discussed this study with Paul Grimes on 7/4/2018 at 7:36 PM      Result Date: 7/4/2018  Impression: 1  No acute intracranial abnormality  2   Possible 5 mm aneurysm off the left MCA distribution  Recommend follow-up with MRA or CTA  The study was marked in EPIC for significant notification  Workstation performed: [id number]     Ct Spine Cervical Wo Contrast    Result Date: 7/4/2018  Impression: No cervical spine fracture or traumatic malalignment  Tiny left apical pneumothorax  I personally discussed this study with Paul Grimes on 7/4/2018 at 7:35 PM  Workstation performed: [id number]     Ct Chest Abdomen Pelvis W Contrast    Result Date: 7/4/2018  Impression: 1  Acute fractures of the left lateral 7th, 8th, 9th and 10th ribs  2   No findings for acute intrathoracic, abdominal or pelvic injury  3  A few pulmonary nodules measuring up to 6 mm in size in the left lower lobe  Based on current Fleischner Society 2017 Guidelines on incidental pulmonary nodule, followup non-contrast CT is recommended at 3-6 months from the initial examination and, if  stable at that time, an additional followup is recommended for 18-24 months from the initial examination  The study was marked in [facility] for immediate notification  Workstation performed: [id number]     Ct Recon Only Thoracolumbar    Result Date: 7/4/2018  Impression: 1  No acute osseous abnormality  2   Minimal anterolisthesis noted of L3 on L4, may be degenerative  Multilevel degenerative changes   Workstation performed: [id number]       Complications: no complications    Discharge Diagnosis:   Patient Active Problem List   Diagnosis    Carpal tunnel syndrome on left    Accelerated essential hypertension    Dyslipidemia    Class 2 obesity due to excess calories with body mass index (BMI) of 36 0 to 36 9 in adult    Aortic stenosis, mild    Complex endometrial hyperplasia    Fatigue    Female pelvic pain    Type 2 diabetes mellitus without complication, without long-term current use of insulin (HCC)    Insomnia    Osteopenia    Primary osteoarthritis of right knee    Snoring    Tendonitis of left hip    Thickened endometrium    Asymptomatic varicose veins    Vitamin D deficiency    Closed fracture of multiple ribs of left side    Fall    Pneumothorax, left    Cerebral aneurysm         Resolved Problems  Date Reviewed: 5/11/2018    None          Condition at Discharge: good         Discharge instructions/Information to patient and family:   See after visit summary for information provided to patient and family  Provisions for Follow-Up Care:  See after visit summary for information related to follow-up care and any pertinent home health orders  PCP: Nelda Hancock MD    Disposition: Home    Planned Readmission:  Yes for possible aneurysm re-evaluation and surgery; pending outpatient follow-up  Discharge Statement   I spent 21 minutes discharging the patient  This time was spent on the day of discharge  I had direct contact with the patient on the day of discharge  Additional documentation is required if more than 30 minutes were spent on discharge  Discharge Medications:  See after visit summary for reconciled discharge medications provided to patient and family

## 2018-07-06 NOTE — NURSING NOTE
Patient Centered Rounds  Rounded with Noel from trauma  Plan for patient to discharge today, follow up appts discussed

## 2018-07-10 ENCOUNTER — TRANSITIONAL CARE MANAGEMENT (OUTPATIENT)
Dept: INTERNAL MEDICINE CLINIC | Facility: CLINIC | Age: 68
End: 2018-07-10

## 2018-07-10 ENCOUNTER — PATIENT OUTREACH (OUTPATIENT)
Dept: INTERNAL MEDICINE CLINIC | Facility: CLINIC | Age: 68
End: 2018-07-10

## 2018-07-10 NOTE — PROGRESS NOTES
pt called back and reports she is doing well  able to take deep breathes without increase pain -level 3 on scale 1-10  Yaritza Grow not using oxy for pain -taking Tylenol,robaxin and lidocaine patch   independent in [place]  daughters providing transportation until released to drive  Follow-up apt surgeon 7/17, Neuro surge 7/18 Reviewed exacerbation symptoms to watch for condition worsening and need to notify care team  Alejo Sharpe to follow-up call and has CM contact info

## 2018-07-12 ENCOUNTER — OFFICE VISIT (OUTPATIENT)
Dept: INTERNAL MEDICINE CLINIC | Facility: CLINIC | Age: 68
End: 2018-07-12
Payer: COMMERCIAL

## 2018-07-12 VITALS
BODY MASS INDEX: 36.32 KG/M2 | SYSTOLIC BLOOD PRESSURE: 120 MMHG | OXYGEN SATURATION: 98 % | DIASTOLIC BLOOD PRESSURE: 60 MMHG | HEIGHT: 65 IN | HEART RATE: 66 BPM | TEMPERATURE: 97.8 F | WEIGHT: 218 LBS

## 2018-07-12 DIAGNOSIS — J93.9 PNEUMOTHORAX, LEFT: ICD-10-CM

## 2018-07-12 DIAGNOSIS — S22.42XA CLOSED FRACTURE OF MULTIPLE RIBS OF LEFT SIDE, INITIAL ENCOUNTER: Primary | Chronic | ICD-10-CM

## 2018-07-12 DIAGNOSIS — M79.89 LEFT LEG SWELLING: ICD-10-CM
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
## 2018-07-12 DIAGNOSIS — Z12.31 VISIT FOR SCREENING MAMMOGRAM: ICD-10-CM

## 2018-07-12 DIAGNOSIS — I67.1 CEREBRAL ANEURYSM: ICD-10-CM

## 2018-07-12 PROCEDURE — 1111F DSCHRG MED/CURRENT MED MERGE: CPT | Performed by: FAMILY MEDICINE

## 2018-07-12 PROCEDURE — 99496 TRANSJ CARE MGMT HIGH F2F 7D: CPT | Performed by: FAMILY MEDICINE

## 2018-07-12 RX ORDER — METHOCARBAMOL 500 MG/1
500 TABLET, FILM COATED ORAL EVERY 6 HOURS PRN
Qty: 120 TABLET | Refills: 1 | Status: ON HOLD | OUTPATIENT
Start: 2018-07-12 | End: 2018-10-12

## 2018-07-12 RX ORDER — FUROSEMIDE 20 MG/1
20 TABLET ORAL DAILY
Qty: 60 TABLET | Refills: 1 | Status: ON HOLD | OUTPATIENT
Start: 2018-07-12 | End: 2018-10-12

## 2018-07-12 RX ORDER — METHOCARBAMOL 500 MG/1
500 TABLET, FILM COATED ORAL EVERY 6 HOURS PRN
Qty: 30 TABLET | Refills: 0 | Status: SHIPPED | OUTPATIENT
Start: 2018-07-12 | End: 2018-07-12 | Stop reason: SDUPTHER

## 2018-07-12 NOTE — PROGRESS NOTES
Assessment/Plan:     No problem-specific Assessment & Plan notes found for this encounter  Diagnoses and all orders for this visit:    Closed fracture of multiple ribs of left side, initial encounter  -     XR ribs left w pa chest min 3 views; Future  -     Discontinue: methocarbamol (ROBAXIN) 500 mg tablet; Take 1 tablet (500 mg total) by mouth every 6 (six) hours as needed for muscle spasms  -     methocarbamol (ROBAXIN) 500 mg tablet; Take 1 tablet (500 mg total) by mouth every 6 (six) hours as needed for muscle spasms    Visit for screening mammogram  -     Mammo screening bilateral w cad; Future    Pneumothorax, left    Fall, initial encounter    Cerebral aneurysm    Left leg swelling  -     VAS lower limb venous duplex study, unilateral/limited; Future  -     furosemide (LASIX) 20 mg tablet; Take 1 tablet (20 mg total) by mouth daily As needed for swelling        Reviewed her injury as well as her hospitalization with her  Reviewed the imaging that was done  She had for rib fractures and likely occult pneumothorax  Discussed with her the timing of healing  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  Discussed with her that at this point she will not be able to complete these activities at her job  FMLA form will be completed  Will recheck her in about 2-3 weeks and assess her recovery  Discussed with her that it will likely be 4-6 weeks until she can return to her full duties  Discussed with her that it may possibly be longer than that  Incentive spirometer use discussed  Watch for fevers or chills  Will get a Doppler to assess the left leg swelling  Lasix as needed for the leg swelling  Discussed with her trying not to use this more than 2-3 days a week if needed  Follow up with Trauma surgery as scheduled  Follow up with Neurosurgery as scheduled  Will have her follow up in about 2-3 weeks for recheck or sooner if needed      Subjective: Patient ID: Kristy Rice is a 77 y o  female  She presents for follow-up after recent hospitalization  She was standing on a step stool when she lost her balance and fell landing on her left side on a cast iron table  She did not hit her head and had no loss of consciousness  She landed basically on the left chest area  She was evaluated at [facility] and was diagnosed with acute fractures of the left 7th through 10th ribs with questionable a cold pneumothorax seen she did have imaging that did show 5 mm aneurysm of the MCA and she was evaluated by Neurosurgery who recommended outpatient follow-up  She continued to improve and was able to be discharged home  Since returning home she is feeling generally okay except for the pain  She requires help with most activities  Has difficulty even getting out of a chair  Cannot stand for long periods of time because of the pain  Cannot bend down because of the pain on the left side  Is not able to sleep in bed because she cannot find a comfortable position and cannot get out of the bed independently  Has been sleeping in a recliner  Has been taking the Robaxin which has helped somewhat  Has difficulty taking a deep breath because of the pain  Has been using the incentive spirometer  Denies any significant cough  Denies any fevers or chills  Has noticed some swelling in her left leg recently  She feels this may be because her legs are less active and dependent more with her sitting more frequently  Does elevate her legs at night  Denies any headache  Will be following up with Neurosurgery  Has a follow-up also set up with surgery for trauma follow-up  Review of Systems   Constitutional: Positive for activity change and fatigue  Negative for chills and fever  HENT: Negative for congestion  Respiratory: Negative for cough and shortness of breath  Cardiovascular: Positive for chest pain and leg swelling          As per HPI Gastrointestinal: Negative for abdominal pain and constipation  Endocrine: Negative for polyuria  Genitourinary: Negative for dysuria and flank pain  Musculoskeletal: Positive for neck stiffness  Neurological: Negative for light-headedness and headaches  Psychiatric/Behavioral: Positive for sleep disturbance  The following portions of the patient's history were reviewed and updated as appropriate:   She  has a past medical history of Arthritis; Carpal tunnel syndrome; Cataract; Decreased body height; Heart murmur; Hypercholesteremia; Hypertension; Motion sickness; Obesity; Periodic heart flutter; Pneumonia; PONV (postoperative nausea and vomiting); Rosacea; Vertigo; Wears glasses; and Wears partial dentures  She   Patient Active Problem List    Diagnosis Date Noted    Abnormal electromyography 2018    Numbness of hand 2018    Left leg swelling 2018    Closed fracture of multiple ribs of left side 2018    Fall 2018    Cerebral aneurysm 2018    Aortic stenosis, mild 2018    Complex endometrial hyperplasia 2017    Thickened endometrium 2017    Female pelvic pain 2017    Insomnia 2017    Primary osteoarthritis of right knee 2016    Carpal tunnel syndrome 2016    Tendonitis of left hip 2015    Vitamin D deficiency 2015    Type 2 diabetes mellitus without complication, without long-term current use of insulin [facility] 2015    Dyslipidemia 2014    Snoring 2014    Osteopenia 2014    Accelerated essential hypertension 2013    Class 2 obesity due to excess calories with body mass index (BMI) of 36 0 to 36 9 in adult 2013    Fatigue 2012    Asymptomatic varicose veins 2012     She  has a past surgical history that includes Colonoscopy;  section; pr revise median n/carpal tunnel surg (Left, 2016);  Carpal tunnel release (Bilateral); and pr laparoscopy w tot hysterectuterus <=250 gram  w tube/ovary (N/A, 8/28/2017)  Her family history includes Arthritis in her mother; Diabetes in her father; Hyperlipidemia in her sister; Hypertension in her mother; Stroke in her mother; Sudden death in her brother and father  She  reports that she has never smoked  She has never used smokeless tobacco  She reports that she does not drink alcohol or use drugs  Current Outpatient Prescriptions   Medication Sig Dispense Refill    acetaminophen (TYLENOL) 325 mg tablet Take 2 tablets (650 mg total) by mouth every 6 (six) hours as needed for mild pain 30 tablet 0    atorvastatin (LIPITOR) 20 mg tablet Take 20 mg by mouth daily at bedtime      Coenzyme Q10 (COQ-10) 100 MG CAPS Take by mouth daily      furosemide (LASIX) 20 mg tablet Take 1 tablet (20 mg total) by mouth daily As needed for swelling 60 tablet 1    methocarbamol (ROBAXIN) 500 mg tablet Take 1 tablet (500 mg total) by mouth every 6 (six) hours as needed for muscle spasms 120 tablet 1    metroNIDAZOLE (METROGEL) 0 75 % gel       Multiple Vitamin (DAILY VALUE MULTIVITAMIN) TABS Take 1 tablet by mouth daily      valsartan-hydrochlorothiazide (DIOVAN-HCT) 320-25 MG per tablet Take 1 tablet by mouth daily       No current facility-administered medications for this visit  Current Outpatient Prescriptions on File Prior to Visit   Medication Sig    acetaminophen (TYLENOL) 325 mg tablet Take 2 tablets (650 mg total) by mouth every 6 (six) hours as needed for mild pain    atorvastatin (LIPITOR) 20 mg tablet Take 20 mg by mouth daily at bedtime    Coenzyme Q10 (COQ-10) 100 MG CAPS Take by mouth daily    metroNIDAZOLE (METROGEL) 0 75 % gel     Multiple Vitamin (DAILY VALUE MULTIVITAMIN) TABS Take 1 tablet by mouth daily    valsartan-hydrochlorothiazide (DIOVAN-HCT) 320-25 MG per tablet Take 1 tablet by mouth daily     No current facility-administered medications on file prior to visit        She is allergic to penicillins       Objective:     Physical Exam   Constitutional: She is cooperative  Uncomfortable appearing and moving slow   HENT:   Head: Normocephalic and atraumatic  Eyes: Right eye exhibits no discharge  Left eye exhibits no discharge  Neck: No spinous process tenderness and no muscular tenderness present  Cardiovascular: Normal rate and regular rhythm  No murmur heard  1+ peripheral edema left leg to the mid shin without erythema or warmth; trace peripheral edema right   Pulmonary/Chest: She has decreased breath sounds  She has no wheezes  She has rhonchi  She exhibits tenderness  Not taking very deep breaths; splinting; tenderness over the lateral left rib area   Neurological: She is alert  Psychiatric: Her mood appears not anxious  She does not exhibit a depressed mood  Frustrated   Nursing note and vitals reviewed  Vitals:    07/12/18 0953   BP: 120/60   BP Location: Right arm   Patient Position: Sitting   Cuff Size: Large   Pulse: 66   Temp: 97 8 °F (36 6 °C)   TempSrc: Temporal   SpO2: 98%   Weight: 98 9 kg (218 lb)   Height: 5' 5" (1 651 m)       Cta Head And Neck W Wo Contrast    Result Date: 7/5/2018  Narrative: CTA NECK AND BRAIN WITH AND WITHOUT CONTRAST INDICATION: CEREBRAL ANEURYSM COMPARISON:   CT 7/4/2018 TECHNIQUE:  Routine CT imaging of the Brain without contrast   Post contrast imaging was performed after administration of iodinated contrast through the neck and brain  Post contrast axial 0 625 mm images timed to opacify the arterial system  3D rendering was performed on an independent workstation  MIP reconstructions performed  Coronal reconstructions were performed of the noncontrast portion of the brain  Radiation dose length product (DLP) for this visit:  1402 62 mGy-cm     This examination, like all CT scans performed in the [facility], was performed utilizing techniques to minimize radiation dose exposure, including the use of iterative reconstruction and automated exposure control  IV Contrast:  85 mL of iohexol (OMNIPAQUE)  IMAGE QUALITY:   Diagnostic FINDINGS: NONCONTRAST BRAIN PARENCHYMA:  No acute disease  There is no acute ischemia, hemorrhage, parenchymal mass or mass effect  Diffuse generalized volume loss, advanced chronic small vessel disease  Stable 5 mm hyperdensity at the junction of the lateral cerebral and sylvian fissures  Scattered vascular calcifications  VENTRICLES AND EXTRA-AXIAL SPACES:  Normal for patient's age  VISUALIZED ORBITS AND PARANASAL SINUSES:  Unremarkable  CALVARIUM AND EXTRACRANIAL SOFT TISSUES:   Normal  CERVICAL VASCULATURE AORTIC ARCH AND GREAT VESSELS:  Normal aortic arch and great vessel origins  Normal visualized subclavian vessels  RIGHT VERTEBRAL ARTERY CERVICAL SEGMENT:  Normal origin  The vessel is normal in caliber throughout the neck  LEFT VERTEBRAL ARTERY CERVICAL SEGMENT:  Normal origin  The vessel is normal in caliber throughout the neck  RIGHT EXTRACRANIAL CAROTID SEGMENT:  Normal caliber common carotid artery  Normal bifurcation and cervical internal carotid artery  No stenosis or dissection  LEFT EXTRACRANIAL CAROTID SEGMENT:  Normal caliber common carotid artery  Normal bifurcation and cervical internal carotid artery  No stenosis or dissection  NASCET criteria was used to determine the degree of internal carotid artery diameter stenosis  INTRACRANIAL VASCULATURE INTERNAL CAROTID ARTERIES:  Normal enhancement of the intracranial portions of the internal carotid arteries  Normal ophthalmic artery origins  Normal ICA terminus  ANTERIOR CIRCULATION:  Symmetric A1 segments and anterior cerebral arteries with normal enhancement  Normal anterior communicating artery  MIDDLE CEREBRAL ARTERY CIRCULATION: Right M1 and proximal branches normal  Left M1 origin is normal   There is a 5-6 mm aneurysm at the termination of the left M1 from which arises bifurcation vessels    One located the medially and the other superiorly  DISTAL VERTEBRAL ARTERIES:  Normal distal vertebral arteries  Posterior inferior cerebellar artery origins are normal  Normal vertebral basilar junction  BASILAR ARTERY:  Basilar artery is normal in caliber  Normal superior cerebellar arteries  POSTERIOR CEREBRAL ARTERIES: Both posterior cerebral arteriesdemonstrate normal enhancement  A prominent right posterior communicating artery with commensurate hypoplasia right P1 segment  Left P-comm hypoplastic  DURAL VENOUS SINUSES:  Normal  NON VASCULAR ANATOMY BONY STRUCTURES:  No acute osseous abnormality  SOFT TISSUES OF THE NECK:  Unremarkable  THORACIC INLET:  Unremarkable  Impression: No acute intracranial disease  Solitary 5-6 mm left M1 termination aneurysm  Both bifurcation vessels appear to arise from this aneurysm  No flow restrictive disease within the head or neck  Findings were directly discussed with on Current Date  Workstation performed: [id number]     Xr Chest Portable    Result Date: 7/5/2018  Narrative: CHEST INDICATION:   Acute Resp Failure  COMPARISON:  AP chest 7/4/2018  EXAM PERFORMED/VIEWS:  XR CHEST PORTABLE FINDINGS: Cardiomediastinal silhouette appears unremarkable  The lungs are clear  No pneumothorax or pleural effusion  Osseous structures appear within normal limits for patient age  Impression: No acute cardiopulmonary disease  Workstation performed: [id number]     Xr Chest 1 View Portable    Result Date: 7/4/2018  Narrative: CHEST INDICATION:   trauma  COMPARISON:  12/3/2008 EXAM PERFORMED/VIEWS:  XR CHEST PORTABLE FINDINGS: Cardiomediastinal silhouette appears unremarkable  The lungs are clear  No pneumothorax or pleural effusion  Osseous structures appear within normal limits for patient age  Impression: No acute cardiopulmonary disease   Workstation performed: [id number]     Ct Head Wo Contrast    Addendum Date: 7/4/2018 Addendum:   ADDENDUM: I personally discussed this study with Alex Bachelor on 7/4/2018 at 7:36 PM      Result Date: 7/4/2018  Narrative: CT BRAIN - WITHOUT CONTRAST INDICATION:     Fall  COMPARISON:  None  TECHNIQUE:  CT examination of the brain was performed  In addition to axial images, coronal 2D reformatted images were created and submitted for interpretation  Radiation dose length product (DLP) for this visit:  981 mGy-cm   This examination, like all CT scans performed in the [facility], was performed utilizing techniques to minimize radiation dose exposure, including the use of iterative reconstruction and automated exposure control  IMAGE QUALITY:  Diagnostic  FINDINGS: PARENCHYMA: Decreased patchy attenuation is noted in periventricular and subcortical white matter demonstrating an appearance that is statistically most likely to represent mild to moderate microangiopathic change  No CT signs of acute infarction  No intracranial mass, mass effect or midline shift  No acute parenchymal hemorrhage  VENTRICLES AND EXTRA-AXIAL SPACES:  Possible aneurysm off the left MCA distribution measuring 5 mm, image 16 series 2  VISUALIZED ORBITS AND PARANASAL SINUSES:  Unremarkable  CALVARIUM AND EXTRACRANIAL SOFT TISSUES:  Normal      Impression: 1  No acute intracranial abnormality  2   Possible 5 mm aneurysm off the left MCA distribution  Recommend follow-up with MRA or CTA  The study was marked in EPIC for significant notification  Workstation performed: [id number]     Ct Spine Cervical Wo Contrast    Result Date: 7/4/2018  Narrative: CT CERVICAL SPINE - WITHOUT CONTRAST INDICATION:   Fall  COMPARISON: None  TECHNIQUE:  CT examination of the cervical spine was performed without intravenous contrast   Contiguous axial images were obtained  Sagittal and coronal reconstructions were performed  Radiation dose length product (DLP) for this visit:  368 mGy-cm     This examination, like all CT scans performed in the [facility], was performed utilizing techniques to minimize radiation dose exposure, including the use of iterative reconstruction and automated exposure control  IMAGE QUALITY:  Diagnostic  FINDINGS: ALIGNMENT:  Normal alignment of the cervical spine  No subluxation  VERTEBRAL BODIES:  No fracture  DEGENERATIVE CHANGES:  Moderate multilevel cervical degenerative changes are noted  No critical central canal stenosis  The bones appear demineralized  PREVERTEBRAL AND PARASPINAL SOFT TISSUES:  Unremarkable  THORACIC INLET:  Tiny left apical pneumothorax noted medially, new from prior chest CT  Impression: No cervical spine fracture or traumatic malalignment  Tiny left apical pneumothorax  I personally discussed this study with Allie Baker on 7/4/2018 at 7:35 PM  Workstation performed: [id number]     Ct Chest Abdomen Pelvis W Contrast    Result Date: 7/4/2018  Narrative: CT CHEST, ABDOMEN AND PELVIS WITH IV CONTRAST INDICATION:   Trauma - left sided pain  COMPARISON: None  TECHNIQUE: CT examination of the chest, abdomen and pelvis was performed  Axial, sagittal, and coronal 2D reformatted images were created from the source data and submitted for interpretation  Radiation dose length product (DLP) for this visit:  1275 26 mGy-cm   This examination, like all CT scans performed in the [facility], was performed utilizing techniques to minimize radiation dose exposure, including the use of iterative reconstruction and automated exposure control  IV Contrast:  100 mL of iohexol (OMNIPAQUE) Enteric Contrast: Enteric contrast was not administered  FINDINGS: CHEST LUNGS:  There is a 6 mm pulmonary nodule in the left lower lobe laterally, image 46 series 3   3 mm nodule noted in the right upper lobe posteriorly, image 24 series 3  There are a few scattered tiny calcified granulomata  No pneumothorax  Scattered atelectatic changes noted in the lower lung zones  PLEURA:  Unremarkable   HEART/GREAT VESSELS: Unremarkable for patient's age  MEDIASTINUM AND BRIAN:  Unremarkable  CHEST WALL AND LOWER NECK:   Scattered coronary artery calcifications are noted  The heart is normal size  There is no pericardial effusion  Thoracic aorta is normal caliber  ABDOMEN LIVER/BILIARY TREE:  Unremarkable  GALLBLADDER:  No calcified gallstones  No pericholecystic inflammatory change  SPLEEN:  Unremarkable  PANCREAS:  Unremarkable  ADRENAL GLANDS:  Unremarkable  KIDNEYS/URETERS:  One or more sharply circumscribed subcentimeter renal hypodensities are noted  These lesions are too small to accurately characterize, but are statistically most likely to represent benign cortical renal cyst(s)  According to the guidelines published in the [facility] Paper of the ACR Incidental Findings Committee (Radiology 2010), no further workup of these lesions is recommended  STOMACH AND BOWEL:  Limited evaluation without enteric contrast   There is scattered colonic diverticulosis  APPENDIX:  No findings to suggest appendicitis  ABDOMINOPELVIC CAVITY:  No ascites or free intraperitoneal air  No lymphadenopathy  VESSELS:  Unremarkable for patient's age  PELVIS REPRODUCTIVE ORGANS:  Unremarkable for patient's age  URINARY BLADDER:  Underdistended limiting evaluation  ABDOMINAL WALL/INGUINAL REGIONS:  Unremarkable  OSSEOUS STRUCTURES:  Acute fractures are noted of the left lateral 7th, 8th, 9th and 10th ribs  These demonstrate variable displacement  Impression: 1  Acute fractures of the left lateral 7th, 8th, 9th and 10th ribs  2   No findings for acute intrathoracic, abdominal or pelvic injury  3  A few pulmonary nodules measuring up to 6 mm in size in the left lower lobe  Based on current Fleischner Society 2017 Guidelines on incidental pulmonary nodule, followup non-contrast CT is recommended at 3-6 months from the initial examination and, if  stable at that time, an additional followup is recommended for 18-24 months from the initial examination   The study was marked in [facility] for immediate notification  Workstation performed: [id number]     Ct Recon Only Thoracolumbar    Result Date: 7/4/2018  Narrative: CT THORACIC AND LUMBAR SPINE INDICATION:   Trauma  COMPARISON:  None TECHNIQUE: Axial CT examination of the thoracic and lumbar spine was obtained utilizing reconstructed images from CT of the chest abdomen and pelvis performed the same day  Images were reformatted in the sagittal and coronal planes  This examination, like all CT scans performed in the [facility], was performed utilizing techniques to minimize radiation dose exposure, including the use of iterative reconstruction and automated exposure control  FINDINGS: ALIGNMENT: Minimal anterolisthesis noted of L3 on L4 may be degenerative  VERTEBRAL BODIES: No fracture  DEGENERATIVE CHANGES: Moderate multilevel degenerative disc disease  Vacuum disc phenomena noted at L4-L5 and L5-S1  Extensive facet arthritic changes in the lower lumbosacral spine  Mild to moderate central canal stenosis at L5-S1 secondary to degenerative changes  PREVERTEBRAL AND PARASPINAL SOFT TISSUES: Normal      Impression: 1  No acute osseous abnormality  2   Minimal anterolisthesis noted of L3 on L4, may be degenerative  Multilevel degenerative changes   Workstation performed: [id number]       Admission on 07/04/2018, Discharged on 07/06/2018   Component Date Value Ref Range Status    Sodium 07/05/2018 139  136 - 145 mmol/L Final    Potassium 07/05/2018 3 5  3 5 - 5 3 mmol/L Final    Chloride 07/05/2018 105  100 - 108 mmol/L Final    CO2 07/05/2018 27  21 - 32 mmol/L Final    Anion Gap 07/05/2018 7  4 - 13 mmol/L Final    BUN 07/05/2018 15  5 - 25 mg/dL Final    Creatinine 07/05/2018 0 80  0 60 - 1 30 mg/dL Final    Standardized to IDMS reference method    Glucose 07/05/2018 95  65 - 140 mg/dL Final      If the patient is fasting, the ADA then defines impaired fasting glucose as > 100 mg/dL and diabetes as > or equal to 123 mg/dL  Specimen collection should occur prior to Sulfasalazine administration due to the potential for falsely depressed results  Specimen collection should occur prior to Sulfapyridine administration due to the potential for falsely elevated results      Calcium 07/05/2018 8 9  8 3 - 10 1 mg/dL Final    eGFR 07/05/2018 75  ml/min/1 73sq m Final    WBC 07/05/2018 7 78  4 31 - 10 16 Thousand/uL Final    RBC 07/05/2018 3 80* 3 81 - 5 12 Million/uL Final    Hemoglobin 07/05/2018 11 0* 11 5 - 15 4 g/dL Final    Hematocrit 07/05/2018 35 4  34 8 - 46 1 % Final    MCV 07/05/2018 93  82 - 98 fL Final    MCH 07/05/2018 28 9  26 8 - 34 3 pg Final    MCHC 07/05/2018 31 1* 31 4 - 37 4 g/dL Final    RDW 07/05/2018 13 7  11 6 - 15 1 % Final    Platelets 34/25/1149 235  149 - 390 Thousands/uL Final    MPV 07/05/2018 9 6  8 9 - 12 7 fL Final   Admission on 07/04/2018, Discharged on 07/04/2018   Component Date Value Ref Range Status    WBC 07/04/2018 9 01  4 31 - 10 16 Thousand/uL Final    RBC 07/04/2018 4 32  3 81 - 5 12 Million/uL Final    Hemoglobin 07/04/2018 13 0  11 5 - 15 4 g/dL Final    Hematocrit 07/04/2018 39 2  34 8 - 46 1 % Final    MCV 07/04/2018 91  82 - 98 fL Final    MCH 07/04/2018 30 1  26 8 - 34 3 pg Final    MCHC 07/04/2018 33 2  31 4 - 37 4 g/dL Final    RDW 07/04/2018 13 5  11 6 - 15 1 % Final    MPV 07/04/2018 9 5  8 9 - 12 7 fL Final    Platelets 15/10/4877 291  149 - 390 Thousands/uL Final    Protime 07/04/2018 13 3  11 8 - 14 2 seconds Final    INR 07/04/2018 1 06  0 86 - 1 17 Final    PTT 07/04/2018 31  24 - 36 seconds Final    Therapeutic Heparin Range =  60-90 seconds    ABO Grouping 07/04/2018 O   Final    Rh Factor 07/04/2018 Positive   Final    Antibody Screen 07/04/2018 Negative   Final    Specimen Expiration Date 07/04/2018 [id number]   Final    Sodium 07/04/2018 144  136 - 145 mmol/L Final    Potassium 07/04/2018 3 8  3 5 - 5 3 mmol/L Final    Chloride 07/04/2018 107  100 - 108 mmol/L Final    CO2 07/04/2018 26  21 - 32 mmol/L Final    Anion Gap 07/04/2018 11  4 - 13 mmol/L Final    BUN 07/04/2018 18  5 - 25 mg/dL Final    Creatinine 07/04/2018 0 98  0 60 - 1 30 mg/dL Final    Standardized to IDMS reference method    Glucose 07/04/2018 114  65 - 140 mg/dL Final      If the patient is fasting, the ADA then defines impaired fasting glucose as > 100 mg/dL and diabetes as > or equal to 123 mg/dL  Specimen collection should occur prior to Sulfasalazine administration due to the potential for falsely depressed results  Specimen collection should occur prior to Sulfapyridine administration due to the potential for falsely elevated results   Calcium 07/04/2018 9 5  8 3 - 10 1 mg/dL Final    AST 07/04/2018 18  5 - 45 U/L Final      Specimen collection should occur prior to Sulfasalazine administration due to the potential for falsely depressed results   ALT 07/04/2018 22  12 - 78 U/L Final      Specimen collection should occur prior to Sulfasalazine administration due to the potential for falsely depressed results   Alkaline Phosphatase 07/04/2018 117* 46 - 116 U/L Final    Total Protein 07/04/2018 7 7  6 4 - 8 2 g/dL Final    Albumin 07/04/2018 3 4* 3 5 - 5 0 g/dL Final    Total Bilirubin 07/04/2018 0 20  0 20 - 1 00 mg/dL Final    eGFR 07/04/2018 59  ml/min/1 73sq m Final    LACTIC ACID 07/04/2018 1 3  0 5 - 2 0 mmol/L Final    Troponin I 07/04/2018 <0 02  <=0 04 ng/mL Final    3Autovalidation override  [employer] Chemistry analyzer 99% cutoff is > 0 04 ng/mL in network labs     o cTnI 99% cutoff is useful only when applied to patients in the clinical setting of myocardial ischemia   o cTnI 99% cutoff should be interpreted in the context of clinical history, ECG findings and possibly cardiac imaging to establish correct diagnosis     o cTnI 99% cutoff may be suggestive but clearly not indicative of a coronary event without the clinical setting of myocardial ischemia   Phosphorus 07/04/2018 2 9  2 3 - 4 1 mg/dL Final    Magnesium 07/04/2018 2 0  1 6 - 2 6 mg/dL Final    Segmented % 07/04/2018 80* 43 - 75 % Final    Lymphocytes % 07/04/2018 17  14 - 44 % Final    Monocytes % 07/04/2018 2* 4 - 12 % Final    Eosinophils % 07/04/2018 1  0 - 6 % Final    Basophils % 07/04/2018 0  0 - 1 % Final    Absolute Neutrophils 07/04/2018 7 21  1 85 - 7 62 Thousand/uL Final    Lymphocytes Absolute 07/04/2018 1 53  0 60 - 4 47 Thousand/uL Final    Monocytes Absolute 07/04/2018 0 18  0 00 - 1 22 Thousand/uL Final    Eosinophils Absolute 07/04/2018 0 09  0 00 - 0 40 Thousand/uL Final    Basophils Absolute 07/04/2018 0 00  0 00 - 0 10 Thousand/uL Final    Total Counted 07/04/2018 100   Final    Platelet Estimate 41/19/7534 Adequate  Adequate Final    Ventricular Rate 07/04/2018 79  BPM Final    Atrial Rate 07/04/2018 79  BPM Final    MT Interval 07/04/2018 192  ms Final    QRSD Interval 07/04/2018 106  ms Final    QT Interval 07/04/2018 392  ms Final    QTC Interval 07/04/2018 449  ms Final    P Axis 07/04/2018 58  degrees Final    QRS Axis 07/04/2018 65  degrees Final    T Wave [place] 07/04/2018 42  degrees Final       Transitional Care Management Review:  Fernando Crigler is a 77 y o  female here for TCM follow up       During the TCM phone call patient stated:    Date and time hospital follow up call was made:  7/10/2018  9:08 AM  Hospital care reviewed:  Records reviewed  Patient was hopsitalized at:  [facility]  Date of admission:  7/4/18  Date of discharge:  7/6/18  Diagnosis:  closed fracture of multiple ribs left side  Disposition:  Home  Were the patients medicaitons reviewed and updated:  Yes  Post hospital issues:  Reduced activity, Poor ADL (Activities of Daily Living) performance  Should patient be enrolled in anticoag monitoring?:  No  Scheduled for follow up?:  Yes  Patients specialists:  Other (comment)  Other specialists Name:  Dr Felisa Jaffe  Other specialists contact #:  Neurosurgeon  Did you obtain your prescribed medications:  Yes  Do you need help managing your perscriptions or medications:  No  Is transportation to your appointments needed:  No  I have advised the patient to call PCP with any new or worsening symptoms (please type in name along with any credentials):  Cheryl Reyna  Living Arrangements:  Alone  Support System:  Family  The type of support provided:  None  Do you have social support:  Yes, some  Are you recieving outpatient services:  No  Are you recieving home care services:  No  Are you using any community resources:  No  Current waiver service:  No  Have you fallen in the last 12 months:  Yes  How many times:  once 7/4/18  Counseling:  Patient  Counseling topics:  Activities of daily living  Comments:  Patient remarked she is sore on her left side             Nena Wang MD

## 2018-07-14 ENCOUNTER — HOSPITAL ENCOUNTER (OUTPATIENT)
Dept: RADIOLOGY | Facility: HOSPITAL | Age: 68
Discharge: HOME | End: 2018-07-14
Payer: COMMERCIAL

## 2018-07-14 DIAGNOSIS — S22.42XA CLOSED FRACTURE OF MULTIPLE RIBS OF LEFT SIDE, INITIAL ENCOUNTER: Chronic | ICD-10-CM

## 2018-07-14 PROCEDURE — 71101 X-RAY EXAM UNILAT RIBS/CHEST: CPT

## 2018-07-17 ENCOUNTER — HOSPITAL ENCOUNTER (OUTPATIENT)
Dept: ULTRASOUND IMAGING | Facility: HOSPITAL | Age: 68
Discharge: HOME | End: 2018-07-17
Payer: COMMERCIAL

## 2018-07-17 ENCOUNTER — OFFICE VISIT (OUTPATIENT)
Dept: SURGERY | Facility: HOSPITAL | Age: 68
End: 2018-07-17
Payer: COMMERCIAL

## 2018-07-17 VITALS
DIASTOLIC BLOOD PRESSURE: 74 MMHG | SYSTOLIC BLOOD PRESSURE: 128 MMHG | BODY MASS INDEX: 36.15 KG/M2 | TEMPERATURE: 98.1 F | HEART RATE: 83 BPM | WEIGHT: 217 LBS | HEIGHT: 65 IN

## 2018-07-17 DIAGNOSIS — M79.89 LEFT LEG SWELLING: ICD-10-CM

## 2018-07-17 DIAGNOSIS — S22.42XA CLOSED FRACTURE OF MULTIPLE RIBS OF LEFT SIDE, INITIAL ENCOUNTER: Primary | Chronic | ICD-10-CM

## 2018-07-17 PROBLEM — R20.0 NUMBNESS OF HAND: Status: ACTIVE | Noted: 2018-07-17

## 2018-07-17 PROBLEM — R94.131 ABNORMAL ELECTROMYOGRAPHY: Status: ACTIVE | Noted: 2018-07-17

## 2018-07-17 PROBLEM — J93.9 PNEUMOTHORAX, LEFT: Status: RESOLVED | Noted: 2018-07-04 | Resolved: 2018-07-17

## 2018-07-17 PROCEDURE — 99213 OFFICE O/P EST LOW 20 MIN: CPT | Performed by: SURGERY

## 2018-07-17 PROCEDURE — 93971 EXTREMITY STUDY: CPT

## 2018-07-17 NOTE — PROGRESS NOTES
Pt here for follow up trauma, fell and broke ribs, pt feeling much better still have some pain though,

## 2018-07-17 NOTE — ASSESSMENT & PLAN NOTE
Doing well  No shortness of breath  Minimal tenderness on exam   X-ray reviewed showing no pneumothorax, displaced ribs on the left with a mild reaction       - continue with incentive spirometry daily  - pain control  - increased activity as tolerated  - follow-up 1 month

## 2018-07-17 NOTE — PROGRESS NOTES
Assessment/Plan:    Pneumothorax, left  Repeat x-ray showing no pneumothorax  Patient is asymptomatic  Closed fracture of multiple ribs of left side  Doing well  No shortness of breath  Minimal tenderness on exam   X-ray reviewed showing no pneumothorax, displaced ribs on the left with a mild reaction       - continue with incentive spirometry daily  - pain control  - increased activity as tolerated  - follow-up 1 month       Diagnoses and all orders for this visit:    Closed fracture of multiple ribs of left side, initial encounter          Subjective:      Patient ID: Abelardo Thornton is a 77 y o  female  77-year-old female status post mechanical fall with multiple left-sided displaced rib fractures, presents today for follow-up  Overall doing well  No nausea vomiting  No chest pain or shortness of breath  Does have some left-sided pain especially when lying down  Denies any abdominal pain  States her breathing is greatly improved she continues to do the incentive spirometer  The following portions of the patient's history were reviewed and updated as appropriate:   She  has a past medical history of Arthritis; Carpal tunnel syndrome; Cataract; Decreased body height; Heart murmur; Hypercholesteremia; Hypertension; Motion sickness; Obesity; Periodic heart flutter; Pneumonia; PONV (postoperative nausea and vomiting); Rosacea; Vertigo; Wears glasses; and Wears partial dentures    She   Patient Active Problem List    Diagnosis Date Noted    Abnormal electromyography 07/17/2018    Numbness of hand 07/17/2018    Left leg swelling 07/12/2018    Closed fracture of multiple ribs of left side 07/04/2018    Fall 07/04/2018    Cerebral aneurysm 07/04/2018    Aortic stenosis, mild 03/19/2018    Complex endometrial hyperplasia 06/23/2017    Thickened endometrium 06/01/2017    Female pelvic pain 04/03/2017    Insomnia 03/07/2017    Primary osteoarthritis of right knee 05/31/2016    Carpal tunnel syndrome 2016    Tendonitis of left hip 2015    Vitamin D deficiency 2015    Type 2 diabetes mellitus without complication, without long-term current use [facility] 2015    Dyslipidemia 2014    Snoring 2014    Osteopenia 2014    Accelerated essential hypertension 2013    Class 2 obesity due to excess calories with body mass index (BMI) of 36 0 to 36 9 in adult 2013    Fatigue 2012    Asymptomatic varicose veins 2012     She  has a past surgical history that includes Colonoscopy;  section; pr revise median n/carpal tunnel surg (Left, 2016); Carpal tunnel release (Bilateral); and pr laparoscopy w tot hysterectuterus <=250 gram  w tube/ovary (N/A, 2017)  Her family history includes Arthritis in her mother; Diabetes in her father; Hyperlipidemia in her sister; Hypertension in her mother; Stroke in her mother; Sudden death in her brother and father  She  reports that she has never smoked  She has never used smokeless tobacco  She reports that she does not drink alcohol or use drugs    Current Outpatient Prescriptions   Medication Sig Dispense Refill    acetaminophen (TYLENOL) 325 mg tablet Take 2 tablets (650 mg total) by mouth every 6 (six) hours as needed for mild pain 30 tablet 0    atorvastatin (LIPITOR) 20 mg tablet Take 20 mg by mouth daily at bedtime      Coenzyme Q10 (COQ-10) 100 MG CAPS Take by mouth daily      furosemide (LASIX) 20 mg tablet Take 1 tablet (20 mg total) by mouth daily As needed for swelling 60 tablet 1    methocarbamol (ROBAXIN) 500 mg tablet Take 1 tablet (500 mg total) by mouth every 6 (six) hours as needed for muscle spasms 120 tablet 1    metroNIDAZOLE (METROGEL) 0 75 % gel       Multiple Vitamin (DAILY VALUE MULTIVITAMIN) TABS Take 1 tablet by mouth daily      valsartan-hydrochlorothiazide (DIOVAN-HCT) 320-25 MG per tablet Take 1 tablet by mouth daily       No current facility-administered medications for this visit  She is allergic to penicillins       Review of Systems   Constitutional: Negative  Respiratory: Negative for cough, shortness of breath, wheezing and stridor  Noted left-sided rib pain   Cardiovascular: Negative for chest pain  Gastrointestinal: Negative for abdominal distention and abdominal pain  Musculoskeletal: Negative for gait problem  Skin: Negative for color change, pallor, rash and wound  Objective:      /74   Pulse 83   Temp 98 1 °F (36 7 °C)   Ht 5' 5" (1 651 m)   Wt 98 4 kg (217 lb)   BMI 36 11 kg/m²          Physical Exam   Constitutional: She is oriented to person, place, and time  She appears well-developed and well-nourished  No distress  HENT:   Head: Normocephalic and atraumatic  Eyes: No scleral icterus  Neck: Normal range of motion  No tracheal deviation present  Cardiovascular: Normal rate  Pulmonary/Chest: Effort normal and breath sounds normal  No respiratory distress  She has no wheezes  She has no rales  She exhibits no tenderness  Mild tender palpation along the left rib cage  Abdominal: Soft  She exhibits no distension and no mass  There is no tenderness  There is no rebound and no guarding  Musculoskeletal: Normal range of motion  She exhibits no tenderness or deformity  Neurological: She is alert and oriented to person, place, and time  No cranial nerve deficit  Skin: Skin is warm  No rash noted  She is not diaphoretic  No erythema  No pallor  Psychiatric: She has a normal mood and affect  Her behavior is normal    Vitals reviewed

## 2018-07-18 ENCOUNTER — OFFICE VISIT (OUTPATIENT)
Dept: NEUROSURGERY | Facility: CLINIC | Age: 68
End: 2018-07-18
Payer: COMMERCIAL

## 2018-07-18 VITALS
HEART RATE: 71 BPM | RESPIRATION RATE: 16 BRPM | WEIGHT: 215.4 LBS | SYSTOLIC BLOOD PRESSURE: 140 MMHG | DIASTOLIC BLOOD PRESSURE: 70 MMHG | BODY MASS INDEX: 35.89 KG/M2 | TEMPERATURE: 97.7 F | HEIGHT: 65 IN

## 2018-07-18 DIAGNOSIS — I67.1 CEREBRAL ANEURYSM: Primary | ICD-10-CM

## 2018-07-18 PROCEDURE — 99214 OFFICE O/P EST MOD 30 MIN: CPT | Performed by: NEUROLOGICAL SURGERY

## 2018-07-18 PROCEDURE — 93971 EXTREMITY STUDY: CPT | Performed by: SURGERY

## 2018-07-18 NOTE — PROGRESS NOTES
Patient Id: Tatyana Fernandez is a 77 y o  female        Assessment/Plan:    Diagnoses and all orders for this visit:    Cerebral aneurysm  -     CTA head w wo contrast; Future        Discussion Summary: This is a 20-year-old female with an incidentally discovered 5 x 6 mm left broad necked MCA aneurysm incorporating her distal M2s  We discussed the diagnosis of intracranial aneurysm as well as subarachnoid hemorrhage  We discussed the risks associated with aneurysmal rupture based on size and location  We discussed that her risk for rupture varies from 0-2% over 5 years byISIUA standard were approximately half to 1% per year by other studies  We discussed the inherent biases within the studies as well  We discussed the risks and benefits associated with a diagnostic cerebral arteriogram including puncture site hematoma, vessel injury, and stroke  We discussed how this may better delineate her treatment options and the angio architecture of the lesion  I have recommended this procedure  Given her general health and high level of function the possibility of treatment is worth discussing  In addition we discussed the possibility of continued conservative management and observation  She is currently unsure of which direction she would like to proceed in  As such I have obtained consent for formal diagnostic arteriogram as well as ordering a CTA for 1 year from now  She will call the office should she change her mind and need for more aggressive management  We discussed possible treatment methods including endovascular and open surgical options  We discussed symptoms associated with subarachnoid hemorrhage and reasons for which she would need to call 911 immediately  Both she and her daughter asked appropriate questions which were answered  Of a side note, she expressed significant dissatisfaction and stress with her current job    She finds that her 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 causes are significant amount of stress and anxiety  We discussed the importance of reducing the stress in her life  In addition we discussed the importance of blood pressure control, cholesterol control, and healthy living  I spent 60 minutes with the patient greater than 50 percent of which was spent in counseling  Chief Complaint: Follow-up (2 week hospital follow up with no studies)        HPI:   This is a very pleasant 23-year-old female who presented to [facility] as a trauma after falling from a step ladder and landing on her left side  She suffered multiple rib fractures  Head CT was performed as well as a TCA  This revealed an incidental left MCA aneurysm measuring 5 x 6 mm  For this reason she was referred to Neurosurgery for evaluation  She denies any stroke-like symptoms  She denies any sudden onset headaches  She denies any knowledge of this aneurysm previously  Her review of systems otherwise negative  Her past medical history significant for hypertension, hyperlipidemia, and leg swelling  Her past surgical history significant for hysterectomy and bilateral carpal tunnel surgery  She has no family history of subarachnoid hemorrhage or aneurysm  She has no family history of sudden death  She is  for 51 years  She has 5 children ages 37-63  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  She denies any history of tobacco or alcohol  She denies any history of drug use  She is allergic to penicillin  She currently takes acetaminophen, atorvastatin, Lasix, Robaxin, valsartan, and hydrochlorothiazide  Review of Systems   Constitutional: Negative  HENT: Negative  Eyes: Negative  Respiratory: Positive for shortness of breath (from broken ribs)  Negative for apnea, cough, choking, chest tightness, wheezing and stridor  Cardiovascular: Negative  Gastrointestinal: Negative  Endocrine: Negative  Genitourinary: Negative  Musculoskeletal: Negative  Skin: Negative  Allergic/Immunologic: Negative  Neurological: Negative  Hematological: Negative  Psychiatric/Behavioral: Negative  Physical Exam  Vitals:    07/18/18 1445   BP: 140/70   Pulse: 71   Resp: 16   Temp: 97 7 °F (36 5 °C)    She is well appearing and in no acute distress  She is mildly obese with a BMI of 36  Her affect is appropriate  She is intermittently tearful during this conversation  Her pupils are equal round reactive to light  Her extraocular movements are intact  Her face is symmetric  Tongue is midline  Facial sensation intact and symmetric throughout  Her vision is grossly intact as well as her sense of hearing  Her shoulder shrug is 5/5  She has no drift  She has no dysmetria  She has full strength in her bilateral upper and lower extremities  She has normal muscle tone and muscle bulk  She has some abrasions along her skin especially her left side and left lower leg  There is some bruising here  Her abdomen is soft and nondistended  Her radial pulses are 2+ and symmetric  Her heart sounds are regular  Her her breath sounds are clear  She is able to ambulate without difficulty      The following portions of the patient's history were reviewed and updated as appropriate: allergies, current medications, past family history, past medical history, past social history, past surgical history and problem list     Active Ambulatory Problems     Diagnosis Date Noted    Carpal tunnel syndrome 04/08/2016    Accelerated essential hypertension 12/06/2013    Dyslipidemia 07/09/2014    Class 2 obesity due to excess calories with body mass index (BMI) of 36 0 to 36 9 in adult 02/05/2013    Aortic stenosis, mild 03/19/2018    Complex endometrial hyperplasia 06/23/2017    Fatigue 12/14/2012    Female pelvic pain 04/03/2017    Type 2 diabetes mellitus without complication, without long-term current use of insulin ([facility] ) 2015    Insomnia 2017    Osteopenia 2014    Primary osteoarthritis of right knee 2016    Snoring 2014    Tendonitis of left hip 2015    Thickened endometrium 2017    Asymptomatic varicose veins 2012    Vitamin D deficiency 2015    Closed fracture of multiple ribs of left side 2018    Fall 2018    Cerebral aneurysm 2018    Left leg swelling 2018    Abnormal electromyography 2018    Numbness of hand 2018     Resolved Ambulatory Problems     Diagnosis Date Noted    Systolic ejection murmur     Pneumothorax, left 2018     Past Medical History:   Diagnosis Date    Arthritis     Carpal tunnel syndrome     Cataract     Decreased body height     Heart murmur     Hypercholesteremia     Hypertension     Motion sickness     Obesity     Periodic heart flutter     Pneumonia     PONV (postoperative nausea and vomiting)     Rosacea     Vertigo     Wears glasses     Wears partial dentures        Past Surgical History:   Procedure Laterality Date    CARPAL TUNNEL RELEASE Bilateral      SECTION      x1    COLONOSCOPY      [place] LAPAROSCOPY W TOT HYSTERECTUTERUS <=250 GRAM  W TUBE/OVARY N/A 2017    Procedure: HYSTERECTOMY LAPAROSCOPIC TOTAL, BSO, Cystoscopy;  Surgeon: Delmy Capellan MD;  Location: [facility];  Service: Gynecology    NJ REVISE MEDIAN N/CARPAL TUNNEL SURG Left 2016    Procedure: RELEASE CARPAL TUNNEL;  Surgeon: Chandra Dietrich MD;  Location: [facility];  Service: Orthopedics         (Not in a hospital admission)    Results/Data: We reviewed together both a report of her imaging as well as the actual images  These reveal a left MCA aneurysm with a broad neck involving bilateral M2 use measuring 5 x 6 mm

## 2018-07-26 ENCOUNTER — OFFICE VISIT (OUTPATIENT)
Dept: INTERNAL MEDICINE CLINIC | Facility: CLINIC | Age: 68
End: 2018-07-26
Payer: COMMERCIAL

## 2018-07-26 VITALS
HEIGHT: 65 IN | HEART RATE: 65 BPM | BODY MASS INDEX: 36.15 KG/M2 | TEMPERATURE: 97.2 F | WEIGHT: 217 LBS | OXYGEN SATURATION: 97 % | SYSTOLIC BLOOD PRESSURE: 100 MMHG | DIASTOLIC BLOOD PRESSURE: 60 MMHG

## 2018-07-26 DIAGNOSIS — R53.83 FATIGUE, UNSPECIFIED TYPE: ICD-10-CM

## 2018-07-26 DIAGNOSIS — S22.42XD CLOSED FRACTURE OF MULTIPLE RIBS OF LEFT SIDE WITH ROUTINE HEALING: Primary | ICD-10-CM

## 2018-07-26 PROCEDURE — 99213 OFFICE O/P EST LOW 20 MIN: CPT | Performed by: FAMILY MEDICINE

## 2018-07-26 NOTE — PROGRESS NOTES
Assessment/Plan:    Closed fracture of multiple ribs of left side with routine healing  She has been healing relatively well  She continues to have pain  Advised her to avoid repetitive bending or lifting especially above her head since this seems to cause pain  Continue with incentive spirometry and deep breathing exercises  Watch for any increased pain or shortness of breath  Since she is very active at her job, she cannot return to her fall duties at present  Hopefully within the next 3-4 weeks she will have improved enough to return to full duties  Fatigue  Still with fatigue likely because she cannot sleep in of bed because of the pain  Gradually try to return to sleeping in a bed  Diagnoses and all orders for this visit:    Closed fracture of multiple ribs of left side with routine healing    Fatigue, unspecified type          Subjective:      Patient ID: Beth Dumont is a 77 y o  female  She presents for follow-up  Pain has improved somewhat  She is better able to take a deep breath  Continues to use the incentive spirometer  Is more able to move her arms especially her left arm without as much pain  Still cannot bend over easily because of the pain  Still cannot sleep in bed and is either sleeping in a recliner or on the couch which gives her better support  Has been tired but she feels that is because she is not sleeping quite as well  Denies any palpitations  Denies any abdominal pain  Denies any significant shortness of breath  The following portions of the patient's history were reviewed and updated as appropriate:   She  has a past medical history of Arthritis; Carpal tunnel syndrome; Cataract; Decreased body height; Heart murmur; Hypercholesteremia; Hypertension; Motion sickness; Obesity; Periodic heart flutter; Pneumonia; PONV (postoperative nausea and vomiting); Rosacea; Vertigo; Wears glasses; and Wears partial dentures    She   Patient Active Problem List Diagnosis Date Noted    Abnormal electromyography 2018    Numbness of hand 2018    Left leg swelling 2018    Closed fracture of multiple ribs of left side with routine healing 2018    Fall 2018    Cerebral aneurysm 2018    Aortic stenosis, mild 2018    Complex endometrial hyperplasia 2017    Thickened endometrium 2017    Female pelvic pain 2017    Insomnia 2017    Primary osteoarthritis of right knee 2016    Carpal tunnel syndrome 2016    Tendonitis of left hip 2015    Vitamin D deficiency 2015    Type 2 diabetes mellitus without complication, without long-term current use of insulin ([facility] [address] ) 2015    Dyslipidemia 2014    Snoring 2014    Osteopenia 2014    Accelerated essential hypertension 2013    Class 2 obesity due to excess calories with body mass index (BMI) of 36 0 to 36 9 in adult 2013    Fatigue 2012    Asymptomatic varicose veins 2012     She  has a past surgical history that includes Colonoscopy;  section; pr revise median n/carpal tunnel surg (Left, 2016); Carpal tunnel release (Bilateral); and pr laparoscopy w tot hysterectuterus <=250 gram  w tube/ovary (N/A, 2017)  Her family history includes Arthritis in her mother; Diabetes in her father; Hyperlipidemia in her sister; Hypertension in her mother; Stroke in her mother; Sudden death in her brother and father  She  reports that she has never smoked  She has never used smokeless tobacco  She reports that she does not drink alcohol or use drugs    Current Outpatient Prescriptions   Medication Sig Dispense Refill    acetaminophen (TYLENOL) 325 mg tablet Take 2 tablets (650 mg total) by mouth every 6 (six) hours as needed for mild pain 30 tablet 0    atorvastatin (LIPITOR) 20 mg tablet Take 20 mg by mouth daily at bedtime      Coenzyme Q10 (COQ-10) 100 MG CAPS Take by mouth daily  furosemide (LASIX) 20 mg tablet Take 1 tablet (20 mg total) by mouth daily As needed for swelling 60 tablet 1    methocarbamol (ROBAXIN) 500 mg tablet Take 1 tablet (500 mg total) by mouth every 6 (six) hours as needed for muscle spasms 120 tablet 1    metroNIDAZOLE (METROGEL) 0 75 % gel       Multiple Vitamin (DAILY VALUE MULTIVITAMIN) TABS Take 1 tablet by mouth daily      valsartan-hydrochlorothiazide (DIOVAN-HCT) 320-25 MG per tablet Take 1 tablet by mouth daily       No current facility-administered medications for this visit  Current Outpatient Prescriptions on File Prior to Visit   Medication Sig    acetaminophen (TYLENOL) 325 mg tablet Take 2 tablets (650 mg total) by mouth every 6 (six) hours as needed for mild pain    atorvastatin (LIPITOR) 20 mg tablet Take 20 mg by mouth daily at bedtime    Coenzyme Q10 (COQ-10) 100 MG CAPS Take by mouth daily    furosemide (LASIX) 20 mg tablet Take 1 tablet (20 mg total) by mouth daily As needed for swelling    methocarbamol (ROBAXIN) 500 mg tablet Take 1 tablet (500 mg total) by mouth every 6 (six) hours as needed for muscle spasms    metroNIDAZOLE (METROGEL) 0 75 % gel     Multiple Vitamin (DAILY VALUE MULTIVITAMIN) TABS Take 1 tablet by mouth daily    valsartan-hydrochlorothiazide (DIOVAN-HCT) 320-25 MG per tablet Take 1 tablet by mouth daily     No current facility-administered medications on file prior to visit  She is allergic to penicillins       Review of Systems   Constitutional: Positive for activity change and fatigue  Negative for appetite change, chills and fever  HENT: Negative for congestion and rhinorrhea  Respiratory: Negative for chest tightness and shortness of breath  Cardiovascular: Negative for palpitations  As per HPI   Gastrointestinal: Negative for abdominal pain, diarrhea, nausea and vomiting  Neurological: Negative for dizziness and headaches  Hematological: Does not bruise/bleed easily  Psychiatric/Behavioral: Positive for sleep disturbance  Objective:      /60 (BP Location: Left arm, Patient Position: Sitting, Cuff Size: Large)   Pulse 65   Temp (!) 97 2 °F (36 2 °C) (Temporal)   Ht 5' 5" (1 651 m)   Wt 98 4 kg (217 lb)   SpO2 97%   BMI 36 11 kg/m²          Physical Exam   Constitutional: She is oriented to person, place, and time  She is cooperative  No distress  HENT:   Head: Normocephalic and atraumatic  Mouth/Throat: Oropharynx is clear and moist    Eyes: Conjunctivae are normal  Pupils are equal, round, and reactive to light  Right eye exhibits no discharge  Left eye exhibits no discharge  Cardiovascular: Normal rate, regular rhythm and normal heart sounds  Exam reveals no gallop and no friction rub  No murmur heard  Pulmonary/Chest: No respiratory distress  She has no wheezes  She has no rales  Tenderness over the left lateral rib area; still not taking very deep breaths   Abdominal: Bowel sounds are normal    Musculoskeletal: She exhibits no edema  Lymphadenopathy:     She has no cervical adenopathy  Neurological: She is alert and oriented to person, place, and time  Skin: Skin is warm and dry  Psychiatric: She has a normal mood and affect  Her behavior is normal    Nursing note and vitals reviewed

## 2018-08-06 PROBLEM — S22.42XD CLOSED FRACTURE OF MULTIPLE RIBS OF LEFT SIDE WITH ROUTINE HEALING: Status: ACTIVE | Noted: 2018-07-04

## 2018-08-07 NOTE — ASSESSMENT & PLAN NOTE
She has been healing relatively well  She continues to have pain  Advised her to avoid repetitive bending or lifting especially above her head since this seems to cause pain  Continue with incentive spirometry and deep breathing exercises  Watch for any increased pain or shortness of breath  Since she is very active at her job, she cannot return to her fall duties at present  Hopefully within the next 3-4 weeks she will have improved enough to return to full duties

## 2018-08-07 NOTE — ASSESSMENT & PLAN NOTE
Still with fatigue likely because she cannot sleep in of bed because of the pain  Gradually try to return to sleeping in a bed

## 2018-08-13 ENCOUNTER — APPOINTMENT (OUTPATIENT)
Dept: LAB | Facility: HOSPITAL | Age: 68
End: 2018-08-13
Payer: COMMERCIAL

## 2018-08-13 DIAGNOSIS — E78.5 DYSLIPIDEMIA: ICD-10-CM

## 2018-08-13 DIAGNOSIS — E11.9 TYPE 2 DIABETES MELLITUS WITHOUT COMPLICATION, WITHOUT LONG-TERM CURRENT USE OF INSULIN (HCC): ICD-10-CM

## 2018-08-13 DIAGNOSIS — I10 ACCELERATED ESSENTIAL HYPERTENSION: ICD-10-CM

## 2018-08-13 LAB
ALBUMIN SERPL BCP-MCNC: 3.3 G/DL (ref 3.5–5)
ALP SERPL-CCNC: 127 U/L (ref 46–116)
ALT SERPL W P-5'-P-CCNC: 19 U/L (ref 12–78)
ANION GAP SERPL CALCULATED.3IONS-SCNC: 9 MMOL/L (ref 4–13)
AST SERPL W P-5'-P-CCNC: 14 U/L (ref 5–45)
BASOPHILS # BLD AUTO: 0.04 THOUSANDS/ΜL (ref 0–0.1)
BASOPHILS NFR BLD AUTO: 1 % (ref 0–1)
BILIRUB SERPL-MCNC: 0.3 MG/DL (ref 0.2–1)
BUN SERPL-MCNC: 18 MG/DL (ref 5–25)
CALCIUM SERPL-MCNC: 9.2 MG/DL (ref 8.3–10.1)
CHLORIDE SERPL-SCNC: 104 MMOL/L (ref 100–108)
CHOLEST SERPL-MCNC: 207 MG/DL (ref 50–200)
CO2 SERPL-SCNC: 29 MMOL/L (ref 21–32)
CREAT SERPL-MCNC: 0.99 MG/DL (ref 0.6–1.3)
EOSINOPHIL # BLD AUTO: 0.27 THOUSAND/ΜL (ref 0–0.61)
EOSINOPHIL NFR BLD AUTO: 4 % (ref 0–6)
ERYTHROCYTE [DISTWIDTH] IN BLOOD BY AUTOMATED COUNT: 13.6 % (ref 11.6–15.1)
EST. AVERAGE GLUCOSE BLD GHB EST-MCNC: 131 MG/DL
GFR SERPL CREATININE-BSD FRML MDRD: 58 ML/MIN/1.73SQ M
GLUCOSE P FAST SERPL-MCNC: 110 MG/DL (ref 65–99)
HBA1C MFR BLD: 6.2 % (ref 4.2–6.3)
HCT VFR BLD AUTO: 38 % (ref 34.8–46.1)
HDLC SERPL-MCNC: 63 MG/DL (ref 40–60)
HGB BLD-MCNC: 12.6 G/DL (ref 11.5–15.4)
LDLC SERPL CALC-MCNC: 121 MG/DL (ref 0–100)
LYMPHOCYTES # BLD AUTO: 1.58 THOUSANDS/ΜL (ref 0.6–4.47)
LYMPHOCYTES NFR BLD AUTO: 25 % (ref 14–44)
MCH RBC QN AUTO: 30 PG (ref 26.8–34.3)
MCHC RBC AUTO-ENTMCNC: 33.2 G/DL (ref 31.4–37.4)
MCV RBC AUTO: 91 FL (ref 82–98)
MONOCYTES # BLD AUTO: 0.39 THOUSAND/ΜL (ref 0.17–1.22)
MONOCYTES NFR BLD AUTO: 6 % (ref 4–12)
NEUTROPHILS # BLD AUTO: 4.12 THOUSANDS/ΜL (ref 1.85–7.62)
NEUTS SEG NFR BLD AUTO: 64 % (ref 43–75)
NONHDLC SERPL-MCNC: 144 MG/DL
PLATELET # BLD AUTO: 268 THOUSANDS/UL (ref 149–390)
PMV BLD AUTO: 9.5 FL (ref 8.9–12.7)
POTASSIUM SERPL-SCNC: 4 MMOL/L (ref 3.5–5.3)
PROT SERPL-MCNC: 7.1 G/DL (ref 6.4–8.2)
RBC # BLD AUTO: 4.2 MILLION/UL (ref 3.81–5.12)
SODIUM SERPL-SCNC: 142 MMOL/L (ref 136–145)
TRIGL SERPL-MCNC: 116 MG/DL
TSH SERPL DL<=0.05 MIU/L-ACNC: 3.69 UIU/ML (ref 0.36–3.74)
WBC # BLD AUTO: 6.4 THOUSAND/UL (ref 4.31–10.16)

## 2018-08-13 PROCEDURE — 36415 COLL VENOUS BLD VENIPUNCTURE: CPT

## 2018-08-13 PROCEDURE — 85025 COMPLETE CBC W/AUTO DIFF WBC: CPT

## 2018-08-13 PROCEDURE — 84443 ASSAY THYROID STIM HORMONE: CPT

## 2018-08-13 PROCEDURE — 83036 HEMOGLOBIN GLYCOSYLATED A1C: CPT

## 2018-08-13 PROCEDURE — 80053 COMPREHEN METABOLIC PANEL: CPT

## 2018-08-13 PROCEDURE — 80061 LIPID PANEL: CPT

## 2018-09-06 ENCOUNTER — OFFICE VISIT (OUTPATIENT)
Dept: INTERNAL MEDICINE CLINIC | Facility: CLINIC | Age: 68
End: 2018-09-06
Payer: COMMERCIAL

## 2018-09-06 VITALS
WEIGHT: 217 LBS | HEART RATE: 97 BPM | TEMPERATURE: 99.7 F | BODY MASS INDEX: 36.15 KG/M2 | SYSTOLIC BLOOD PRESSURE: 152 MMHG | DIASTOLIC BLOOD PRESSURE: 70 MMHG | HEIGHT: 65 IN | OXYGEN SATURATION: 98 %

## 2018-09-06 DIAGNOSIS — S22.42XD CLOSED FRACTURE OF MULTIPLE RIBS OF LEFT SIDE WITH ROUTINE HEALING: Primary | ICD-10-CM

## 2018-09-06 DIAGNOSIS — R61 HYPERHIDROSIS: ICD-10-CM

## 2018-09-06 PROBLEM — F43.21 GRIEVING: Status: ACTIVE | Noted: 2018-09-06

## 2018-09-06 PROCEDURE — 3008F BODY MASS INDEX DOCD: CPT | Performed by: FAMILY MEDICINE

## 2018-09-06 PROCEDURE — 99213 OFFICE O/P EST LOW 20 MIN: CPT | Performed by: FAMILY MEDICINE

## 2018-09-06 NOTE — LETTER
September 6, 2018     Patient: Ben Conrad   YOB: 1950   Date of Visit: 9/6/2018       To Whom it May Concern:    Rosa Hardy is under my professional care  She was seen in my office on 9/6/2018  She may return to work with limitations on 9/12/18  She may not lift over 20 pounds for the next 4 weeks  If you have any questions or concerns, please don't hesitate to call           Sincerely,          Dorian Moran MD        CC: No Recipients

## 2018-09-06 NOTE — PROGRESS NOTES
Assessment/Plan:    No problem-specific Assessment & Plan notes found for this encounter  Diagnoses and all orders for this visit:    Closed fracture of multiple ribs of left side with routine healing  -     XR ribs left w pa chest min 3 views; Future    Hyperhidrosis        She was given release to return to work  Avoid heavy lifting for now  Gradually resume normal activities fully  Follow-up with Neurosurgery as per their recommendation for further investigation of the abnormalities found during her hospitalization  Watch salt intake  Follow blood pressure at home  Discussed with her the importance of good blood pressure control with the abnormalities found on scan of her brain  Watch for any persistence or worsening of the increased sweating  Hopefully this will improve with the colder weather  Will have her follow up in about 2 months as previously scheduled or sooner if needed  Subjective:      Patient ID: Brandon Harrison is a 77 y o  female  She presents for follow-up and for release for work  Has generally been doing better  Still has some slight pain over the left rib area but has improved significantly  Is mostly back to her regular activities  Still is careful about leaning on the left rib area and still has difficulty sleeping on that side  Some pain with bending but has improved significantly  Denies any significant shortness of breath  Will be following with Neurosurgery and having further investigation of the vascular abnormalities in the brain found during her hospitalization for the rib fractures  Will most likely be going through an angiogram in the future for further investigation and possibly treatment  Denies any headaches or localized weakness  She feels that her blood pressure is mildly elevated today because she just took her blood pressure medicine immediately prior to the visit    She has noticed some increased sweating which she feels is related to her medications  This has been a chronic issue for her but has been worse the summer  The following portions of the patient's history were reviewed and updated as appropriate:   She  has a past medical history of Arthritis; Carpal tunnel syndrome; Cataract; Decreased body height; Heart murmur; Hypercholesteremia; Hypertension; Motion sickness; Obesity; Periodic heart flutter; Pneumonia; PONV (postoperative nausea and vomiting); Rosacea; Vertigo; Wears glasses; and Wears partial dentures  She   Patient Active Problem List    Diagnosis Date Noted    Hyperhidrosis 2018    Grieving 2018    Abnormal electromyography 2018    Numbness of hand 2018    Left leg swelling 2018    Closed fracture of multiple ribs of left side with routine healing 2018    Fall 2018    Cerebral aneurysm 2018    Aortic stenosis, mild 2018    Complex endometrial hyperplasia 2017    Thickened endometrium 2017    Female pelvic pain 2017    Insomnia 2017    Primary osteoarthritis of right knee 2016    Carpal tunnel syndrome 2016    Tendonitis of left hip 2015    Vitamin D deficiency 2015    Type 2 diabetes mellitus without complication, without long-term current use of insulin ([facility] [address] ) 2015    Dyslipidemia 2014    Snoring 2014    Osteopenia 2014    Accelerated essential hypertension 2013    Class 2 obesity due to excess calories with body mass index (BMI) of 36 0 to 36 9 in adult 2013    Fatigue 2012    Asymptomatic varicose veins 2012     She  has a past surgical history that includes Colonoscopy;  section; pr revise median n/carpal tunnel surg (Left, 2016); Carpal tunnel release (Bilateral); and pr laparoscopy w tot hysterectuterus <=250 gram  w tube/ovary (N/A, 2017)    Her family history includes Arthritis in her mother; Diabetes in her father; Hyperlipidemia in her sister; Hypertension in her mother; Stroke in her mother; Sudden death in her brother and father  She  reports that she has never smoked  She has never used smokeless tobacco  She reports that she does not drink alcohol or use drugs  Current Outpatient Prescriptions   Medication Sig Dispense Refill    acetaminophen (TYLENOL) 325 mg tablet Take 2 tablets (650 mg total) by mouth every 6 (six) hours as needed for mild pain 30 tablet 0    atorvastatin (LIPITOR) 20 mg tablet Take 20 mg by mouth daily at bedtime      Coenzyme Q10 (COQ-10) 100 MG CAPS Take by mouth daily      furosemide (LASIX) 20 mg tablet Take 1 tablet (20 mg total) by mouth daily As needed for swelling 60 tablet 1    methocarbamol (ROBAXIN) 500 mg tablet Take 1 tablet (500 mg total) by mouth every 6 (six) hours as needed for muscle spasms 120 tablet 1    metroNIDAZOLE (METROGEL) 0 75 % gel       Multiple Vitamin (DAILY VALUE MULTIVITAMIN) TABS Take 1 tablet by mouth daily      valsartan-hydrochlorothiazide (DIOVAN-HCT) 320-25 MG per tablet Take 1 tablet by mouth daily       No current facility-administered medications for this visit        Current Outpatient Prescriptions on File Prior to Visit   Medication Sig    acetaminophen (TYLENOL) 325 mg tablet Take 2 tablets (650 mg total) by mouth every 6 (six) hours as needed for mild pain    atorvastatin (LIPITOR) 20 mg tablet Take 20 mg by mouth daily at bedtime    Coenzyme Q10 (COQ-10) 100 MG CAPS Take by mouth daily    furosemide (LASIX) 20 mg tablet Take 1 tablet (20 mg total) by mouth daily As needed for swelling    methocarbamol (ROBAXIN) 500 mg tablet Take 1 tablet (500 mg total) by mouth every 6 (six) hours as needed for muscle spasms    metroNIDAZOLE (METROGEL) 0 75 % gel     Multiple Vitamin (DAILY VALUE MULTIVITAMIN) TABS Take 1 tablet by mouth daily    valsartan-hydrochlorothiazide (DIOVAN-HCT) 320-25 MG per tablet Take 1 tablet by mouth daily     No current facility-administered medications on file prior to visit  She is allergic to penicillins       Review of Systems   Constitutional: Negative for activity change, chills and fatigue  Respiratory: Negative for cough  Cardiovascular:        As per HPI   Musculoskeletal: Positive for arthralgias  Skin:        Has noticed persistent sweating easily  Neurological: Negative for headaches  Objective:      /70 (BP Location: Left arm, Patient Position: Sitting, Cuff Size: Large)   Pulse 97   Temp 99 7 °F (37 6 °C) (Temporal)   Ht 5' 5" (1 651 m)   Wt 98 4 kg (217 lb)   SpO2 98%   BMI 36 11 kg/m²          Physical Exam   Constitutional: She is cooperative  Cardiovascular: Normal rate and regular rhythm  Pulmonary/Chest: Effort normal  She has no decreased breath sounds  She has no wheezes  She has no rhonchi  Very slight tenderness over the left lateral rib area   Neurological: She is alert  Psychiatric: She has a normal mood and affect  Nursing note and vitals reviewed

## 2018-09-10 ENCOUNTER — HOSPITAL ENCOUNTER (OUTPATIENT)
Dept: RADIOLOGY | Facility: HOSPITAL | Age: 68
Discharge: HOME | End: 2018-09-10
Payer: COMMERCIAL

## 2018-09-10 DIAGNOSIS — S22.42XD CLOSED FRACTURE OF MULTIPLE RIBS OF LEFT SIDE WITH ROUTINE HEALING: ICD-10-CM

## 2018-09-10 PROCEDURE — 71101 X-RAY EXAM UNILAT RIBS/CHEST: CPT

## 2018-09-18 DIAGNOSIS — I67.1 CEREBRAL ANEURYSM, NONRUPTURED: Primary | ICD-10-CM

## 2018-09-18 NOTE — PROGRESS NOTES
Placed order for angiogram at the request of IR  per Dr Garcia Ellis patient is to be scheduled for this procedure

## 2018-10-05 ENCOUNTER — APPOINTMENT (OUTPATIENT)
Dept: LAB | Facility: HOSPITAL | Age: 68
End: 2018-10-05
Attending: NEUROLOGICAL SURGERY
Payer: COMMERCIAL

## 2018-10-05 DIAGNOSIS — I67.1 CEREBRAL ANEURYSM, NONRUPTURED: ICD-10-CM

## 2018-10-05 LAB
ANION GAP SERPL CALCULATED.3IONS-SCNC: 11 MMOL/L (ref 4–13)
APTT PPP: 35 SECONDS (ref 24–36)
BASOPHILS # BLD AUTO: 0.03 THOUSANDS/ΜL (ref 0–0.1)
BASOPHILS NFR BLD AUTO: 0 % (ref 0–1)
BUN SERPL-MCNC: 14 MG/DL (ref 5–25)
CALCIUM SERPL-MCNC: 9.5 MG/DL (ref 8.3–10.1)
CHLORIDE SERPL-SCNC: 102 MMOL/L (ref 100–108)
CO2 SERPL-SCNC: 25 MMOL/L (ref 21–32)
CREAT SERPL-MCNC: 0.97 MG/DL (ref 0.6–1.3)
EOSINOPHIL # BLD AUTO: 0.13 THOUSAND/ΜL (ref 0–0.61)
EOSINOPHIL NFR BLD AUTO: 2 % (ref 0–6)
ERYTHROCYTE [DISTWIDTH] IN BLOOD BY AUTOMATED COUNT: 13.3 % (ref 11.6–15.1)
GFR SERPL CREATININE-BSD FRML MDRD: 59 ML/MIN/1.73SQ M
GLUCOSE P FAST SERPL-MCNC: 101 MG/DL (ref 65–99)
HCT VFR BLD AUTO: 38.2 % (ref 34.8–46.1)
HGB BLD-MCNC: 12.5 G/DL (ref 11.5–15.4)
IMM GRANULOCYTES # BLD AUTO: 0.02 THOUSAND/UL (ref 0–0.2)
IMM GRANULOCYTES NFR BLD AUTO: 0 % (ref 0–2)
INR PPP: 1.04 (ref 0.86–1.17)
LYMPHOCYTES # BLD AUTO: 1.53 THOUSANDS/ΜL (ref 0.6–4.47)
LYMPHOCYTES NFR BLD AUTO: 19 % (ref 14–44)
MCH RBC QN AUTO: 29.4 PG (ref 26.8–34.3)
MCHC RBC AUTO-ENTMCNC: 32.7 G/DL (ref 31.4–37.4)
MCV RBC AUTO: 90 FL (ref 82–98)
MONOCYTES # BLD AUTO: 0.54 THOUSAND/ΜL (ref 0.17–1.22)
MONOCYTES NFR BLD AUTO: 7 % (ref 4–12)
NEUTROPHILS # BLD AUTO: 5.66 THOUSANDS/ΜL (ref 1.85–7.62)
NEUTS SEG NFR BLD AUTO: 72 % (ref 43–75)
NRBC BLD AUTO-RTO: 0 /100 WBCS
PLATELET # BLD AUTO: 272 THOUSANDS/UL (ref 149–390)
PMV BLD AUTO: 9.6 FL (ref 8.9–12.7)
POTASSIUM SERPL-SCNC: 3.8 MMOL/L (ref 3.5–5.3)
PROTHROMBIN TIME: 13.1 SECONDS (ref 11.8–14.2)
RBC # BLD AUTO: 4.25 MILLION/UL (ref 3.81–5.12)
SODIUM SERPL-SCNC: 138 MMOL/L (ref 136–145)
WBC # BLD AUTO: 7.91 THOUSAND/UL (ref 4.31–10.16)

## 2018-10-05 PROCEDURE — 36415 COLL VENOUS BLD VENIPUNCTURE: CPT

## 2018-10-05 PROCEDURE — 85610 PROTHROMBIN TIME: CPT

## 2018-10-05 PROCEDURE — 85025 COMPLETE CBC W/AUTO DIFF WBC: CPT

## 2018-10-05 PROCEDURE — 85730 THROMBOPLASTIN TIME PARTIAL: CPT

## 2018-10-05 PROCEDURE — 80048 BASIC METABOLIC PNL TOTAL CA: CPT

## 2018-10-09 ENCOUNTER — TELEPHONE (OUTPATIENT)
Dept: RADIOLOGY | Facility: HOSPITAL | Age: 68
End: 2018-10-09

## 2018-10-09 RX ORDER — SODIUM CHLORIDE 9 MG/ML
75 INJECTION, SOLUTION INTRAVENOUS CONTINUOUS
Status: CANCELLED | OUTPATIENT
Start: 2018-10-09

## 2018-10-12 ENCOUNTER — ANESTHESIA (OUTPATIENT)
Dept: SURGERY | Facility: HOSPITAL | Age: 68
End: 2018-10-12
Payer: COMMERCIAL

## 2018-10-12 ENCOUNTER — ANESTHESIA EVENT (OUTPATIENT)
Dept: SURGERY | Facility: HOSPITAL | Age: 68
End: 2018-10-12
Payer: COMMERCIAL

## 2018-10-12 ENCOUNTER — HOSPITAL ENCOUNTER (OUTPATIENT)
Dept: RADIOLOGY | Facility: HOSPITAL | Age: 68
Discharge: HOME | End: 2018-10-12
Attending: NEUROLOGICAL SURGERY | Admitting: NEUROLOGICAL SURGERY
Payer: COMMERCIAL

## 2018-10-12 VITALS
HEART RATE: 60 BPM | BODY MASS INDEX: 35.65 KG/M2 | SYSTOLIC BLOOD PRESSURE: 152 MMHG | TEMPERATURE: 97.7 F | DIASTOLIC BLOOD PRESSURE: 84 MMHG | WEIGHT: 214 LBS | HEIGHT: 65 IN | OXYGEN SATURATION: 92 % | RESPIRATION RATE: 20 BRPM

## 2018-10-12 DIAGNOSIS — I67.1 CEREBRAL ANEURYSM, NONRUPTURED: ICD-10-CM

## 2018-10-12 PROCEDURE — C1769 GUIDE WIRE: HCPCS

## 2018-10-12 PROCEDURE — 36226 PLACE CATH VERTEBRAL ART: CPT

## 2018-10-12 PROCEDURE — 36224 PLACE CATH CAROTD ART: CPT | Performed by: NEUROLOGICAL SURGERY

## 2018-10-12 PROCEDURE — 36226 PLACE CATH VERTEBRAL ART: CPT | Performed by: NEUROLOGICAL SURGERY

## 2018-10-12 PROCEDURE — C1894 INTRO/SHEATH, NON-LASER: HCPCS

## 2018-10-12 PROCEDURE — 36224 PLACE CATH CAROTD ART: CPT

## 2018-10-12 PROCEDURE — C1760 CLOSURE DEV, VASC: HCPCS

## 2018-10-12 RX ORDER — ONDANSETRON 2 MG/ML
4 INJECTION INTRAMUSCULAR; INTRAVENOUS ONCE
Status: DISCONTINUED | OUTPATIENT
Start: 2018-10-12 | End: 2018-10-12 | Stop reason: HOSPADM

## 2018-10-12 RX ORDER — FENTANYL CITRATE/PF 50 MCG/ML
25 SYRINGE (ML) INJECTION
Status: DISCONTINUED | OUTPATIENT
Start: 2018-10-12 | End: 2018-10-12 | Stop reason: HOSPADM

## 2018-10-12 RX ORDER — DIPHENHYDRAMINE HYDROCHLORIDE 50 MG/ML
INJECTION INTRAMUSCULAR; INTRAVENOUS AS NEEDED
Status: DISCONTINUED | OUTPATIENT
Start: 2018-10-12 | End: 2018-10-12 | Stop reason: SURG

## 2018-10-12 RX ORDER — PROPOFOL 10 MG/ML
INJECTION, EMULSION INTRAVENOUS CONTINUOUS PRN
Status: DISCONTINUED | OUTPATIENT
Start: 2018-10-12 | End: 2018-10-12 | Stop reason: SURG

## 2018-10-12 RX ORDER — ONDANSETRON 2 MG/ML
INJECTION INTRAMUSCULAR; INTRAVENOUS AS NEEDED
Status: DISCONTINUED | OUTPATIENT
Start: 2018-10-12 | End: 2018-10-12 | Stop reason: SURG

## 2018-10-12 RX ORDER — HYDROCODONE BITARTRATE AND ACETAMINOPHEN 5; 325 MG/1; MG/1
1 TABLET ORAL EVERY 6 HOURS PRN
Status: DISCONTINUED | OUTPATIENT
Start: 2018-10-12 | End: 2018-10-12 | Stop reason: HOSPADM

## 2018-10-12 RX ORDER — SODIUM CHLORIDE 9 MG/ML
125 INJECTION, SOLUTION INTRAVENOUS CONTINUOUS
Status: DISCONTINUED | OUTPATIENT
Start: 2018-10-12 | End: 2018-10-12 | Stop reason: HOSPADM

## 2018-10-12 RX ORDER — SCOLOPAMINE TRANSDERMAL SYSTEM 1 MG/1
1 PATCH, EXTENDED RELEASE TRANSDERMAL ONCE
Status: COMPLETED | OUTPATIENT
Start: 2018-10-12 | End: 2018-10-12

## 2018-10-12 RX ORDER — METOCLOPRAMIDE HYDROCHLORIDE 5 MG/ML
INJECTION INTRAMUSCULAR; INTRAVENOUS AS NEEDED
Status: DISCONTINUED | OUTPATIENT
Start: 2018-10-12 | End: 2018-10-12 | Stop reason: SURG

## 2018-10-12 RX ORDER — SODIUM CHLORIDE 9 MG/ML
75 INJECTION, SOLUTION INTRAVENOUS CONTINUOUS
Status: DISCONTINUED | OUTPATIENT
Start: 2018-10-12 | End: 2018-10-12 | Stop reason: HOSPADM

## 2018-10-12 RX ORDER — LIDOCAINE HYDROCHLORIDE 10 MG/ML
INJECTION, SOLUTION INFILTRATION; PERINEURAL AS NEEDED
Status: DISCONTINUED | OUTPATIENT
Start: 2018-10-12 | End: 2018-10-12 | Stop reason: SURG

## 2018-10-12 RX ORDER — PROPOFOL 10 MG/ML
INJECTION, EMULSION INTRAVENOUS AS NEEDED
Status: DISCONTINUED | OUTPATIENT
Start: 2018-10-12 | End: 2018-10-12 | Stop reason: SURG

## 2018-10-12 RX ADMIN — DEXAMETHASONE SODIUM PHOSPHATE 10 MG: 10 INJECTION INTRAMUSCULAR; INTRAVENOUS at 08:25

## 2018-10-12 RX ADMIN — SCOPALAMINE 1 PATCH: 1 PATCH, EXTENDED RELEASE TRANSDERMAL at 08:25

## 2018-10-12 RX ADMIN — ONDANSETRON 4 MG: 2 INJECTION INTRAMUSCULAR; INTRAVENOUS at 08:25

## 2018-10-12 RX ADMIN — SODIUM CHLORIDE: 0.9 INJECTION, SOLUTION INTRAVENOUS at 08:11

## 2018-10-12 RX ADMIN — METOCLOPRAMIDE 10 MG: 5 INJECTION, SOLUTION INTRAMUSCULAR; INTRAVENOUS at 08:25

## 2018-10-12 RX ADMIN — DIPHENHYDRAMINE HYDROCHLORIDE 12.5 MG: 50 INJECTION, SOLUTION INTRAMUSCULAR; INTRAVENOUS at 08:25

## 2018-10-12 RX ADMIN — LIDOCAINE HYDROCHLORIDE 100 MG: 10 INJECTION, SOLUTION INFILTRATION; PERINEURAL at 08:22

## 2018-10-12 RX ADMIN — PROPOFOL 40 MCG/KG/MIN: 10 INJECTION, EMULSION INTRAVENOUS at 08:22

## 2018-10-12 RX ADMIN — PROPOFOL 30 MG: 10 INJECTION, EMULSION INTRAVENOUS at 09:00

## 2018-10-12 RX ADMIN — IODIXANOL 106 ML: 320 INJECTION, SOLUTION INTRAVASCULAR at 09:15

## 2018-10-12 RX ADMIN — PROPOFOL 30 MG: 10 INJECTION, EMULSION INTRAVENOUS at 08:22

## 2018-10-12 NOTE — H&P
H and P from clinic note, 7/18/2018 is current and updated as needed  Assessment/Plan:     Diagnoses and all orders for this visit:     Cerebral aneurysm  -     CTA head w wo contrast; Future           Discussion Summary: This is a 23-year-old female with an incidentally discovered 5 x 6 mm left broad necked MCA aneurysm incorporating her distal M2s  We discussed the diagnosis of intracranial aneurysm as well as subarachnoid hemorrhage  We discussed the risks associated with aneurysmal rupture based on size and location  We discussed that her risk for rupture varies from 0-2% over 5 years byISIUA standard were approximately half to 1% per year by other studies  We discussed the inherent biases within the studies as well      Plan for angio today          Chief Complaint: Follow-up (2 week hospital follow up with no studies)           HPI:   This is a very pleasant 23-year-old female who presented to [facility] as a trauma after falling from a step ladder and landing on her left side  She suffered multiple rib fractures  Head CT was performed as well as a TCA  This revealed an incidental left MCA aneurysm measuring 5 x 6 mm  For this reason she was referred to Neurosurgery for evaluation  She denies any stroke-like symptoms  She denies any sudden onset headaches  She denies any knowledge of this aneurysm previously      Her review of systems otherwise negative      Her past medical history significant for hypertension, hyperlipidemia, and leg swelling  Her past surgical history significant for hysterectomy and bilateral carpal tunnel surgery      She has no family history of subarachnoid hemorrhage or aneurysm  She has no family history of sudden death      She is  for 46 years  She has 5 children ages 37-63  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  She denies any history of tobacco or alcohol    She denies any history of drug use      She is allergic to penicillin  She currently takes acetaminophen, atorvastatin, Lasix, Robaxin, valsartan, and hydrochlorothiazide      Review of Systems   Constitutional: Negative  HENT: Negative  Eyes: Negative  Respiratory: Positive for shortness of breath (from broken ribs)  Negative for apnea, cough, choking, chest tightness, wheezing and stridor  Cardiovascular: Negative  Gastrointestinal: Negative  Endocrine: Negative  Genitourinary: Negative  Musculoskeletal: Negative  Skin: Negative  Allergic/Immunologic: Negative  Neurological: Negative  Hematological: Negative  Psychiatric/Behavioral: Negative              Physical Exam  /69 (BP Location: Left arm)   Pulse 61   Temp 97 5 °F (36 4 °C) (Oral)   Resp 18   Ht 5' 5" (1 651 m)   Wt 97 1 kg (214 lb)   SpO2 98%   BMI 35 61 kg/m²    She is well appearing and in no acute distress  She is mildly obese with a BMI of 36  Her affect is appropriate  She is intermittently tearful during this conversation  Her pupils are equal round reactive to light  Her extraocular movements are intact  Her face is symmetric  Tongue is midline  Facial sensation intact and symmetric throughout  Her vision is grossly intact as well as her sense of hearing  Her shoulder shrug is 5/5  She has no drift  She has no dysmetria  She has full strength in her bilateral upper and lower extremities  She has normal muscle tone and muscle bulk  She has some abrasions along her skin especially her left side and left lower leg  There is some bruising here  Her abdomen is soft and nondistended  Her radial pulses are 2+ and symmetric  Her heart sounds are regular  Her her breath sounds are clear    She is able to ambulate without difficulty      The following portions of the patient's history were reviewed and updated as appropriate: allergies, current medications, past family history, past medical history, past social history, past surgical history and problem list           Active Ambulatory Problems     Diagnosis Date Noted    Carpal tunnel syndrome 2016    Accelerated essential hypertension 2013    Dyslipidemia 2014    Class 2 obesity due to excess calories with body mass index (BMI) of 36 0 to 36 9 in adult 2013    Aortic stenosis, mild 2018    Complex endometrial hyperplasia 2017    Fatigue 2012    Female pelvic pain 2017    Type 2 diabetes mellitus without complication, without long-term current use of insulin ([facility]) 2015    Insomnia 2017    Osteopenia 2014    Primary osteoarthritis of right knee 2016    Snoring 2014    Tendonitis of left hip 2015    Thickened endometrium 2017    Asymptomatic varicose veins 2012    Vitamin D deficiency 2015    Closed fracture of multiple ribs of left side 2018    Fall 2018    Cerebral aneurysm 2018    Left leg swelling 2018    Abnormal electromyography 2018    Numbness of hand 2018           Resolved Ambulatory Problems     Diagnosis Date Noted    Systolic ejection murmur     Pneumothorax, left 2018           Past Medical History:   Diagnosis Date    Arthritis      Carpal tunnel syndrome      Cataract      Decreased body height      Heart murmur      Hypercholesteremia      Hypertension      Motion sickness      Obesity      Periodic heart flutter      Pneumonia      PONV (postoperative nausea and vomiting)      Rosacea      Vertigo      Wears glasses      Wears partial dentures           Surgical History         Past Surgical History:   Procedure Laterality Date    CARPAL TUNNEL RELEASE Bilateral       SECTION         x1    COLONOSCOPY        UT LAPAROSCOPY W TOT HYSTERECTUTERUS <=250 GRAM  W TUBE/OVARY N/A 2017     Procedure: HYSTERECTOMY LAPAROSCOPIC TOTAL, BSO, Cystoscopy;  Surgeon: Jeanine Varma MD; Location: [facility];  Service: Gynecology    LA REVISE MEDIAN N/CARPAL TUNNEL SURG Left 4/8/2016     Procedure: RELEASE CARPAL TUNNEL;  Surgeon: Jennifer Vickers MD;  Location: [facility];  Service: [address] [place] [address]      (Not in a hospital admission)        Results/Data: We reviewed together both a report of her imaging as well as the actual images  These reveal a left MCA aneurysm with a broad neck involving bilateral M2 use measuring 5 x 6 mm

## 2018-10-12 NOTE — OP NOTE
OPERATIVE REPORT  PATIENT NAME: Brionna Hussein     :  1950  MRN: [id number]   Pt Location: Interventional radiology    SURGERY DATE: 10/12/18     Preop Diagnosis:  1  Incidental L MCA aneurysm    Postop Diagnosis  1  Incidental L MCA aneurysm    Procedure:  Right Common Carotid Arteriogram  Right Internal Carotid Arteriogram  Left Internal Carotid Arteriogram  Left Vertebral Artery Arteriogram  Limited Right Femoral Arteriogram    Surgeon:   Clifton Wynne MD    Specimen(s):  None    Estimated Blood Loss:   None    Drains:  None    Anesthesia Type:   Monitored Anesthesia Care     Complications:  None    Operative Indications:  Brionna Hussein  is a very pleasant 77 y o  female who was found to have an incidental L MCA aneurysm  As such we discussed the risks and benefits of a diagnostic cerebral arteriogram to better define the architecture of this lesion  She understood the risks included bleeding vascular injury, stroke, kidney injury, death  She elected to proceed  Procedure Details:  After obtaining written informed consent, the patient was brought into the operating room and moved to the OR table in supine fashion  The groin was prepped and draped in the usual sterile fashion  Monitored anesthesia care was induced  10 cc of intradermal lidocaine was infused into the right femoral area and percutaneous access with a 5-Jamaican micropuncture kit was obtained into the right femoral artery  A 5-Jamaican introducer sheath was placed and a 5-Jamaican angled glide catheter was then advanced into the aorta, and over the aortic arch over a Glidewire  The right common carotid artery was then catheterized  AP lateral and oblique images of the right cervical carotid were obtained  The catheter was then advanced and the right internal carotid artery was then catheterized  AP lateral and magnified oblique images of the right intracranial carotid circulation were obtained       The catheter was then withdrawn into the aortic arch and advanced into the left common carotid artery  AP lateral images of the left cervical carotid were obtained  The catheter was then advanced and the left internal carotid artery was then catheterized  AP lateral and magnified oblique images of the left intracranial carotid circulation were obtained  A 3D rotational arteriogram of the left internal carotid artery was then performed  Post processed images were then reviewed and analyzed a separate workstation  The catheter was then withdrawn into the aortic arch and advanced into the left vertebral artery  Transfascial lateral and oblique images of the left vertebral artery intracranial circulation were obtained  The catheter was then withdrawn from the body and a limited right femoral arteriogram was run  Puncture site was found to be compatible with a Mynx Closure device and this was done successfully  There was appropriate hemostasis  The patient was then awoken from his monitored anesthesia care and found to be in his neurologic baseline  All sponge and needle counts were correct  INTERPRETATION OF ANGIOGRAPHIC FINDINGS:   1  The Right common carotid circulation reveals antegrade flow into the internal and external carotid arteries  There is no hemodynamically significant carotid stenosis as defined by NASCET criteria  2  The Right internal carotid artery circulation reveals antegrade flow into the middle cerebral and anterior cerebral arteries  There is a patent anterior communicating artery  There is a fetal origin of the right PCA  There is no evidence of aneurysm, AVM, or vascular malformation  The capillary and venous phases are unremarkable  3  The Left common carotid circulation reveals antegrade flow into the internal and external carotid arteries  There is no hemodynamically significant carotid stenosis as defined by NASCET criteria        4  The Left internal carotid artery circulation reveals antegrade flow into the middle cerebral and anterior cerebral arteries  There is a non saccular, the rather fusiform aneurysm of the left MCA bifurcation  There are some irregularities within the walls  The aneurysm incorporate both M2 branches  The length and width of the regularity is approximately 6 x 4 mm  The capillary and venous phases are unremarkable  5   Post processed images from the 3D rotational arteriogram were used to create a 3D angiogram   There the irregularities of the aneurysm is well of this it's fusiform nature is better elicited  6  The Left vertebral intracranial circulation reveals retrograde reflux down the contralateral vertebral artery  Both PICAs are well visualized  There is an absent right P1 consistent with a fetal origin  The basilar apex is dilated in non aneurysmal fashion  There are no AVMs or aneurysms  The capillary and venous phases are unremarkable  Limited Right femoral arteriogram reveals normal puncture site anatomy  Impression:  Fusiform aneurysm of the left MCA bifurcation      Patient Disposition:  PACU     SIGNATURE: Winnie Keyes MD  DATE: 10/12/18   TIME: 0930

## 2018-10-12 NOTE — SEDATION DOCUMENTATION
Diagnostic cerebral angio by Dr Deandre Stephens with anesthesia, right groin, mynx closure, no complications, pt transported to PACU

## 2018-10-12 NOTE — ANESTHESIA PREPROCEDURE EVALUATION
Review of Systems/Medical History          Cardiovascular  Hyperlipidemia, Hypertension ,    Pulmonary  No pneumonia,        GI/Hepatic            Endo/Other  No diabetes ,      GYN       Hematology   Musculoskeletal    No arthritis     Neurology      Comment: Aneurysm Psychology           Physical Exam    Airway    Mallampati score: I  TM Distance: >3 FB       Dental       Cardiovascular  Rhythm: regular, Rate: normal,     Pulmonary  Breath sounds clear to auscultation,     Other Findings        Anesthesia Plan  ASA Score- 3     Anesthesia Type- IV sedation with anesthesia with ASA Monitors  Additional Monitors:   Airway Plan:         Plan Factors-    Induction- intravenous  Postoperative Plan-     Informed Consent- Anesthetic plan and risks discussed with patient  I personally reviewed this patient with the CRNA  Discussed and agreed on the Anesthesia Plan with the CRNA  Sayda Kirk

## 2018-10-12 NOTE — DISCHARGE INSTRUCTIONS
Today, you underwent a diagnostic cerebral angiogram under the care of Dr Rachid Madrid for evaluation of ***  ? The following instructions will help you care for yourself, or be cared for upon your return home today  These are guidelines for your care right after your surgery only  ? Notify Your Doctor or Nurse if you have any of the following:  ? SYMPTOMS OF WOUND INFECTION--   Increased pain in or around the incision   Swelling around the incision  Any drainage from the incision  Incision separates or opens up  Warmth in the tissues around the incision  Redness or tenderness on the skin near the incision   Fever (temperature greater than 101 degrees F)   ? NEUROLOGICAL CHANGES--  Change in alertness  Increased sleepiness   Nausea and vomiting   New onset of numbness or weakness in arms or legs   New problems with your bowels or bladder  New or worse problems with balance or walking  Seizures, new or worsening  ? UNRELIEVED HEADACHE PAIN--  New or increased pain unrelieved with pain medications   Pain associated with nausea and vomiting   Pain associated with other symptoms  ? QUESTIONS OR PROBLEMS--  Any questions or problems that you are unsure about  Wound Care:  Keep Incision Clean and Dry   You may shower daily, but do not soak incision  Pat dry after showering  No tub baths, soaking, swimming for 1 week after angiogram    You do not need to cover the incision  Mild to moderate bruising and tenderness to the site is expected and may last up to 1-2 weeks after your procedure  ?  A closure device was placed at the catheter insertion site  This is MRI compatible  Remove the dressing 24 hours after your procedure  If your groin site is bleeding, apply firm pressure for 10 minutes  Reinforce dressing rather than removing and checking frequently  If continues to bleed through the dressing after 1 hour, contact your neurosurgeon's office  Anticipatory Education:  ?   PAIN MED W/ Acetaminophen (Tylenol)  --IF a prescription for pain medicine has been sent home with you:  --Narcotic pain medication may cause constipation  Be sure to take stool softeners or laxatives while you are on narcotic pain medication  --Do not drive after taking prescription pain medicine  ?  If this medicine is too strong, or no longer necessary, or we did NOT recommend/prescribe oral narcotics, you may take:   - Tylenol Extra-strength/Acetaminophen, 2 tablets every 4-6 hours as needed for mild pain  DO NOT TAKE MORE THAN 4000MG PER DAY from combined sources  NOTE: Remember to eat when taking pain medicines in order to avoid nausea  Watch for constipation  Eat plenty of fruits, vegetables, juices, and drink 6-8 glasses of water each day  Constipation: Stay active and drink at least 6-8 cups of fluid each day to prevent constipation  If you need a laxative or stool softener follow the package directions or consult with your local pharmacists if you have questions  ? After anesthesia, rest for 24 hours  Do not drive, drink alcohol beverages or make any important decisions during this time  General anesthesia may cause sore throat, jaw discomfort or muscle aches  These symptoms can last for one or two days  Activity: Please follow these instructions:  Advance your activity as you can tolerate  You may do light house work; nothing strenuous   You may walk all you want  You may go up and down the steps  Use the railing for support  Do not do excessive bending, straining or heavy lifting for 48 hours after your procedure  Do not drive or return to work until you are instructed   It is normal for your energy level and sleep patterns to change after surgery  Get extra sleep at night and take naps during the day to help you feel less tired  Take rest periods during the day  Complete recovery may take several weeks  ?  You may resume driving after 73-20 hours recovery  You may return to work after 48 hours of recovery     ?  Diet:  Your doctor has recommended that you follow these diet instructions at home  Refer to the patient education materials you received during your hospital stay  If you would like more nutrition counseling, ask your doctor about making an appointment with an outpatient dietitian  Resume your home diet  ? Medications:  Please resume your home medications as instructed  ? Home Supplies and Equipment:  none  Additional Contacts:  ? CONTACTS FOR NEUROSURGERY: You may call your neurosurgeons office if you have questions between 8:30 am and 4:30 pm  You may request to speak to the nurse practitioner who is available Monday through Friday  ?  For off hours or the weekend you may call your neurosurgeon's office to leave a message

## 2018-10-12 NOTE — ANESTHESIA POSTPROCEDURE EVALUATION
Post-Op Assessment Note      CV Status:  Stable    Mental Status:  Alert and awake    Hydration Status:  Stable    PONV Controlled:  None    Airway Patency:  Patent    Post Op Vitals Reviewed: Yes          Staff: CRNA       Comments: Pt  to [facility]  Report given  Course uneventful  VSS  Course unevnetful  Neuro  intact            BP      Temp      Pulse 63 (10/12/18 0921)   Resp 16 (10/12/18 0921)    SpO2 98 % (10/12/18 0921)

## 2018-10-15 ENCOUNTER — TELEPHONE (OUTPATIENT)
Dept: NEUROSURGERY | Facility: CLINIC | Age: 68
End: 2018-10-15

## 2018-10-15 NOTE — TELEPHONE ENCOUNTER
1st attempt - called Kadie Kwan on primary number s/p angio performed 10/12/2018  There was no answer and no mailbox to leave message  Will make second attempt tomorrow if no callback is received

## 2018-10-17 NOTE — TELEPHONE ENCOUNTER
2nd attempt - called Awa Appiah on primary number s/p angio performed 10/12/2018  There was no answer and no mailbox so unable to leave message to call back  Will make third attempt if no callback is received

## 2018-10-26 ENCOUNTER — TRANSCRIBE ORDERS (OUTPATIENT)
Dept: ADMINISTRATIVE | Facility: HOSPITAL | Age: 68
End: 2018-10-26

## 2018-10-26 ENCOUNTER — TELEPHONE (OUTPATIENT)
Dept: INTERNAL MEDICINE CLINIC | Facility: CLINIC | Age: 68
End: 2018-10-26

## 2018-10-26 DIAGNOSIS — Z12.39 SCREENING BREAST EXAMINATION: Primary | ICD-10-CM

## 2018-10-26 NOTE — TELEPHONE ENCOUNTER
Patient scheduled her mammogram, but radiology said that last time she had the 3D mammo, but this time that type was not ordered, but it looks like they put it in anyway and said that you need to sign for it

## 2018-10-29 ENCOUNTER — OFFICE VISIT (OUTPATIENT)
Dept: NEUROSURGERY | Facility: CLINIC | Age: 68
End: 2018-10-29
Payer: COMMERCIAL

## 2018-10-29 ENCOUNTER — TRANSCRIBE ORDERS (OUTPATIENT)
Dept: NEUROSURGERY | Facility: CLINIC | Age: 68
End: 2018-10-29

## 2018-10-29 VITALS
WEIGHT: 222 LBS | TEMPERATURE: 97.2 F | BODY MASS INDEX: 36.99 KG/M2 | RESPIRATION RATE: 16 BRPM | DIASTOLIC BLOOD PRESSURE: 98 MMHG | HEART RATE: 70 BPM | SYSTOLIC BLOOD PRESSURE: 160 MMHG | HEIGHT: 65 IN

## 2018-10-29 DIAGNOSIS — I10 ACCELERATED ESSENTIAL HYPERTENSION: ICD-10-CM

## 2018-10-29 DIAGNOSIS — Z01.818 PRE-PROCEDURAL EXAMINATION: Primary | ICD-10-CM

## 2018-10-29 DIAGNOSIS — I67.1 CEREBRAL ANEURYSM: Primary | ICD-10-CM

## 2018-10-29 PROCEDURE — 99214 OFFICE O/P EST MOD 30 MIN: CPT | Performed by: NEUROLOGICAL SURGERY

## 2018-10-29 NOTE — PROGRESS NOTES
Her BP was high at the neurosurgeon's office  They want us to recheck this and make sure that her blood pressure stays under 140  Can you call her and bring her in later this week for blood pressure check and see if she has a blood pressure cuff at home to follow her blood pressure at home  If she does ask her to follow her blood pressure at least once a day at home and ask her to bring this with her when she comes in for blood pressure check

## 2018-10-29 NOTE — PROGRESS NOTES
Patient Id: Wilfredo Araya is a 77 y o  female        Assessment/Plan:    Diagnoses and all orders for this visit:    Cerebral aneurysm  -     CTA head w wo contrast; Future        Discussion Summary:   1  Unruptured cerebral aneurysm, left MCA bifurcation incorporating both M2 use with irregularity  This aneurysm is fusiform in nature  We discussed the natural history of cerebral aneurysms as well of subarachnoid hemorrhage  We discussed that typical natural history studies likely do not completely applied her due to the nature of her aneurysm  However I do believe treatment of this aneurysm would be difficult  This is both with an endovascular open vascular fashion  She can have open surgery of vessel reconstruction however proximal portion of the vessels would likely be disease and put her at risk for injury to her distal M2 branches  Similarly why stenting with coiling could partially treat the aneurysm were protected  Either these procedures I believe would have a 4-98% risk complication  Should the aneurysm remained stable I believe that her annual risk is probably an area of 1% per year  This is based on ISIUA and UCAS, which once again are not specific to her disease  UIATS would favor conservative management (8 vs 14)  At this juncture we will proceed with conservative management and repeat CTA 1 year from her prior in July  Should this remained stable we will rediscuss options at that time  If the aneurysm is growing surgical options will be rediscuss  I also discussed the option of a 2nd opinion with him  2    Hypertension  Blood pressure 160 today  We discussed the importance of tight blood pressure control with a goal of less than 140  I asked her to approach her PCP for better control of this  I will send Dr Seema Chinchilla a note as well  I spent 40 minutes with the patient greater than 50 percent of which was spent in counseling       Chief Complaint: Follow-up (2 week angiogram follow up)        HPI:   This is a very pleasant 59-year-old female who presented to [facility] as a trauma after falling from a step ladder and landing on her left side  She suffered multiple rib fractures  Head CT was performed as well as a CTA  This revealed an incidental left MCA aneurysm measuring 5 x 6 mm  For this reason she was referred to Neurosurgery for evaluation  she recently underwent an arteriogram approximately 2 weeks ago  He returns for follow-up  She denies any new neurologic symptoms  She does state that she has had infrequent but intermittent vertigo  When occurs she extreme nausea and dizziness which only improved after sleeping  It is positional in nature  Denies any other neurologic changes                          Her review of systems otherwise negative      Her past medical history significant for hypertension, hyperlipidemia, and leg swelling  Her past surgical history significant for hysterectomy and bilateral carpal tunnel surgery      She has no family history of subarachnoid hemorrhage or aneurysm  She has no family history of sudden death      She is  for 46 years  She has 5 children ages 37-63  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  She denies any history of tobacco or alcohol  She denies any history of drug use      She is allergic to penicillin  She currently takes acetaminophen, atorvastatin, Lasix, Robaxin, valsartan, and hydrochlorothiazide  Review of Systems   Constitutional: Negative  HENT: Negative  Eyes: Negative  Respiratory: Negative  Cardiovascular: Negative  Gastrointestinal: Negative  Endocrine: Negative  Genitourinary: Negative  Musculoskeletal: Negative  Skin: Negative  Allergic/Immunologic: Negative  Neurological: Positive for headaches (once in a while)   Negative for dizziness, tremors, seizures, syncope, facial asymmetry, speech difficulty, weakness, light-headedness and numbness  Hematological: Negative  Psychiatric/Behavioral: Negative  Physical Exam  Vitals:    10/29/18 1231   BP: 160/98   Pulse: 70   Resp: 16   Temp: (!) 97 2 °F (36 2 °C)   She is well appearing and in no acute distress  She is mildly obese with a BMI of 36  Her affect is appropriate  She is intermittently tearful during this conversation  Her pupils are equal round reactive to light  Her extraocular movements are intact  Her face is symmetric  Tongue is midline  Facial sensation intact and symmetric throughout  Her vision is grossly intact as well as her sense of hearing  Her shoulder shrug is 5/5  She has no drift  She has no dysmetria  She has full strength in her bilateral upper and lower extremities  She has normal muscle tone and muscle bulk  She is able to ambulate without difficulty      The following portions of the patient's history were reviewed and updated as appropriate: allergies, current medications, past family history, past medical history, past social history, past surgical history and problem list     Active Ambulatory Problems     Diagnosis Date Noted    Carpal tunnel syndrome 04/08/2016    Accelerated essential hypertension 12/06/2013    Dyslipidemia 07/09/2014    Class 2 obesity due to excess calories with body mass index (BMI) of 36 0 to 36 9 in adult 02/05/2013    Aortic stenosis, mild 03/19/2018    Complex endometrial hyperplasia 06/23/2017    Fatigue 12/14/2012    Female pelvic pain 04/03/2017    Type 2 diabetes mellitus without complication, without long-term current use of insulin ([facility] ) 01/21/2015    Insomnia 03/07/2017    Osteopenia 05/14/2014    Primary osteoarthritis of right knee 05/31/2016    Snoring 07/08/2014    Tendonitis of left hip 06/11/2015    Thickened endometrium 06/01/2017    Asymptomatic varicose veins 06/12/2012    Vitamin D deficiency 06/11/2015    Closed fracture of multiple ribs of left side with routine healing 2018    Fall 2018    Cerebral aneurysm 2018    Left leg swelling 2018    Abnormal electromyography 2018    Numbness of hand 2018    Hyperhidrosis 2018    Grieving 2018     Resolved Ambulatory Problems     Diagnosis Date Noted    Systolic ejection murmur     Pneumothorax, left 2018     Past Medical History:   Diagnosis Date    Arthritis     Carpal tunnel syndrome     Cataract     Decreased body height     Heart murmur     Hypercholesteremia     Hypertension     Motion sickness     Obesity     Periodic heart flutter     Pneumonia     PONV (postoperative nausea and vomiting)     Rosacea     Vertigo     Wears glasses     Wears partial dentures        Past Surgical History:   Procedure Laterality Date    CARPAL TUNNEL RELEASE Bilateral      SECTION      x1    COLONOSCOPY      IR CEREBRAL ANGIOGRAPHY  10/12/2018    AZ LAPAROSCOPY W TOT HYSTERECTUTERUS <=250 GRAM  W TUBE/OVARY N/A 2017    Procedure: HYSTERECTOMY LAPAROSCOPIC TOTAL, BSO, Cystoscopy;  Surgeon: Jordan Covarrubias MD;  Location: [facility];  Service: Gynecology    AZ REVISE MEDIAN N/CARPAL TUNNEL SURG Left 2016    Procedure: RELEASE CARPAL TUNNEL;  Surgeon: Silvano Castorena MD;  Location: [facility];  Service: Orthopedics         Current Outpatient Prescriptions:     atorvastatin (LIPITOR) 20 mg tablet, Take 20 mg by mouth daily at bedtime, Disp: , Rfl:     Coenzyme Q10 (COQ-10) 100 MG CAPS, Take by mouth daily, Disp: , Rfl:     metroNIDAZOLE (METROGEL) 0 75 % gel, , Disp: , Rfl:     Multiple Vitamin (DAILY VALUE MULTIVITAMIN) TABS, Take 1 tablet by mouth daily, Disp: , Rfl:     valsartan-hydrochlorothiazide (DIOVAN-HCT) 320-25 MG per tablet, Take 1 tablet by mouth daily, Disp: , Rfl:     acetaminophen (TYLENOL) 325 mg tablet, Take 2 tablets (650 mg total) by mouth every 6 (six) hours as needed for mild pain (Patient not taking: Reported on 10/29/2018 ), Disp: 30 tablet, Rfl: 0    Results/Data: We reviewed her angiogram in detail  She has a fusiform aneurysm involving the left MCA bifurcation and both M2 branches with some areas of irregularity

## 2018-10-29 NOTE — LETTER
October 29, 2018     Brett Tamez, [address] [facility]  [address]    Patient: Sujey Caal   YOB: 1950   Date of Visit: 10/29/2018       Dear Dr Davey Coley:    Thank you for referring Arlin Baer to me for evaluation  Below are my notes for this consultation  She was hypertensive in this visit, but forgot her medication this morning  I have asked her to reach out and contact you to discuss modification of her antihypertensive medications if necessary  Ideally I would like her systolic blood pressure to be less than 140  If you have questions, please do not hesitate to call me  I look forward to following your patient along with you  Sincerely,        Mike Mccauley MD        CC: No Recipients  Mike Mccauley MD  10/29/2018  1:40 PM  Sign at close encounter  Patient Id: Sujey Caal is a 77 y o  female        Assessment/Plan:    Diagnoses and all orders for this visit:    Cerebral aneurysm  -     CTA head w wo contrast; Future        Discussion Summary:   1  Unruptured cerebral aneurysm, left MCA bifurcation incorporating both M2 use with irregularity  This aneurysm is fusiform in nature  We discussed the natural history of cerebral aneurysms as well of subarachnoid hemorrhage  We discussed that typical natural history studies likely do not completely applied her due to the nature of her aneurysm  However I do believe treatment of this aneurysm would be difficult  This is both with an endovascular open vascular fashion  She can have open surgery of vessel reconstruction however proximal portion of the vessels would likely be disease and put her at risk for injury to her distal M2 branches  Similarly why stenting with coiling could partially treat the aneurysm were protected  Either these procedures I believe would have a 4-99% risk complication    Should the aneurysm remained stable I believe that her annual risk is probably an area of 1% per year  This is based on ISIUA and UCAS, which once again are not specific to her disease  UIATS would favor conservative management (8 vs 14)  At this juncture we will proceed with conservative management and repeat CTA 1 year from her prior in July  Should this remained stable we will rediscuss options at that time  If the aneurysm is growing surgical options will be rediscuss  I also discussed the option of a 2nd opinion with him  2    Hypertension  Blood pressure 160 today  We discussed the importance of tight blood pressure control with a goal of less than 140  I asked her to approach her PCP for better control of this  I will send Dr Alannah Lares a note as well  I spent 40 minutes with the patient greater than 50 percent of which was spent in counseling  Chief Complaint: Follow-up (2 week angiogram follow up)        HPI:   This is a very pleasant 75-year-old female who presented to [facility] as a trauma after falling from a step ladder and landing on her left side  She suffered multiple rib fractures  Head CT was performed as well as a CTA  This revealed an incidental left MCA aneurysm measuring 5 x 6 mm  For this reason she was referred to Neurosurgery for evaluation  she recently underwent an arteriogram approximately 2 weeks ago  He returns for follow-up  She denies any new neurologic symptoms  She does state that she has had infrequent but intermittent vertigo  When occurs she extreme nausea and dizziness which only improved after sleeping  It is positional in nature  Denies any other neurologic changes                          Her review of systems otherwise negative      Her past medical history significant for hypertension, hyperlipidemia, and leg swelling  Her past surgical history significant for hysterectomy and bilateral carpal tunnel surgery      She has no family history of subarachnoid hemorrhage or aneurysm    She has no family history of sudden death      She is  for 51 years  She has 5 children ages 37-63  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  She denies any history of tobacco or alcohol  She denies any history of drug use      She is allergic to penicillin  She currently takes acetaminophen, atorvastatin, Lasix, Robaxin, valsartan, and hydrochlorothiazide  Review of Systems   Constitutional: Negative  HENT: Negative  Eyes: Negative  Respiratory: Negative  Cardiovascular: Negative  Gastrointestinal: Negative  Endocrine: Negative  Genitourinary: Negative  Musculoskeletal: Negative  Skin: Negative  Allergic/Immunologic: Negative  Neurological: Positive for headaches (once in a while)  Negative for dizziness, tremors, seizures, syncope, facial asymmetry, speech difficulty, weakness, light-headedness and numbness  Hematological: Negative  Psychiatric/Behavioral: Negative  Physical Exam  Vitals:    10/29/18 1231   BP: 160/98   Pulse: 70   Resp: 16   Temp: (!) 97 2 °F (36 2 °C)   She is well appearing and in no acute distress  She is mildly obese with a BMI of 36  Her affect is appropriate  She is intermittently tearful during this conversation  Her pupils are equal round reactive to light  Her extraocular movements are intact  Her face is symmetric  Tongue is midline  Facial sensation intact and symmetric throughout  Her vision is grossly intact as well as her sense of hearing  Her shoulder shrug is 5/5  She has no drift  She has no dysmetria  She has full strength in her bilateral upper and lower extremities  She has normal muscle tone and muscle bulk  She is able to ambulate without difficulty      The following portions of the patient's history were reviewed and updated as appropriate: allergies, current medications, past family history, past medical history, past social history, past surgical history and problem list     Active Ambulatory Problems     Diagnosis Date Noted    Carpal tunnel syndrome 2016    Accelerated essential hypertension 2013    Dyslipidemia 2014    Class 2 obesity due to excess calories with body mass index (BMI) of 36 0 to 36 9 in adult 2013    Aortic stenosis, mild 2018    Complex endometrial hyperplasia 2017    Fatigue 2012    Female pelvic pain 2017    Type 2 diabetes mellitus without complication, without long-term current use of insulin ([facility] [address] ) 2015    Insomnia 2017    Osteopenia 2014    Primary osteoarthritis of right knee 2016    Snoring 2014    Tendonitis of left hip 2015    Thickened endometrium 2017    Asymptomatic varicose veins 2012    Vitamin D deficiency 2015    Closed fracture of multiple ribs of left side with routine healing 2018    Fall 2018    Cerebral aneurysm 2018    Left leg swelling 2018    Abnormal electromyography 2018    Numbness of hand 2018    Hyperhidrosis 2018    Grieving 2018     Resolved Ambulatory Problems     Diagnosis Date Noted    Systolic ejection murmur     Pneumothorax, left 2018     Past Medical History:   Diagnosis Date    Arthritis     Carpal tunnel syndrome     Cataract     Decreased body height     Heart murmur     Hypercholesteremia     Hypertension     Motion sickness     Obesity     Periodic heart flutter     Pneumonia     PONV (postoperative nausea and vomiting)     Rosacea     Vertigo     Wears glasses     Wears partial dentures        Past Surgical History:   Procedure Laterality Date    CARPAL TUNNEL RELEASE Bilateral      SECTION      x1    COLONOSCOPY      IR CEREBRAL ANGIOGRAPHY  10/12/2018    HI LAPAROSCOPY W TOT HYSTERECTUTERUS <=250 GRAM  W TUBE/OVARY N/A 2017    Procedure: HYSTERECTOMY LAPAROSCOPIC TOTAL, BSO, Cystoscopy;  Surgeon: Jenny Castle MD; Location: [facility];  Service: Gynecology    AR REVISE MEDIAN N/CARPAL TUNNEL SURG Left 4/8/2016    Procedure: RELEASE CARPAL TUNNEL;  Surgeon: Keke Ennis MD;  Location: [facility];  Service: Orthopedics         Current Outpatient Prescriptions:     atorvastatin (LIPITOR) 20 mg tablet, Take 20 mg by mouth daily at bedtime, Disp: , Rfl:     Coenzyme Q10 (COQ-10) 100 MG CAPS, Take by mouth daily, Disp: , Rfl:     metroNIDAZOLE (METROGEL) 0 75 % gel, , Disp: , Rfl:     Multiple Vitamin (DAILY VALUE MULTIVITAMIN) TABS, Take 1 tablet by mouth daily, Disp: , Rfl:     valsartan-hydrochlorothiazide (DIOVAN-HCT) 320-25 MG per tablet, Take 1 tablet by mouth daily, Disp: , Rfl:     acetaminophen (TYLENOL) 325 mg tablet, Take 2 tablets (650 mg total) by mouth every 6 (six) hours as needed for mild pain (Patient not taking: Reported on 10/29/2018 ), Disp: 30 tablet, Rfl: 0    Results/Data: We reviewed her angiogram in detail  She has a fusiform aneurysm involving the left MCA bifurcation and both M2 branches with some areas of irregularity

## 2018-10-31 DIAGNOSIS — E78.5 HYPERLIPIDEMIA, UNSPECIFIED HYPERLIPIDEMIA TYPE: Primary | ICD-10-CM

## 2018-10-31 RX ORDER — ATORVASTATIN CALCIUM 20 MG/1
20 TABLET, FILM COATED ORAL
Qty: 90 TABLET | Refills: 3 | Status: SHIPPED | OUTPATIENT
Start: 2018-10-31 | End: 2020-05-07 | Stop reason: ALTCHOICE

## 2018-11-02 ENCOUNTER — HOSPITAL ENCOUNTER (OUTPATIENT)
Dept: MAMMOGRAPHY | Facility: HOSPITAL | Age: 68
Discharge: HOME | End: 2018-11-02
Payer: COMMERCIAL

## 2018-11-02 DIAGNOSIS — Z12.39 SCREENING BREAST EXAMINATION: ICD-10-CM

## 2018-11-02 DIAGNOSIS — I15.8 OTHER SECONDARY HYPERTENSION: Primary | ICD-10-CM

## 2018-11-02 PROCEDURE — 77063 BREAST TOMOSYNTHESIS BI: CPT

## 2018-11-02 PROCEDURE — 77067 SCR MAMMO BI INCL CAD: CPT

## 2018-11-02 RX ORDER — HYDROCHLOROTHIAZIDE 25 MG/1
25 TABLET ORAL DAILY
Qty: 90 TABLET | Refills: 3 | Status: SHIPPED | OUTPATIENT
Start: 2018-11-02 | End: 2018-12-30 | Stop reason: ALTCHOICE

## 2018-11-06 ENCOUNTER — OFFICE VISIT (OUTPATIENT)
Dept: INTERNAL MEDICINE CLINIC | Facility: CLINIC | Age: 68
End: 2018-11-06
Payer: COMMERCIAL

## 2018-11-06 VITALS
WEIGHT: 216 LBS | DIASTOLIC BLOOD PRESSURE: 60 MMHG | OXYGEN SATURATION: 98 % | BODY MASS INDEX: 35.99 KG/M2 | TEMPERATURE: 97.8 F | HEIGHT: 65 IN | SYSTOLIC BLOOD PRESSURE: 110 MMHG | HEART RATE: 74 BPM

## 2018-11-06 DIAGNOSIS — E55.9 VITAMIN D DEFICIENCY: ICD-10-CM

## 2018-11-06 DIAGNOSIS — I67.1 CEREBRAL ANEURYSM: ICD-10-CM

## 2018-11-06 DIAGNOSIS — R53.83 FATIGUE, UNSPECIFIED TYPE: ICD-10-CM

## 2018-11-06 DIAGNOSIS — E78.5 DYSLIPIDEMIA: ICD-10-CM

## 2018-11-06 DIAGNOSIS — I10 ACCELERATED ESSENTIAL HYPERTENSION: ICD-10-CM

## 2018-11-06 DIAGNOSIS — E11.9 TYPE 2 DIABETES MELLITUS WITHOUT COMPLICATION, WITHOUT LONG-TERM CURRENT USE OF INSULIN (HCC): Primary | ICD-10-CM

## 2018-11-06 DIAGNOSIS — Z23 NEED FOR PNEUMOCOCCAL VACCINE: ICD-10-CM

## 2018-11-06 PROCEDURE — 3074F SYST BP LT 130 MM HG: CPT | Performed by: FAMILY MEDICINE

## 2018-11-06 PROCEDURE — 3078F DIAST BP <80 MM HG: CPT | Performed by: FAMILY MEDICINE

## 2018-11-06 PROCEDURE — 90732 PPSV23 VACC 2 YRS+ SUBQ/IM: CPT

## 2018-11-06 PROCEDURE — 99214 OFFICE O/P EST MOD 30 MIN: CPT | Performed by: FAMILY MEDICINE

## 2018-11-06 PROCEDURE — 90471 IMMUNIZATION ADMIN: CPT

## 2018-11-09 DIAGNOSIS — I10 ESSENTIAL HYPERTENSION: Primary | ICD-10-CM

## 2018-11-09 RX ORDER — LOSARTAN POTASSIUM 25 MG/1
25 TABLET ORAL DAILY
Qty: 30 TABLET | Refills: 11 | Status: SHIPPED | OUTPATIENT
Start: 2018-11-09 | End: 2018-11-14 | Stop reason: SDUPTHER

## 2018-11-13 NOTE — PROGRESS NOTES
Assessment/Plan:    No problem-specific Assessment & Plan notes found for this encounter  Diagnoses and all orders for this visit:    Type 2 diabetes mellitus without complication, without long-term current use of insulin (HCC)  -     Comprehensive metabolic panel; Future  -     HEMOGLOBIN A1C W/ EAG ESTIMATION; Future    Need for pneumococcal vaccine  -     Pneumococcal polysaccharide vaccine 23-valent greater than or equal to 1yo subcutaneous/IM    Accelerated essential hypertension  -     CBC and differential; Future  -     Comprehensive metabolic panel; Future    Cerebral aneurysm  -     CBC and differential; Future    Dyslipidemia  -     Comprehensive metabolic panel; Future  -     Lipid panel; Future  -     TSH, 3rd generation; Future    Fatigue, unspecified type  -     TSH, 3rd generation; Future    Vitamin D deficiency  -     Vitamin D 25 hydroxy; Future        Orders and recommendations as noted above  Recent visit with specialists reviewed  Blood pressures had been elevated at numerous visits  Discussed with her that keeping her blood pressure under very good control given the cerebral aneurysm is indicated  Discussed with her adding additional medication  She would like me to contact Dr Alyssa Nolasco  Will task her and await her response  Follow blood pressure at home if possible  Watch salt intake  Continue the atorvastatin  Watch fatty foods  Increase fiber  Methods for stress relief discussed  Would like to avoid further medications  Discussed watching her carbohydrate intake  Increase activity  Continue to follow up with Ophthalmology on a regular basis  Continue to follow up with Podiatry regularly  Continue with vitamin-D supplementation  Will have her follow up in about 3-4 months or sooner if needed based on her laboratory testing  Subjective:      Patient ID: Kenneth Mendoza is a 77 y o  female  She presents for routine follow-up  Has been under a lot more stress  She is now back to work but is locally back part time  Work is still stressful  She also has stressors at home with her   She continues to work because they would not be financially stable if she did not continue to work  Works night shift  Does not get a lot of sleep consecutively  Sleep tends to be very interrupted  Has been more worried recently because of the diagnosis of the cerebral aneurysm  She did follow-up for the testing and this was confirmed  They are going to plan to follow this  She was hopeful that something could be definitively done for it so that she did not have to worry about it as much  Blood pressure has been more variable at the recent appointments with the specialist   She is concerned about this because of a change in her blood pressure medication  Appetite has been generally good  Denies any nausea, vomiting, or diarrhea  Tolerating the atorvastatin without difficulty  Denies any significant muscle aches or weakness  Has recovered from the fall over the summer and the numerous rib fractures  Still has some soreness if she pushes on the area or moves a certain way but for the most part is asymptomatic  Does not follow her blood sugars on a regular basis  Denies any significant polyuria, polydipsia, or polyphagia  Denies any changes in vision  Follows up with Ophthalmology on a regular basis  Follows up with Podiatry  Taking vitamin-D supplementation  Follows up with Cardiology  Denies any chest pain or palpitations  Denies any significant shortness of breath  Is now concerned about the mammogram which showed some abnormality that needs further investigation  The following portions of the patient's history were reviewed and updated as appropriate:   She  has a past medical history of Arthritis; Carpal tunnel syndrome; Cataract; Decreased body height; Heart murmur; Hypercholesteremia; Hypertension; Motion sickness; Obesity;  Periodic heart flutter; Pneumonia; PONV (postoperative nausea and vomiting); Rosacea; Vertigo; Wears glasses; and Wears partial dentures  She   Patient Active Problem List    Diagnosis Date Noted    Hyperhidrosis 2018    Grieving 2018    Abnormal electromyography 2018    Numbness of hand 2018    Left leg swelling 2018    Closed fracture of multiple ribs of left side with routine healing 2018    Fall 2018    Cerebral aneurysm 2018    Aortic stenosis, mild 2018    Complex endometrial hyperplasia 2017    Thickened endometrium 2017    Female pelvic pain 2017    Insomnia 2017    Primary osteoarthritis of right knee 2016    Carpal tunnel syndrome 2016    Tendonitis of left hip 2015    Vitamin D deficiency 2015    Type 2 diabetes mellitus without complication, without long-term current use of insulin ([facility] [address] ) 2015    Dyslipidemia 2014    Snoring 2014    Osteopenia 2014    Accelerated essential hypertension 2013    Class 2 obesity due to excess calories with body mass index (BMI) of 36 0 to 36 9 in adult 2013    Fatigue 2012    Asymptomatic varicose veins 2012     She  has a past surgical history that includes Colonoscopy;  section; pr revise median n/carpal tunnel surg (Left, 2016); Carpal tunnel release (Bilateral); pr laparoscopy w tot hysterectuterus <=250 gram  w tube/ovary (N/A, 2017); and IR cerebral angiography (10/12/2018)  Her family history includes Arthritis in her mother; Diabetes in her father; Hyperlipidemia in her sister; Hypertension in her mother; Stroke in her mother; Sudden death in her brother and father  She  reports that she has never smoked  She has never used smokeless tobacco  She reports that she does not drink alcohol or use drugs    Current Outpatient Prescriptions   Medication Sig Dispense Refill    atorvastatin (LIPITOR) 20 mg tablet Take 1 tablet (20 mg total) by mouth daily at bedtime 90 tablet 3    Coenzyme Q10 (COQ-10) 100 MG CAPS Take by mouth daily      hydrochlorothiazide (HYDRODIURIL) 25 mg tablet Take 1 tablet (25 mg total) by mouth daily 90 tablet 3    metroNIDAZOLE (METROGEL) 0 75 % gel       Multiple Vitamin (DAILY VALUE MULTIVITAMIN) TABS Take 1 tablet by mouth daily      losartan (COZAAR) 25 mg tablet Take 1 tablet (25 mg total) by mouth daily 30 tablet 11     No current facility-administered medications for this visit  Current Outpatient Prescriptions on File Prior to Visit   Medication Sig    atorvastatin (LIPITOR) 20 mg tablet Take 1 tablet (20 mg total) by mouth daily at bedtime    Coenzyme Q10 (COQ-10) 100 MG CAPS Take by mouth daily    hydrochlorothiazide (HYDRODIURIL) 25 mg tablet Take 1 tablet (25 mg total) by mouth daily    metroNIDAZOLE (METROGEL) 0 75 % gel     Multiple Vitamin (DAILY VALUE MULTIVITAMIN) TABS Take 1 tablet by mouth daily     No current facility-administered medications on file prior to visit  She is allergic to penicillins       Review of Systems   Constitutional: Positive for fatigue  Negative for activity change, appetite change, chills and fever  HENT: Negative for congestion and rhinorrhea  Eyes: Negative for visual disturbance  Respiratory: Negative for cough, chest tightness and shortness of breath  Cardiovascular: Negative for chest pain and palpitations  Gastrointestinal: Positive for constipation  Negative for abdominal pain, blood in stool, diarrhea, nausea and vomiting  See HPI   Endocrine: Negative for polydipsia, polyphagia and polyuria  Genitourinary: Negative for dysuria, frequency and urgency  Musculoskeletal: Positive for arthralgias  Negative for gait problem  Skin: Negative for color change  Neurological: Negative for dizziness and headaches  Hematological: Does not bruise/bleed easily     Psychiatric/Behavioral: Positive for sleep disturbance  Negative for confusion  The patient is not nervous/anxious  Objective:      /60 (BP Location: Left arm, Patient Position: Sitting, Cuff Size: Large)   Pulse 74   Temp 97 8 °F (36 6 °C) (Temporal)   Ht 5' 5" (1 651 m)   Wt 98 kg (216 lb)   SpO2 98%   BMI 35 94 kg/m²          Physical Exam   Constitutional: She is oriented to person, place, and time  She is cooperative  No distress  HENT:   Head: Normocephalic and atraumatic  Mouth/Throat: Oropharynx is clear and moist    Eyes: Pupils are equal, round, and reactive to light  Conjunctivae are normal  Right eye exhibits no discharge  Left eye exhibits no discharge  Cardiovascular: Normal rate, regular rhythm and normal heart sounds  Exam reveals no gallop and no friction rub  No murmur heard  Pulmonary/Chest: No respiratory distress  She has no wheezes  She has no rales  Abdominal: Bowel sounds are normal  She exhibits no distension  There is no tenderness  Musculoskeletal: She exhibits no edema  Degenerative changes bilateral knees   Lymphadenopathy:     She has no cervical adenopathy  Neurological: She is alert and oriented to person, place, and time  Skin: Skin is warm and dry  Psychiatric: She has a normal mood and affect  Her behavior is normal    Nursing note and vitals reviewed

## 2018-11-14 DIAGNOSIS — I10 ESSENTIAL HYPERTENSION: ICD-10-CM

## 2018-11-14 PROCEDURE — 4010F ACE/ARB THERAPY RXD/TAKEN: CPT | Performed by: FAMILY MEDICINE

## 2018-11-14 RX ORDER — LOSARTAN POTASSIUM 25 MG/1
25 TABLET ORAL DAILY
Qty: 90 TABLET | Refills: 3 | Status: SHIPPED | OUTPATIENT
Start: 2018-11-14 | End: 2019-01-08

## 2018-11-15 ENCOUNTER — HOSPITAL ENCOUNTER (OUTPATIENT)
Dept: ULTRASOUND IMAGING | Facility: HOSPITAL | Age: 68
Discharge: HOME | End: 2018-11-15
Payer: COMMERCIAL

## 2018-11-15 ENCOUNTER — HOSPITAL ENCOUNTER (OUTPATIENT)
Dept: MAMMOGRAPHY | Facility: HOSPITAL | Age: 68
Discharge: HOME | End: 2018-11-15
Payer: COMMERCIAL

## 2018-11-15 ENCOUNTER — TELEPHONE (OUTPATIENT)
Dept: MAMMOGRAPHY | Facility: CLINIC | Age: 68
End: 2018-11-15

## 2018-11-15 VITALS — HEIGHT: 65 IN | WEIGHT: 222 LBS | BODY MASS INDEX: 36.99 KG/M2

## 2018-11-15 DIAGNOSIS — R92.8 ABNORMAL MAMMOGRAM: ICD-10-CM

## 2018-11-15 PROCEDURE — 76642 ULTRASOUND BREAST LIMITED: CPT

## 2018-11-15 PROCEDURE — 77065 DX MAMMO INCL CAD UNI: CPT

## 2018-11-20 DIAGNOSIS — R92.8 ABNORMAL MAMMOGRAM: Primary | ICD-10-CM

## 2018-11-23 ENCOUNTER — TELEPHONE (OUTPATIENT)
Dept: INTERNAL MEDICINE CLINIC | Facility: CLINIC | Age: 68
End: 2018-11-23

## 2018-11-23 NOTE — TELEPHONE ENCOUNTER
I called Louisa Ponce to let her know we did put in a referral to General Surgery  I wanted to give her the information for Dr Leoncio Gee and advise her that Dr Baron Huddleston does suggest following the orders from radiology and following up with surgery for a possible biopsy  She did not answer, so I left a message to call us back

## 2018-11-26 NOTE — TELEPHONE ENCOUNTER
Awa Common called back and I did give her the information for Dr Jesica Amin  She will call to schedule a consult

## 2018-12-07 PROBLEM — R92.8 ABNORMAL MAMMOGRAM: Status: ACTIVE | Noted: 2018-12-07

## 2018-12-10 ENCOUNTER — HOSPITAL ENCOUNTER (EMERGENCY)
Facility: HOSPITAL | Age: 68
Discharge: HOME | End: 2018-12-10
Attending: EMERGENCY MEDICINE | Admitting: EMERGENCY MEDICINE
Payer: OTHER MISCELLANEOUS

## 2018-12-10 ENCOUNTER — OFFICE VISIT (OUTPATIENT)
Dept: SURGICAL ONCOLOGY | Facility: CLINIC | Age: 68
End: 2018-12-10
Payer: COMMERCIAL

## 2018-12-10 VITALS
RESPIRATION RATE: 18 BRPM | HEART RATE: 87 BPM | HEIGHT: 65 IN | TEMPERATURE: 97.5 F | DIASTOLIC BLOOD PRESSURE: 92 MMHG | WEIGHT: 223.99 LBS | BODY MASS INDEX: 37.32 KG/M2 | SYSTOLIC BLOOD PRESSURE: 162 MMHG | OXYGEN SATURATION: 96 %

## 2018-12-10 VITALS
HEART RATE: 72 BPM | BODY MASS INDEX: 39.34 KG/M2 | HEIGHT: 63 IN | RESPIRATION RATE: 16 BRPM | SYSTOLIC BLOOD PRESSURE: 122 MMHG | TEMPERATURE: 97.7 F | DIASTOLIC BLOOD PRESSURE: 88 MMHG | WEIGHT: 222 LBS

## 2018-12-10 DIAGNOSIS — R92.8 ABNORMAL MAMMOGRAM: Primary | ICD-10-CM
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
## 2018-12-10 LAB
ALT SERPL W P-5'-P-CCNC: 23 U/L (ref 12–78)
HBV SURFACE AB SER-ACNC: 4.88 MIU/ML
HBV SURFACE AG SER QL: NORMAL
HCV AB SER QL: NORMAL

## 2018-12-10 PROCEDURE — 99244 OFF/OP CNSLTJ NEW/EST MOD 40: CPT | Performed by: SURGERY

## 2018-12-10 PROCEDURE — 86706 HEP B SURFACE ANTIBODY: CPT | Performed by: EMERGENCY MEDICINE

## 2018-12-10 PROCEDURE — 36415 COLL VENOUS BLD VENIPUNCTURE: CPT | Performed by: EMERGENCY MEDICINE

## 2018-12-10 PROCEDURE — 86803 HEPATITIS C AB TEST: CPT | Performed by: EMERGENCY MEDICINE

## 2018-12-10 PROCEDURE — 99283 EMERGENCY DEPT VISIT LOW MDM: CPT

## 2018-12-10 PROCEDURE — 84460 ALANINE AMINO (ALT) (SGPT): CPT | Performed by: EMERGENCY MEDICINE

## 2018-12-10 PROCEDURE — 87340 HEPATITIS B SURFACE AG IA: CPT | Performed by: EMERGENCY MEDICINE

## 2018-12-10 PROCEDURE — 87389 HIV-1 AG W/HIV-1&-2 AB AG IA: CPT | Performed by: EMERGENCY MEDICINE

## 2018-12-10 RX ORDER — DIAZEPAM 5 MG/1
5 TABLET ORAL EVERY 6 HOURS PRN
Qty: 2 TABLET | Refills: 0 | Status: SHIPPED | OUTPATIENT
Start: 2018-12-10 | End: 2018-12-30 | Stop reason: ALTCHOICE

## 2018-12-10 NOTE — DISCHARGE INSTRUCTIONS
Needle Stick Injuries   WHAT YOU NEED TO KNOW:   Needle stick injuries usually happen to healthcare workers in hospitals, clinics, and labs  Needle stick injuries can also happen at home or in the community if needles are not discarded properly  Used needles may have blood or body fluids that carry HIV, the hepatitis B virus (HBV), or the hepatitis C virus (HCV)  The virus can spread to a person who gets pricked by a needle used on an infected person  DISCHARGE INSTRUCTIONS:   Ways that needle stick injuries can occur:  Needle stick injuries usually happen by accident  [place] may cause injury to you or to someone else if they were not properly discarded after use  An injury can also occur if you do not use gloves to protect your hands while you work with needles  Treatment that may be given for needle stick injuries:  Postexposure prophylaxis (PEP) may be needed  PEP is treatment that may protect a person from infection after exposure to another person's body fluids  PEP may be needed if the person whose fluids you were exposed to has a known infection  Do not donate blood, organs, tissues, or semen until your follow-up is completed at 6 months  · PEP for HBV  may include HBV vaccinations or medicine to prevent HBV  This treatment works best if started within 24 hours of exposure  · PEP for HIV  may include 2 or 3 types of medicine to prevent HIV  This treatment works best if started within 72 hours of exposure  Continue treatment for 4 weeks  Practice safe sex to prevent spreading HIV and to prevent pregnancy during the follow-up period  If you are breastfeeding, your healthcare provider may recommend that you stop  Ask your healthcare provider if you can breastfeed  · PEP for HCV  is not  available  You will need to be tested for HCV and treated if you were infected  Follow up with your healthcare provider as directed: You will need more blood tests   You will also need to make sure your medicines are working  PEP for HIV often causes side effects  Talk with your healthcare provider about your symptoms  He will need to make sure you are taking the medicine correctly  Write down your questions so you remember to ask them during your visits  Prevent needle stick injuries:   · Always use gloves  when you handle needles that are exposed to blood or other body fluids  You may want to use 2 pairs of gloves for extra protection  · Do not recap needles after use  Recapping needles increases your risk for a needle stick  · Throw away needles in a safe container  A hard container with a lid may prevent accidental needle sticks  © 2017 [address] [employer] Information is for End User's use only and may not be sold, redistributed or otherwise used for commercial purposes  All illustrations and images included in CareNotes® are the copyrighted property of [employer] , [employer]  or Trevor Valverde  The above information is an  only  It is not intended as medical advice for individual conditions or treatments  Talk to your doctor, nurse or pharmacist before following any medical regimen to see if it is safe and effective for you

## 2018-12-10 NOTE — ED PROVIDER NOTES
History  Chief Complaint   Patient presents with    Body Fluid Exposure     Patient was drawing morning labs and upon removing needle from patient that needle did not go completely into the cap and patient stuck herself on left thumb     Patient is a 39-year-old female  Tetanus vaccination is up-to-date  Hepatitis-B vaccinations are likely up-to-date  Just prior to arrival patient has suffered an occupational accidental needlestick injury the left thumb  Superficial   Hollow needle  Needle was used for a blood draw  Unclear if there was visible blood  Source patient is here in the hospital   Low risk  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 Taking?    Coenzyme Q10 (COQ-10) 100 MG CAPS  Self Yes No   Sig: Take by mouth daily   Multiple Vitamin (DAILY VALUE MULTIVITAMIN) TABS  Self Yes No   Sig: Take 1 tablet by mouth daily   atorvastatin (LIPITOR) 20 mg tablet   No No   Sig: Take 1 tablet (20 mg total) by mouth daily at bedtime   hydrochlorothiazide (HYDRODIURIL) 25 mg tablet   No No   Sig: Take 1 tablet (25 mg total) by mouth daily   losartan (COZAAR) 25 mg tablet   No No   Sig: Take 1 tablet (25 mg total) by mouth daily   metroNIDAZOLE (METROGEL) 0 75 % gel  Self Yes No      Facility-Administered Medications: None       Past Medical History:   Diagnosis Date    Arthritis     Carpal tunnel syndrome     unspecified laterality    Cataract     Cerebral aneurysm     Decreased body height     last assessed 3/17/14    Heart murmur     Hypercholesteremia     Hypertension     Motion sickness     Obesity     Periodic heart flutter     Pneumonia     x1    PONV (postoperative nausea and vomiting)     Rosacea     Vertigo     Wears glasses     Wears partial dentures     upper       Past Surgical History:   Procedure Laterality Date    CARPAL TUNNEL RELEASE Bilateral      SECTION      x1    COLONOSCOPY      IR CEREBRAL ANGIOGRAPHY  10/12/2018    GA LAPAROSCOPY W TOT HYSTERECTUTERUS <=250 GRAM  W TUBE/OVARY N/A 2017    Procedure: HYSTERECTOMY LAPAROSCOPIC TOTAL, BSO, Cystoscopy;  Surgeon: Sadi Coleman MD;  [facility];  Service: Gynecology    GA REVISE MEDIAN N/CARPAL TUNNEL SURG Left 2016    Procedure: RELEASE CARPAL TUNNEL;  Surgeon: Albert Yao MD;  [facility];  Service: Orthopedics       Family History   Problem Relation Age of Onset    Arthritis Mother     Hypertension Mother     Stroke Mother         syndrome    Diabetes Father     Sudden death Father         instantaneous cardiac    Hyperlipidemia Sister     Sudden death Brother         instananeous cardiac     I have reviewed and agree with the history as documented  Social History   Substance Use Topics    Smoking status: Never Smoker    Smokeless tobacco: Never Used    Alcohol use No        Review of Systems   Skin: Positive for wound  Negative for pallor  Neurological: Negative for weakness and numbness  All other systems reviewed and are negative  Physical Exam  Physical Exam   Constitutional: She is oriented to person, place, and time  She appears well-developed and well-nourished  No distress  HENT:   Head: Normocephalic and atraumatic  Eyes: Conjunctivae are normal  Right eye exhibits no discharge  Left eye exhibits no discharge  Neck: Normal range of motion  Neck supple  Pulmonary/Chest: Effort normal  No respiratory distress  Musculoskeletal: Normal range of motion  She exhibits no deformity  There is a small puncture wound to the finger pad of the left thumb  Neurological: She is alert and oriented to person, place, and time  Skin: Skin is warm and dry  No rash noted  She is not diaphoretic  No erythema  No pallor     Psychiatric: She has a normal mood and affect  Her behavior is normal    Vitals reviewed  Vital Signs  ED Triage Vitals [12/10/18 0706]   Temperature Pulse Respirations Blood Pressure SpO2   97 5 °F (36 4 °C) 87 18 162/92 96 %      Temp Source Heart Rate Source Patient Position - Orthostatic VS BP Location FiO2 (%)   Temporal Monitor Sitting Left arm --      Pain Score       No Pain           Vitals:    12/10/18 0706   BP: 162/92   Pulse: 87   Patient Position - Orthostatic VS: Sitting       Visual Acuity      ED Medications  Medications - No data to display    Diagnostic Studies  Results Reviewed     Procedure Component Value Units Date/Time    HIV 1/2 AG-AB combo [[id number]] Collected:  12/10/18 4192    Lab Status: In process Specimen:  Blood from Hand, Right Updated:  12/10/18 0726    Hepatitis B surface antigen [[id number]] Collected:  12/10/18 0722    Lab Status: In process Specimen:  Blood from Hand, Right Updated:  12/10/18 0726    Hepatitis C antibody [[id number]] Collected:  12/10/18 5565    Lab Status: In process Specimen:  Blood from Hand, Right Updated:  12/10/18 0726    Hepatitis B surface antibody [[id number]] Collected:  12/10/18 0078    Lab Status: In process Specimen:  Blood from Hand, Right Updated:  12/10/18 0726    ALT [[id number]] Collected:  12/10/18 0722    Lab Status: In process Specimen:  Blood from Hand, Right Updated:  12/10/18 0726                 No orders to display              Procedures  Procedures       Phone Contacts  ED Phone Contact    ED Course                               MDM  Number of Diagnoses or Management Options  Needlestick injury accident with exposure to body fluid:   Diagnosis management comments: Rapid HIV test ordered on source patient  Hospitalist aware  At this time PEP not indicated  If rapid HIV test returns positive will start PEP  Tetanus up-to-date  Reviewed wound care instructions  Follow-up with Employee Health         Amount and/or Complexity of Data Reviewed  Clinical lab tests: ordered      CritCare Time    Disposition  Final diagnoses:   Needlestick injury accident with exposure to body fluid     Time reflects when diagnosis was documented in both MDM as applicable and the Disposition within this note     Time User Action Codes Description Comment    12/10/2018  7:13 AM Inés Funes Add [Z77 21,  W27  3XXA] Needlestick injury accident with exposure to body fluid       ED Disposition     ED Disposition Condition Comment    Discharge  Esther Cammie discharge to home/self care  Condition at discharge: Good        Follow-up Information     Follow up With Specialties Details Why [address] [facility]  In 1 day  [address] [place] [address]  [phone]          Patient's Medications   Discharge Prescriptions    No medications on file     No discharge procedures on file      ED Provider  Electronically Signed by           Sonam Arellano MD  12/10/18 7215

## 2018-12-10 NOTE — LETTER
December 10, 2018     Danelle Hilliard, [address] [facility]  [address]    Patient: Wayne Mensah   YOB: 1950   Date of Visit: 12/10/2018       Dear Dr Lillie Houston:    Thank you for referring Susi [place] to me for evaluation  Below are my notes for this consultation  If you have questions, please do not hesitate to call me  I look forward to following your patient along with you  Sincerely,        Niraj Glasgow MD        CC: MD Alton Roa MD Consuella Schwartz, DO Jaci Hue, MD Ladena Riff, MD Dempsey Ramal, MD  12/10/2018 12:13 PM  Sign at close encounter               Surgical Oncology Follow Up       [place] [address]  [address] [address]  [address] [place] [address]  1950  [id number]  [address]  [facility] [address]  [phone]    Chief Complaint   Patient presents with   [address] [place] [address] patient referral for abnormal mammogram        Assessment/Plan:    No problem-specific Assessment & Plan notes found for this encounter  Diagnoses and all orders for this visit:    Abnormal mammogram  -     US guided breast lymph node biopsy left; Future        Advance Care Planning/Advance Directives:  Discussed disease status, cancer treatment plans and/or cancer treatment goals with the patient  No history exists  History of Present Illness:   77-year-old woman who was in her usual state health  She underwent her annual mammogram and was found to have left axillary lymph node of undetermined significance  She is status post recent fall where she fractured 4 ribs on that side this past summer  She has no breast history  She has never had a breast biopsy    She does not perform breast exams on a regular basis   Menarche at 13  Six pregnancies, 6 live births  She was 19 her 1st child was born  She is not pregnant now  She has never used hormone replacement therapy  She had a TAHBSO in 2017  Review of Systems   Constitutional: Negative  HENT: Negative  Eyes: Negative  Respiratory: Negative  Cardiovascular: Negative  Gastrointestinal: Negative  Endocrine: Negative  Genitourinary: Negative  Musculoskeletal: Negative  Skin: Negative  Allergic/Immunologic: Negative  Neurological: Negative  Hematological: Negative  Psychiatric/Behavioral: Negative            Patient Active Problem List   Diagnosis    Carpal tunnel syndrome    Accelerated essential hypertension    Dyslipidemia    Class 2 obesity due to excess calories with body mass index (BMI) of 36 0 to 36 9 in adult    Aortic stenosis, mild    Complex endometrial hyperplasia    Fatigue    Female pelvic pain    Type 2 diabetes mellitus without complication, without long-term current use of insulin ([facility])    Insomnia    Osteopenia    Primary osteoarthritis of right knee    Snoring    Tendonitis of left hip    Thickened endometrium    Asymptomatic varicose veins    Vitamin D deficiency    Closed fracture of multiple ribs of left side with routine healing    Fall    Cerebral aneurysm    Left leg swelling    Abnormal electromyography    Numbness of hand    Hyperhidrosis    Grieving    Abnormal mammogram     Past Medical History:   Diagnosis Date    Arthritis     Carpal tunnel syndrome     unspecified laterality    Cataract     Cerebral aneurysm     Decreased body height     last assessed 3/17/14    Heart murmur     Hypercholesteremia     Hypertension     Motion sickness     Obesity     Periodic heart flutter     Pneumonia     x1    PONV (postoperative nausea and vomiting)     Rosacea     Vertigo     Wears glasses     Wears partial dentures     upper     Past Surgical History:   Procedure Laterality Date    CARPAL TUNNEL RELEASE Bilateral      SECTION      x1    COLONOSCOPY      IR CEREBRAL ANGIOGRAPHY  10/12/2018    NE LAPAROSCOPY W TOT HYSTERECTUTERUS <=250 GRAM  W TUBE/OVARY N/A 2017    Procedure: HYSTERECTOMY LAPAROSCOPIC TOTAL, BSO, Cystoscopy;  Surgeon: Kt Harden MD;  [facility];  Service: Gynecology    NE REVISE MEDIAN N/CARPAL TUNNEL SURG Left 2016    Procedure: RELEASE CARPAL TUNNEL;  Surgeon: Irving Pitt MD;  [facility];  Service: Orthopedics     Family History   Problem Relation Age of Onset    Arthritis Mother     Hypertension Mother     Stroke Mother         syndrome    Diabetes Father     Sudden death Father         instantaneous cardiac    Hyperlipidemia Sister     Colon cancer Sister 78    Sudden death Brother         instananeous cardiac    Hodgkin's lymphoma Child 21     Social History     Social History    Marital status: /Civil Union     Spouse name: N/A    Number of children: N/A    Years of education: N/A     Occupational History    Not on file       Social History Main Topics    Smoking status: Never Smoker    Smokeless tobacco: Never Used    Alcohol use No    Drug use: No    Sexual activity: Not on file     Other Topics Concern    Not on file     Social History Narrative    Stress at work           Current Outpatient Prescriptions:     atorvastatin (LIPITOR) 20 mg tablet, Take 1 tablet (20 mg total) by mouth daily at bedtime, Disp: 90 tablet, Rfl: 3    Coenzyme Q10 (COQ-10) 100 MG CAPS, Take by mouth daily, Disp: , Rfl:     hydrochlorothiazide (HYDRODIURIL) 25 mg tablet, Take 1 tablet (25 mg total) by mouth daily (Patient taking differently: Take 20 mg by mouth daily  ), Disp: 90 tablet, Rfl: 3    metroNIDAZOLE (METROGEL) 0 75 % gel, , Disp: , Rfl:     Multiple Vitamin (DAILY VALUE MULTIVITAMIN) TABS, Take 1 tablet by mouth daily, Disp: , Rfl:     losartan (COZAAR) 25 mg tablet, Take 1 tablet (25 mg total) by mouth daily (Patient not taking: Reported on 12/10/2018 ), Disp: 90 tablet, Rfl: 3  Allergies   Allergen Reactions    Penicillins Hives and Rash     Vitals:    12/10/18 1140   BP: 122/88   Pulse: 72   Resp: 16   Temp: 97 7 °F (36 5 °C)       Physical Exam   Constitutional: She is oriented to person, place, and time  She appears well-developed and well-nourished  HENT:   Head: Normocephalic and atraumatic  Right Ear: External ear normal    Left Ear: External ear normal    Eyes: Pupils are equal, round, and reactive to light  Conjunctivae are normal    Neck: Normal range of motion  Neck supple  Cardiovascular: Normal rate, regular rhythm, normal heart sounds and intact distal pulses  Pulmonary/Chest: Effort normal and breath sounds normal    Abdominal: Soft  Bowel sounds are normal    Musculoskeletal: Normal range of motion  Neurological: She is alert and oriented to person, place, and time  Skin: Skin is warm and dry  Breasts: breasts appear normal, no suspicious masses, no skin or nipple changes or axillary nodes  Psychiatric: She has a normal mood and affect  Her behavior is normal  Judgment and thought content normal        Pathology:  none    Labs:  none    Imaging  Us Breast Left Limited (diagnostic), Mammo Diagnostic Left W Cad    Result Date: 11/15/2018  Narrative: DIAGNOSIS: Abnormal mammogram RELEVANT HISTORY: Family Breast Cancer History: No known family history of breast cancer  Family Medical history: No relevant family history has been documented for this patient  Personal History: No relevant hormone history has been documented for this patient  No relevant surgical history has been documented for this patient  No relevant medical history has been documented for this patient   COMPARISONS: Compared to: 11/02/2018 Mammo screening bilateral w 3d & cad, 02/27/2017 Mammo screening bilateral w 3d & cad, 08/22/2014 Mammo screening bilateral w cad, 05/09/2011 MAMMO SCREENING BILATERAL W CAD, and 10/30/2007 MAMMO SCREENING BILATERAL W CAD INDICATION: Courtney Nuñez is a 77 y o  female presenting for r92 8  TECHNOLOGIST: Brenda Rm VIEWS: 2D views: No views of the breast(s) were taken  2D synth views: No views of the breast(s) were taken  3D views: No views of the breast(s) were taken  TISSUE DENSITY: There are scattered areas of fibroglandular density  RISK ASSESSMENT: 5 Year: Guerrero-Divya: 1 15 % 10 Year: Guerrero-Katieck: 2 45 % Lifetime: Guerrero-Katieck: 3 89 % FINDINGS: Mammography- Left upper-outer-far posterior mass projecting over the pectoral margin has a clear fatty hilum and morphology consistent with lymph node  However, the cortical margin of the node appears somewhat nodular/focally thickened  There is an asymmetric number of prominent left axillary nodes relative to the right  Questionable nodularity warranted further evaluation ultrasound  No parenchymal abnormalities warranting further workup with in the left breast, itself  No suspicious masses, architectural distortion, clustered microcalcifications, or abnormalities of the nipple or skin  Ultrasound- Left axillary nodes were evaluated sonographically  None are pathologically enlarged by size criteria  Most appear architecturally normal   However, one 1 6 cm x 1 3 cm node demonstrates cortical nodularity/focal cortical thickening up to 5 mm (series 1, image 5)  No jose calcifications are demonstrated  Impression:  1  Nonenlarged but morphologically abnormal left axillary lymph node with focal nodular cortical thickening  This finding is seen in a background of general asymmetry in number of left axillary lymph nodes  The patient does report recent history of left chest wall trauma which could account for increase number of left-sided nodes  However, cortical nodularity is a non-reassuring feature and warrants lymph node biopsy   2  No suspicious abnormalities within the breast parenchyma, itself  ASSESSMENT/BI-RADS CATEGORY: Left: 4A - Low Suspicion for Malignancy Overall: 4 - Suspicious RECOMMENDATION:      - Biopsy- US guided for the left breast  Workstation ID: [id number]    I reviewed the above laboratory and imaging data  Patient History:  Patient is postmenopausal   No known family history of cancer  Patient has never smoked  Patient's BMI is 38  4      Reason for exam: screening, asymptomatic  Screening     Mammo Screening Bilateral W DBT and CAD: November 2, 2018 - Check  In #: [de-identified]  2D/3D Procedure  3D views: Bilateral MLO view(s) were taken  2D views: Bilateral CC view(s) were taken         Technologist: RT Peggy(M)  Prior study comparison: February 27, 2017, mammo screening   bilateral W DBT and CAD [facility] at [facility]  [facility]  August 22, 2014, bilateral digital screening mammogram   performed at [address] [place] [address]  May 9, 2011,   bilateral digital screening mammogram performed at [address] [place] [address]  October 30, 2007, bilateral screening   mammogram performed at [address] [place] [address]  2001,   bilateral screening mammogram, performed at [facility]      There are scattered fibroglandular densities  There is a 1 2 cm   nodular asymmetry in the very posterior superior left breast,   superimposed over the pectoralis muscle, 14 cm deep to the nipple  and not seen on prior studies  I suspect this represents   nothing more than lymph node, not seen on prior studies due to   its very posterior location  However, I would recommend the   patient return for an axillary rotated craniocaudal view to   localize this presumed lymph node in a 2nd projection followed by  ultrasound to confirm that it is indeed nothing more than lymph   node      The parenchymal pattern is otherwise stable    No dominant soft   tissue mass, architectural distortion or suspicious   calcifications are noted in either breast   The skin and nipple   contours are within normal limits      IMPRESSION  1   1 2 cm nodular asymmetry very posterior superior left breast,  probable axillary lymph node  The patient should return for an   axillary rotated craniocaudal view to localize this presumed node  in a 2nd projection, followed by ultrasound, to confirm the   impression of axillary lymph node  2   Stable right mammogram      ACR BI-RADS® Assessments: BiRad:0 - Incomplete: needs additional   imaging evaluation - Left     Recommendation:  Further imaging of the left breast   A breast health care nurse from our facility will be contacting   the patient regarding the need for additional imaging      The patient is scheduled in a reminder system for screening   mammography      8-10% of cancers will be missed on mammography  Management of a   palpable abnormality must be based on clinical grounds  Patients   will be notified of their results via letter from our facility  Accredited by [employer] of [employer] and FDA      Transcription Location: [facility]: [id number]     Risk Value(s):  Tyrer-Cuzick 10 Year: 2 700%, Tyrer-Cuzick Lifetime: 4 200%,   Myriad Table: 1 5%, TRUDI 5 Year: 1 4%, NCI Lifetime: 4 1%    Discussion/Summary:  53-year-old woman with axillary lymph node and of undetermined significance  Will set up for ultrasound-guided biopsy  Follow-up here once results are available for review  All questions answered

## 2018-12-10 NOTE — PROGRESS NOTES
Surgical Oncology Follow Up       [facility]  [address]  [facility] [address] [facility] [id number]  [address]  1950  [phone]  [facility] Carol Hand  [address] [facility] [address]  [facility] [address] [place] [address]  [phone]    Chief Complaint   Patient presents with   [address] [place] [address] patient referral for abnormal mammogram        Assessment/Plan:    No problem-specific Assessment & Plan notes found for this encounter  Diagnoses and all orders for this visit:    Abnormal mammogram  -     US guided breast lymph node biopsy left; Future        Advance Care Planning/Advance Directives:  Discussed disease status, cancer treatment plans and/or cancer treatment goals with the patient  No history exists  History of Present Illness:   18-year-old woman who was in her usual state health  She underwent her annual mammogram and was found to have left axillary lymph node of undetermined significance  She is status post recent fall where she fractured 4 ribs on that side this past summer  She has no breast history  She has never had a breast biopsy  She does not perform breast exams on a regular basis  Menarche at 13  Six pregnancies, 6 live births  She was 19 her 1st child was born  She is not pregnant now  She has never used hormone replacement therapy  She had a TAHBSO in 2017  Review of Systems   Constitutional: Negative  HENT: Negative  Eyes: Negative  Respiratory: Negative  Cardiovascular: Negative  Gastrointestinal: Negative  Endocrine: Negative  Genitourinary: Negative  Musculoskeletal: Negative  Skin: Negative  Allergic/Immunologic: Negative  Neurological: Negative  Hematological: Negative  Psychiatric/Behavioral: Negative            Patient Active Problem List   Diagnosis    Carpal tunnel syndrome    Accelerated essential hypertension    Dyslipidemia    Class 2 obesity due to excess calories with body mass index (BMI) of 36 0 to 36 9 in adult    Aortic stenosis, mild    Complex endometrial hyperplasia    Fatigue    Female pelvic pain    Type 2 diabetes mellitus without complication, without long-term current use of insulin (HCC)    Insomnia    Osteopenia    Primary osteoarthritis of right knee    Snoring    Tendonitis of left hip    Thickened endometrium    Asymptomatic varicose veins    Vitamin D deficiency    Closed fracture of multiple ribs of left side with routine healing    Fall    Cerebral aneurysm    Left leg swelling    Abnormal electromyography    Numbness of hand    Hyperhidrosis    Grieving    Abnormal mammogram     Past Medical History:   Diagnosis Date    Arthritis     Carpal tunnel syndrome     unspecified laterality    Cataract     Cerebral aneurysm     Decreased body height     last assessed 3/17/14    Heart murmur     Hypercholesteremia     Hypertension     Motion sickness     Obesity     Periodic heart flutter     Pneumonia     x1    PONV (postoperative nausea and vomiting)     Rosacea     Vertigo     Wears glasses     Wears partial dentures     upper     Past Surgical History:   Procedure Laterality Date    CARPAL TUNNEL RELEASE Bilateral      SECTION      x1    COLONOSCOPY      IR CEREBRAL ANGIOGRAPHY  10/12/2018    CO LAPAROSCOPY W TOT HYSTERECTUTERUS <=250 GRAM  W TUBE/OVARY N/A 2017    Procedure: HYSTERECTOMY LAPAROSCOPIC TOTAL, BSO, Cystoscopy;  Surgeon: Jordan Covarrubias MD;  Location: [facility];  Service: Gynecology    CO REVISE MEDIAN N/CARPAL TUNNEL SURG Left 2016    Procedure: RELEASE CARPAL TUNNEL;  Surgeon: Silvano Castorena MD;  [facility];  Service: Orthopedics     Family History   Problem Relation Age of Onset    Arthritis Mother     Hypertension Mother     Stroke Mother         syndrome    Diabetes Father     Sudden death Father         instantaneous cardiac    Hyperlipidemia Sister     Colon cancer Sister 78    Sudden death Brother         instananeous cardiac    Hodgkin's lymphoma Child 21     Social History     Social History    Marital status: /Civil Union     Spouse name: N/A    Number of children: N/A    Years of education: N/A     Occupational History    Not on file  Social History Main Topics    Smoking status: Never Smoker    Smokeless tobacco: Never Used    Alcohol use No    Drug use: No    Sexual activity: Not on file     Other Topics Concern    Not on file     Social History Narrative    Stress at work           Current Outpatient Prescriptions:     atorvastatin (LIPITOR) 20 mg tablet, Take 1 tablet (20 mg total) by mouth daily at bedtime, Disp: 90 tablet, Rfl: 3    Coenzyme Q10 (COQ-10) 100 MG CAPS, Take by mouth daily, Disp: , Rfl:     hydrochlorothiazide (HYDRODIURIL) 25 mg tablet, Take 1 tablet (25 mg total) by mouth daily (Patient taking differently: Take 20 mg by mouth daily  ), Disp: 90 tablet, Rfl: 3    metroNIDAZOLE (METROGEL) 0 75 % gel, , Disp: , Rfl:     Multiple Vitamin (DAILY VALUE MULTIVITAMIN) TABS, Take 1 tablet by mouth daily, Disp: , Rfl:     losartan (COZAAR) 25 mg tablet, Take 1 tablet (25 mg total) by mouth daily (Patient not taking: Reported on 12/10/2018 ), Disp: 90 tablet, Rfl: 3  Allergies   Allergen Reactions    Penicillins Hives and Rash     Vitals:    12/10/18 1140   BP: 122/88   Pulse: 72   Resp: 16   Temp: 97 7 °F (36 5 °C)       Physical Exam   Constitutional: She is oriented to person, place, and time  She appears well-developed and well-nourished  HENT:   Head: Normocephalic and atraumatic  Right Ear: External ear normal    Left Ear: External ear normal    Eyes: Pupils are equal, round, and reactive to light  Conjunctivae are normal    Neck: Normal range of motion  Neck supple     Cardiovascular: Normal rate, regular rhythm, normal heart sounds and intact distal pulses  Pulmonary/Chest: Effort normal and breath sounds normal    Abdominal: Soft  Bowel sounds are normal    Musculoskeletal: Normal range of motion  Neurological: She is alert and oriented to person, place, and time  Skin: Skin is warm and dry  Breasts: breasts appear normal, no suspicious masses, no skin or nipple changes or axillary nodes  Psychiatric: She has a normal mood and affect  Her behavior is normal  Judgment and thought content normal        Pathology:  none    Labs:  none    Imaging  Us Breast Left Limited (diagnostic), Mammo Diagnostic Left W Cad    Result Date: 11/15/2018  Narrative: DIAGNOSIS: Abnormal mammogram RELEVANT HISTORY: Family Breast Cancer History: No known family history of breast cancer  Family Medical history: No relevant family history has been documented for this patient  Personal History: No relevant hormone history has been documented for this patient  No relevant surgical history has been documented for this patient  No relevant medical history has been documented for this patient  COMPARISONS: Compared to: 11/02/2018 Mammo screening bilateral w 3d & cad, 02/27/2017 Mammo screening bilateral w 3d & cad, 08/22/2014 Mammo screening bilateral w cad, 05/09/2011 MAMMO SCREENING BILATERAL W CAD, and 10/30/2007 MAMMO SCREENING BILATERAL W CAD INDICATION: Brionna Hussein is a 77 y o  female presenting for r92 8  TECHNOLOGIST: Luis Felipe Spears VIEWS: 2D views: No views of the breast(s) were taken  2D synth views: No views of the breast(s) were taken  3D views: No views of the breast(s) were taken  TISSUE DENSITY: There are scattered areas of fibroglandular density    RISK ASSESSMENT: 5 Year: Rosalieer-Katieck: 1 15 % 10 Year: Rosalieer-Cuzick: 2 45 % Lifetime: Rosalieer-Marileezick: 3 89 % FINDINGS: Mammography- Left upper-outer-far posterior mass projecting over the pectoral margin has a clear fatty hilum and morphology consistent with lymph node  However, the cortical margin of the node appears somewhat nodular/focally thickened  There is an asymmetric number of prominent left axillary nodes relative to the right  Questionable nodularity warranted further evaluation ultrasound  No parenchymal abnormalities warranting further workup with in the left breast, itself  No suspicious masses, architectural distortion, clustered microcalcifications, or abnormalities of the nipple or skin  Ultrasound- Left axillary nodes were evaluated sonographically  None are pathologically enlarged by size criteria  Most appear architecturally normal   However, one 1 6 cm x 1 3 cm node demonstrates cortical nodularity/focal cortical thickening up to 5 mm (series 1, image 5)  No jose calcifications are demonstrated  Impression:  1  Nonenlarged but morphologically abnormal left axillary lymph node with focal nodular cortical thickening  This finding is seen in a background of general asymmetry in number of left axillary lymph nodes  The patient does report recent history of left chest wall trauma which could account for increase number of left-sided nodes  However, cortical nodularity is a non-reassuring feature and warrants lymph node biopsy  2  No suspicious abnormalities within the breast parenchyma, itself  ASSESSMENT/BI-RADS CATEGORY: Left: 4A - Low Suspicion for Malignancy Overall: 4 - Suspicious RECOMMENDATION:      - Biopsy- US guided for the left breast  Workstation ID: [id number]    I reviewed the above laboratory and imaging data  Patient History:  Patient is postmenopausal   No known family history of cancer  Patient has never smoked  Patient's BMI is 38  4      Reason for exam: screening, asymptomatic  Screening     Mammo Screening Bilateral W DBT and CAD: November 2, 2018 - Check  In #: [de-identified]  2D/3D Procedure  3D views: Bilateral MLO view(s) were taken    2D views: Bilateral CC view(s) were taken         Technologist: Will RT Sadi(M)  Prior study comparison: February 27, 2017, mammo screening   bilateral W DBT and CAD performed at [address] [place] [address]  August 22, 2014, bilateral digital screening mammogram   performed at [address] [place] [address]  May 9, 2011,   bilateral digital screening mammogram performed at [address] [place] [address]  October 30, 2007, bilateral screening   mammogram performed at [address] [place] [address]  2001,   bilateral screening mammogram, performed at [facility]      There are scattered fibroglandular densities  There is a 1 2 cm   nodular asymmetry in the very posterior superior left breast,   superimposed over the pectoralis muscle, 14 cm deep to the nipple  and not seen on prior studies  I suspect this represents   nothing more than lymph node, not seen on prior studies due to   its very posterior location  However, I would recommend the   patient return for an axillary rotated craniocaudal view to   localize this presumed lymph node in a 2nd projection followed by  ultrasound to confirm that it is indeed nothing more than lymph   node      The parenchymal pattern is otherwise stable  No dominant soft   tissue mass, architectural distortion or suspicious   calcifications are noted in either breast   The skin and nipple   contours are within normal limits      IMPRESSION  1   1 2 cm nodular asymmetry very posterior superior left breast,  probable axillary lymph node  The patient should return for an   axillary rotated craniocaudal view to localize this presumed node  in a 2nd projection, followed by ultrasound, to confirm the   impression of axillary lymph node    2   Stable right mammogram      ACR BI-RADS® Assessments: BiRad:0 - Incomplete: needs additional   imaging evaluation - Left     Recommendation:  Further imaging of the left breast   A breast health care nurse from our facility will be contacting   the patient regarding the need for additional imaging      The patient is scheduled in a reminder system for screening   mammography      8-10% of cancers will be missed on mammography  Management of a   palpable abnormality must be based on clinical grounds  Patients   will be notified of their results via letter from our facility  Accredited by [employer] of [employer] and FDA      Transcription Location: [address] [place] [address]: [id number]     Risk Value(s):  Tyrer-Cuzick 10 Year: 2 700%, Tyrer-Cuzick Lifetime: 4 200%,   Myriad Table: 1 5%, TRUDI 5 Year: 1 4%, NCI Lifetime: 4 1%    Discussion/Summary:  47-year-old woman with axillary lymph node and of undetermined significance  Will set up for ultrasound-guided biopsy  Follow-up here once results are available for review  All questions answered

## 2018-12-12 LAB — HIV 1+2 AB+HIV1 P24 AG SERPL QL IA: NORMAL

## 2018-12-14 ENCOUNTER — TELEPHONE (OUTPATIENT)
Dept: INTERVENTIONAL RADIOLOGY/VASCULAR | Facility: HOSPITAL | Age: 68
End: 2018-12-14

## 2018-12-17 ENCOUNTER — HOSPITAL ENCOUNTER (OUTPATIENT)
Dept: INTERVENTIONAL RADIOLOGY/VASCULAR | Facility: HOSPITAL | Age: 68
Discharge: HOME | End: 2018-12-17
Attending: SURGERY | Admitting: RADIOLOGY
Payer: COMMERCIAL

## 2018-12-17 VITALS
SYSTOLIC BLOOD PRESSURE: 166 MMHG | OXYGEN SATURATION: 96 % | RESPIRATION RATE: 20 BRPM | TEMPERATURE: 97.3 F | HEART RATE: 81 BPM | DIASTOLIC BLOOD PRESSURE: 73 MMHG

## 2018-12-17 DIAGNOSIS — Q83.9 BREAST ANOMALY: ICD-10-CM

## 2018-12-17 PROCEDURE — 76882 US LMTD JT/FCL EVL NVASC XTR: CPT | Performed by: RADIOLOGY

## 2018-12-17 PROCEDURE — 76970 HB ULTRASOUND EXAM FOLLOW-UP: CPT

## 2018-12-17 RX ORDER — SODIUM CHLORIDE, SODIUM LACTATE, POTASSIUM CHLORIDE, CALCIUM CHLORIDE 600; 310; 30; 20 MG/100ML; MG/100ML; MG/100ML; MG/100ML
75 INJECTION, SOLUTION INTRAVENOUS CONTINUOUS
Status: DISCONTINUED | OUTPATIENT
Start: 2018-12-17 | End: 2018-12-21 | Stop reason: HOSPADM

## 2018-12-17 RX ADMIN — SODIUM CHLORIDE, SODIUM LACTATE, POTASSIUM CHLORIDE, AND CALCIUM CHLORIDE 75 ML/HR: .6; .31; .03; .02 INJECTION, SOLUTION INTRAVENOUS at 12:44

## 2018-12-17 NOTE — SEDATION DOCUMENTATION
Upon inspection, Dr Nestor López that there is not a present lymphnode to biopsy  Ultrasound utilized for this process  Michelle Caldera, Ultrasound Clinical Coordinator verified with Dr Ruma Hope that at this time there is no lymphnode to biopsy  Patient in agreement with care  Transported by RN to [facility] for discharge

## 2018-12-17 NOTE — DISCHARGE INSTRUCTIONS
POST BIOPSY    Care after your procedure:    1  Limit your activities for 24 hours after your biopsy  2  No driving day of biopsy  3  Return to your normal diet  Small sips of flat soda will help with mild nausea  4  Remove band-aid or dressing 24 hours after procedure  Contact Interventional Radiology at [phone] [place] PATIENTS: Contact Interventional Radiology at [phone]) Derrick Gonzales PATIENTS: Contact Interventional Radiology at [phone]) if:    1  Difficulty breathing, nausea or vomiting  2  Chills or fever above 101 degrees F      3  Pain at biopsy site not relieved by medication  4  Develop any redness, swelling, heat, unusual drainage, heavy bruising or bleeding from biopsy site

## 2018-12-30 ENCOUNTER — HOSPITAL ENCOUNTER (EMERGENCY)
Facility: HOSPITAL | Age: 68
Discharge: HOME | End: 2018-12-30
Attending: EMERGENCY MEDICINE
Payer: COMMERCIAL

## 2018-12-30 ENCOUNTER — APPOINTMENT (EMERGENCY)
Dept: CT IMAGING | Facility: HOSPITAL | Age: 68
End: 2018-12-30
Payer: COMMERCIAL

## 2018-12-30 VITALS
HEART RATE: 70 BPM | WEIGHT: 222 LBS | SYSTOLIC BLOOD PRESSURE: 137 MMHG | TEMPERATURE: 96.6 F | DIASTOLIC BLOOD PRESSURE: 76 MMHG | OXYGEN SATURATION: 97 % | BODY MASS INDEX: 39.33 KG/M2 | RESPIRATION RATE: 39 BRPM

## 2018-12-30 DIAGNOSIS — R42 DIZZINESS: Primary | ICD-10-CM

## 2018-12-30 DIAGNOSIS — I10 HYPERTENSION: ICD-10-CM

## 2018-12-30 DIAGNOSIS — I67.1 CEREBRAL ANEURYSM WITHOUT RUPTURE: ICD-10-CM

## 2018-12-30 LAB
ALBUMIN SERPL BCP-MCNC: 3.3 G/DL (ref 3.5–5)
ALP SERPL-CCNC: 119 U/L (ref 46–116)
ALT SERPL W P-5'-P-CCNC: 21 U/L (ref 12–78)
ANION GAP SERPL CALCULATED.3IONS-SCNC: 8 MMOL/L (ref 4–13)
APTT PPP: 32 SECONDS (ref 26–38)
AST SERPL W P-5'-P-CCNC: 13 U/L (ref 5–45)
BASOPHILS # BLD AUTO: 0.04 THOUSANDS/ΜL (ref 0–0.1)
BASOPHILS NFR BLD AUTO: 1 % (ref 0–1)
BILIRUB SERPL-MCNC: 0.2 MG/DL (ref 0.2–1)
BILIRUB UR QL STRIP: NEGATIVE
BUN SERPL-MCNC: 21 MG/DL (ref 5–25)
CALCIUM SERPL-MCNC: 8.9 MG/DL (ref 8.3–10.1)
CHLORIDE SERPL-SCNC: 105 MMOL/L (ref 100–108)
CLARITY UR: CLEAR
CO2 SERPL-SCNC: 27 MMOL/L (ref 21–32)
COLOR UR: YELLOW
CREAT SERPL-MCNC: 0.86 MG/DL (ref 0.6–1.3)
EOSINOPHIL # BLD AUTO: 0.25 THOUSAND/ΜL (ref 0–0.61)
EOSINOPHIL NFR BLD AUTO: 4 % (ref 0–6)
ERYTHROCYTE [DISTWIDTH] IN BLOOD BY AUTOMATED COUNT: 13.3 % (ref 11.6–15.1)
GFR SERPL CREATININE-BSD FRML MDRD: 69 ML/MIN/1.73SQ M
GLUCOSE SERPL-MCNC: 112 MG/DL (ref 65–140)
GLUCOSE UR STRIP-MCNC: NEGATIVE MG/DL
HCT VFR BLD AUTO: 37.5 % (ref 34.8–46.1)
HGB BLD-MCNC: 11.9 G/DL (ref 11.5–15.4)
HGB UR QL STRIP.AUTO: NEGATIVE
IMM GRANULOCYTES # BLD AUTO: 0.02 THOUSAND/UL (ref 0–0.2)
IMM GRANULOCYTES NFR BLD AUTO: 0 % (ref 0–2)
INR PPP: 1.05 (ref 0.86–1.17)
KETONES UR STRIP-MCNC: NEGATIVE MG/DL
LEUKOCYTE ESTERASE UR QL STRIP: NEGATIVE
LYMPHOCYTES # BLD AUTO: 1.92 THOUSANDS/ΜL (ref 0.6–4.47)
LYMPHOCYTES NFR BLD AUTO: 27 % (ref 14–44)
MAGNESIUM SERPL-MCNC: 2.1 MG/DL (ref 1.6–2.6)
MCH RBC QN AUTO: 29.3 PG (ref 26.8–34.3)
MCHC RBC AUTO-ENTMCNC: 31.7 G/DL (ref 31.4–37.4)
MCV RBC AUTO: 92 FL (ref 82–98)
MONOCYTES # BLD AUTO: 0.52 THOUSAND/ΜL (ref 0.17–1.22)
MONOCYTES NFR BLD AUTO: 7 % (ref 4–12)
NEUTROPHILS # BLD AUTO: 4.25 THOUSANDS/ΜL (ref 1.85–7.62)
NEUTS SEG NFR BLD AUTO: 61 % (ref 43–75)
NITRITE UR QL STRIP: NEGATIVE
NRBC BLD AUTO-RTO: 0 /100 WBCS
PH UR STRIP.AUTO: 7 [PH] (ref 4.5–8)
PLATELET # BLD AUTO: 271 THOUSANDS/UL (ref 149–390)
PMV BLD AUTO: 9.5 FL (ref 8.9–12.7)
POTASSIUM SERPL-SCNC: 3.6 MMOL/L (ref 3.5–5.3)
PROT SERPL-MCNC: 7.2 G/DL (ref 6.4–8.2)
PROT UR STRIP-MCNC: NEGATIVE MG/DL
PROTHROMBIN TIME: 13.2 SECONDS (ref 11.8–14.2)
RBC # BLD AUTO: 4.06 MILLION/UL (ref 3.81–5.12)
SODIUM SERPL-SCNC: 140 MMOL/L (ref 136–145)
SP GR UR STRIP.AUTO: 1.01 (ref 1–1.03)
TROPONIN I SERPL-MCNC: <0.02 NG/ML
UROBILINOGEN UR QL STRIP.AUTO: 0.2 E.U./DL
WBC # BLD AUTO: 7 THOUSAND/UL (ref 4.31–10.16)

## 2018-12-30 PROCEDURE — 96374 THER/PROPH/DIAG INJ IV PUSH: CPT

## 2018-12-30 PROCEDURE — 36415 COLL VENOUS BLD VENIPUNCTURE: CPT | Performed by: EMERGENCY MEDICINE

## 2018-12-30 PROCEDURE — 93005 ELECTROCARDIOGRAM TRACING: CPT

## 2018-12-30 PROCEDURE — 80053 COMPREHEN METABOLIC PANEL: CPT | Performed by: EMERGENCY MEDICINE

## 2018-12-30 PROCEDURE — 81003 URINALYSIS AUTO W/O SCOPE: CPT | Performed by: EMERGENCY MEDICINE

## 2018-12-30 PROCEDURE — 85610 PROTHROMBIN TIME: CPT | Performed by: EMERGENCY MEDICINE

## 2018-12-30 PROCEDURE — 70496 CT ANGIOGRAPHY HEAD: CPT

## 2018-12-30 PROCEDURE — 96375 TX/PRO/DX INJ NEW DRUG ADDON: CPT

## 2018-12-30 PROCEDURE — 70498 CT ANGIOGRAPHY NECK: CPT

## 2018-12-30 PROCEDURE — 85730 THROMBOPLASTIN TIME PARTIAL: CPT | Performed by: EMERGENCY MEDICINE

## 2018-12-30 PROCEDURE — 83735 ASSAY OF MAGNESIUM: CPT | Performed by: EMERGENCY MEDICINE

## 2018-12-30 PROCEDURE — 85025 COMPLETE CBC W/AUTO DIFF WBC: CPT | Performed by: EMERGENCY MEDICINE

## 2018-12-30 PROCEDURE — 84484 ASSAY OF TROPONIN QUANT: CPT | Performed by: EMERGENCY MEDICINE

## 2018-12-30 PROCEDURE — 4010F ACE/ARB THERAPY RXD/TAKEN: CPT | Performed by: FAMILY MEDICINE

## 2018-12-30 PROCEDURE — 99284 EMERGENCY DEPT VISIT MOD MDM: CPT

## 2018-12-30 RX ORDER — ENALAPRILAT 2.5 MG/2ML
1.25 INJECTION INTRAVENOUS ONCE
Status: COMPLETED | OUTPATIENT
Start: 2018-12-30 | End: 2018-12-30

## 2018-12-30 RX ORDER — LOSARTAN POTASSIUM 25 MG/1
25 TABLET ORAL ONCE
Status: DISCONTINUED | OUTPATIENT
Start: 2018-12-30 | End: 2018-12-30

## 2018-12-30 RX ORDER — DIPHENHYDRAMINE HYDROCHLORIDE 50 MG/ML
25 INJECTION INTRAMUSCULAR; INTRAVENOUS ONCE
Status: COMPLETED | OUTPATIENT
Start: 2018-12-30 | End: 2018-12-30

## 2018-12-30 RX ORDER — METOCLOPRAMIDE HYDROCHLORIDE 5 MG/ML
5 INJECTION INTRAMUSCULAR; INTRAVENOUS ONCE
Status: COMPLETED | OUTPATIENT
Start: 2018-12-30 | End: 2018-12-30

## 2018-12-30 RX ORDER — MECLIZINE HCL 12.5 MG/1
25 TABLET ORAL ONCE
Status: COMPLETED | OUTPATIENT
Start: 2018-12-30 | End: 2018-12-30

## 2018-12-30 RX ORDER — LOSARTAN POTASSIUM 50 MG/1
50 TABLET ORAL DAILY
Qty: 14 TABLET | Refills: 0 | Status: SHIPPED | OUTPATIENT
Start: 2018-12-30 | End: 2019-04-16 | Stop reason: SDUPTHER

## 2018-12-30 RX ORDER — ONDANSETRON 2 MG/ML
4 INJECTION INTRAMUSCULAR; INTRAVENOUS ONCE
Status: COMPLETED | OUTPATIENT
Start: 2018-12-30 | End: 2018-12-30

## 2018-12-30 RX ADMIN — ONDANSETRON HYDROCHLORIDE 4 MG: 2 INJECTION, SOLUTION INTRAMUSCULAR; INTRAVENOUS at 05:31

## 2018-12-30 RX ADMIN — METOCLOPRAMIDE 5 MG: 5 INJECTION, SOLUTION INTRAMUSCULAR; INTRAVENOUS at 06:45

## 2018-12-30 RX ADMIN — DIPHENHYDRAMINE HYDROCHLORIDE 25 MG: 50 INJECTION, SOLUTION INTRAMUSCULAR; INTRAVENOUS at 06:43

## 2018-12-30 RX ADMIN — IOHEXOL 85 ML: 350 INJECTION, SOLUTION INTRAVENOUS at 06:47

## 2018-12-30 RX ADMIN — ENALAPRILAT 1.25 MG: 1.25 INJECTION INTRAVENOUS at 06:47

## 2018-12-30 RX ADMIN — MECLIZINE 25 MG: 12.5 TABLET ORAL at 05:34

## 2018-12-30 NOTE — DISCHARGE INSTRUCTIONS
Dizziness   WHAT YOU NEED TO KNOW:   Dizziness is a feeling of being off balance or unsteady  Common causes of dizziness are an inner ear fluid imbalance or a lack of oxygen in your blood  Dizziness may be acute (lasts 3 days or less) or chronic (lasts longer than 3 days)  You may have dizzy spells that last from seconds to a few hours  DISCHARGE INSTRUCTIONS:   Return to the emergency department if:   · You have a headache and a stiff neck  · You have shaking chills and a fever  · You vomit over and over with no relief  · Your vomit or bowel movements are red or black  · You have pain in your chest, back, or abdomen  · You have numbness, especially in your face, arms, or legs  · You have trouble moving your arms or legs  · You are confused  Contact your healthcare provider if:   · You have a fever  · Your symptoms do not get better with treatment  · You have questions or concerns about your condition or care  Manage your symptoms:   · Do not drive  or operate heavy machinery when you are dizzy  · Get up slowly  from sitting or lying down  · Drink plenty of liquids  Liquids help prevent dehydration  Ask how much liquid to drink each day and which liquids are best for you  Follow up with your healthcare provider as directed:  Write down your questions so you remember to ask them during your visits  © 2017 [address]  Information is for End User's use only and may not be sold, redistributed or otherwise used for commercial purposes  All illustrations and images included in CareNotes® are the copyrighted property of A D A M [employer] Inc  or Trevor Valverde  The above information is an  only  It is not intended as medical advice for individual conditions or treatments  Talk to your doctor, nurse or pharmacist before following any medical regimen to see if it is safe and effective for you      No driving for 24 hours  Increase losartan to 50mg (2-25mg) tabs daily  Take blood pressure daily record and follow up with Dr Jaskaran Rodriguez  Be careful with position changes

## 2018-12-30 NOTE — ED NOTES
Returned from [place] [id number] reports that pt vomited post scan    Dr Cam Tabor aware     Erika Duffy RN  12/30/18 2055

## 2018-12-30 NOTE — ED NOTES
Patient transported to [address] via [address] [place] [address], [address] [place] [address]  12/30/18 4961

## 2018-12-30 NOTE — ED PROVIDER NOTES
History  Chief Complaint   Patient presents with    Dizziness     Pt recently dx w/cerebral aneurysm c/o acute onset of dizziness while at work     Patient is a 78-year-old female with a history of hypertension, arthritis, hyperlipidemia as well as a known cerebral artery aneurysm coming in today while at work with onset of dizziness headache  Patient with noted elevated blood pressure as well  Patient states she had a recent blood pressure medication changes were is unsure the name of the medication  This happened approximately 1 month ago  She has been compliant with her medications since then  She did not fall, tripped, hit her head  She has no tinnitus, vision changes, difficulty swallowing, confusion  She has never difficulty ambulating  He denies any weakness throughout the bilateral upper extremities and lower extremities  She denies any paresthesias throughout the bilateral upper extremities and lower extremities  She has any chest pain, palpitations, syncope, shortness of breath, diaphoresis  She typically takes her medications in the morning when she gets home after work  She was seen by neurosurgeon regarding this aneurysm and was told given the location that operation it is not feasible    Patient does follow with her family doctor regarding her blood pressures        History provided by:  Patient   used: No    Dizziness   Quality:  Unable to specify  Severity:  Moderate  Onset quality:  Sudden  Timing:  Intermittent  Progression:  Worsening  Chronicity:  New  Context: not when bending over, not with bowel movement, not with ear pain, not with eye movement, not with head movement, not with inactivity, not with loss of consciousness, not with medication, not with physical activity, not when standing up and not when urinating    Relieved by:  None tried  Worsened by:  Nothing  Ineffective treatments:  None tried  Associated symptoms: no blood in stool, no chest pain, no diarrhea, no headaches, no hearing loss, no nausea, no palpitations, no shortness of breath, no syncope, no tinnitus, no vision changes, no vomiting and no weakness    Risk factors: multiple medications and new medications    Risk factors: no anemia, no heart disease, no hx of stroke, no hx of vertigo and no Meniere's disease        Prior to Admission Medications   Prescriptions Last Dose Informant Patient Reported? Taking?    Coenzyme Q10 (COQ-10) 100 MG CAPS 2018 at Unknown time Self Yes Yes   Sig: Take by mouth daily   Multiple Vitamin (DAILY VALUE MULTIVITAMIN) TABS 2018 at Unknown time Self Yes Yes   Sig: Take 1 tablet by mouth daily   atorvastatin (LIPITOR) 20 mg tablet 2018 at Unknown time Self No Yes   Sig: Take 1 tablet (20 mg total) by mouth daily at bedtime   losartan (COZAAR) 25 mg tablet 2018 at Unknown time Self No Yes   Sig: Take 1 tablet (25 mg total) by mouth daily   metroNIDAZOLE (METROGEL) 0 75 % gel 2018 at Unknown time Self Yes Yes      Facility-Administered Medications: None       Past Medical History:   Diagnosis Date    Arthritis     Carpal tunnel syndrome     unspecified laterality    Cataract     Cerebral aneurysm     Decreased body height     last assessed 3/17/14    Heart murmur     Hypercholesteremia     Hypertension     Motion sickness     Obesity     Periodic heart flutter     Pneumonia     x1    PONV (postoperative nausea and vomiting)     Rosacea     Vertigo     Wears glasses     Wears partial dentures     upper       Past Surgical History:   Procedure Laterality Date    CARPAL TUNNEL RELEASE Bilateral      SECTION      x1    COLONOSCOPY      IR CEREBRAL ANGIOGRAPHY  10/12/2018    IR IMAGE GUIDED BIOPSY/ASPIRATION LYMPH NODE  2018    NH LAPAROSCOPY W TOT HYSTERECTUTERUS <=250 GRAM  W TUBE/OVARY N/A 2017    Procedure: HYSTERECTOMY LAPAROSCOPIC TOTAL, BSO, Cystoscopy;  Surgeon: Fabi Rosario MD;  [facility];  Service: Gynecology    OH REVISE MEDIAN N/CARPAL TUNNEL SURG Left 4/8/2016    Procedure: RELEASE CARPAL TUNNEL;  Surgeon: Liya Jain MD;  Location: [facility];  Service: Orthopedics       Family History   Problem Relation Age of Onset    Arthritis Mother     Hypertension Mother     Stroke Mother         syndrome    Diabetes Father     Sudden death Father         instantaneous cardiac    Hyperlipidemia Sister     Colon cancer Sister 78    Sudden death Brother         instananeous cardiac    Hodgkin's lymphoma Child 21     I have reviewed and agree with the history as documented  Social History   Substance Use Topics    Smoking status: Never Smoker    Smokeless tobacco: Never Used    Alcohol use No        Review of Systems   Constitutional: Negative for diaphoresis and fever  HENT: Negative for ear pain, hearing loss, sore throat and tinnitus  Eyes: Negative for visual disturbance  Respiratory: Negative for chest tightness and shortness of breath  Cardiovascular: Negative for chest pain, palpitations and syncope  Gastrointestinal: Negative for abdominal pain, blood in stool, diarrhea, nausea and vomiting  Genitourinary: Negative for difficulty urinating and dysuria  Musculoskeletal: Negative for back pain and neck pain  Skin: Negative for rash  Neurological: Positive for dizziness  Negative for weakness and headaches  Psychiatric/Behavioral: Negative for confusion  All other systems reviewed and are negative  Physical Exam  Physical Exam   Constitutional: She is oriented to person, place, and time  She appears well-developed and well-nourished  No distress  HENT:   Head: Normocephalic and atraumatic  Mouth/Throat: Oropharynx is clear and moist    Eyes: Pupils are equal, round, and reactive to light  Conjunctivae and EOM are normal    Neck: Normal range of motion  Neck supple     Cardiovascular: Normal rate, regular rhythm, normal heart sounds and intact distal pulses  No murmur heard  Pulmonary/Chest: Effort normal and breath sounds normal  No stridor  No respiratory distress  Abdominal: Soft  Bowel sounds are normal  She exhibits no distension  There is no tenderness  Musculoskeletal: Normal range of motion  She exhibits no edema  Neurological: She is alert and oriented to person, place, and time  She has normal strength  No cranial nerve deficit or sensory deficit  GCS eye subscore is 4  GCS verbal subscore is 5  GCS motor subscore is 6  Reflex Scores:       Patellar reflexes are 2+ on the right side and 2+ on the left side  Achilles reflexes are 2+ on the right side and 2+ on the left side  Negative pronator drift   Skin: Skin is warm  Capillary refill takes less than 2 seconds  She is not diaphoretic  Nursing note and vitals reviewed        Vital Signs  ED Triage Vitals   Temperature Pulse Respirations Blood Pressure SpO2   12/30/18 0455 12/30/18 0444 12/30/18 0444 12/30/18 0444 12/30/18 0444   (!) 96 6 °F (35 9 °C) 67 18 (!) 200/120 98 %      Temp Source Heart Rate Source Patient Position - Orthostatic VS BP Location FiO2 (%)   12/30/18 0444 12/30/18 0444 12/30/18 0444 12/30/18 0444 --   Temporal Monitor Standing - Orthostatic VS Left arm       Pain Score       12/30/18 0444       No Pain           Vitals:    12/30/18 0600 12/30/18 0635 12/30/18 0646 12/30/18 0700   BP: (!) 181/84 (!) 179/84  (!) 173/98   Pulse: 58 58 61 61   Patient Position - Orthostatic VS:           Visual Acuity      ED Medications  Medications   meclizine (ANTIVERT) tablet 25 mg (25 mg Oral Given 12/30/18 0534)   ondansetron (ZOFRAN) injection 4 mg (4 mg Intravenous Given 12/30/18 0531)   metoclopramide (REGLAN) injection 5 mg (5 mg Intravenous Given 12/30/18 0645)   diphenhydrAMINE (BENADRYL) injection 25 mg (25 mg Intravenous Given 12/30/18 0643)   enalaprilat (VASOTEC) injection 1 25 mg (1 25 mg Intravenous Given 12/30/18 0647)   iohexol (OMNIPAQUE) 350 MG/ML injection (SINGLE-DOSE) 85 mL (85 mL Intravenous Given 12/30/18 0647)       Diagnostic Studies  Results Reviewed     Procedure Component Value Units Date/Time    Comprehensive metabolic panel [[id number]]  (Abnormal) Collected:  12/30/18 0510    Lab Status:  Final result Specimen:  Blood from Arm, Right Updated:  12/30/18 0559     Sodium 140 mmol/L      Potassium 3 6 mmol/L      Chloride 105 mmol/L      CO2 27 mmol/L      ANION GAP 8 mmol/L      BUN 21 mg/dL      Creatinine 0 86 mg/dL      Glucose 112 mg/dL      Calcium 8 9 mg/dL      AST 13 U/L      ALT 21 U/L      Alkaline Phosphatase 119 (H) U/L      Total Protein 7 2 g/dL      Albumin 3 3 (L) g/dL      Total Bilirubin 0 20 mg/dL      eGFR 69 ml/min/1 73sq m     Narrative:         National Kidney Disease Education Program recommendations are as follows:  GFR calculation is accurate only with a steady state creatinine  Chronic Kidney disease less than 60 ml/min/1 73 sq  meters  Kidney failure less than 15 ml/min/1 73 sq  meters      Magnesium [[id number]]  (Normal) Collected:  12/30/18 0510    Lab Status:  Final result Specimen:  Blood from Arm, Right Updated:  12/30/18 0559     Magnesium 2 1 mg/dL     Troponin I [[id number]]  (Normal) Collected:  12/30/18 0510    Lab Status:  Final result Specimen:  Blood from Arm, Right Updated:  12/30/18 0556     Troponin I <0 02 ng/mL     Protime-INR [[id number]]  (Normal) Collected:  12/30/18 0510    Lab Status:  Final result Specimen:  Blood from Arm, Right Updated:  12/30/18 0549     Protime 13 2 seconds      INR 1 05    APTT [[id number]]  (Normal) Collected:  12/30/18 0510    Lab Status:  Final result Specimen:  Blood from Arm, Right Updated:  12/30/18 0549     PTT 32 seconds     CBC and differential [[id number]] Collected:  12/30/18 0510    Lab Status:  Final result Specimen:  Blood from Arm, Right Updated:  12/30/18 0537     WBC 7 00 Thousand/uL      RBC 4 06 Million/uL      Hemoglobin 11 9 g/dL      Hematocrit 37 5 %      MCV 92 fL      MCH 29 3 pg      MCHC 31 7 g/dL      RDW 13 3 %      MPV 9 5 fL      Platelets 617 Thousands/uL      nRBC 0 /100 WBCs      Neutrophils Relative 61 %      Immat GRANS % 0 %      Lymphocytes Relative 27 %      Monocytes Relative 7 %      Eosinophils Relative 4 %      Basophils Relative 1 %      Neutrophils Absolute 4 25 Thousands/µL      Immature Grans Absolute 0 02 Thousand/uL      Lymphocytes Absolute 1 92 Thousands/µL      Monocytes Absolute 0 52 Thousand/µL      Eosinophils Absolute 0 25 Thousand/µL      Basophils Absolute 0 04 Thousands/µL     UA w Reflex to Microscopic [[id number]]     Lab Status:  No result Specimen:  Urine                  CTA head and neck with and without contrast    (Results Pending)              Procedures  Procedures       Phone Contacts  ED Phone Contact    ED Course                   Stroke Assessment     Row Name 12/30/18 0454             NIH Stroke Scale    Interval Baseline      Level of Consciousness (1a ) 0      LOC Questions (1b ) 0      LOC Commands (1c ) 0      Best Gaze (2 ) 0      Visual (3 ) 0      Facial Palsy (4 ) 0      Motor Arm, Left (5a ) 0      Motor Arm, Right (5b ) 0      Motor Leg, Left (6a ) 0      Motor Leg, Right (6b ) 0      Limb Ataxia (7 ) 0      Sensory (8 ) 0      Best Language (9 ) 0      Dysarthria (10 ) 0      Extinction and Inattention (11 ) (Formerly Neglect) 0      Total 0                First Filed Value   TPA Decision  Patient not a TPA candidate  Patient is not a candidate options  comment [NIH 0  History of aneurysm]                        MDM  Number of Diagnoses or Management Options  Dizziness:   Hypertension:   Diagnosis management comments:   Patient is a 78-year-old female coming in today with dizziness and hypertension  Given her age and known aneurysm will complete cardiac workup as well as CT angio of head and neck  On exam she is neurologically intact with NIH is 0   Reviewed notes with patient patient was stating that 1 of her physicians that changed her medications  Reviewed multiple nodes in medications and epic and no noted changes  Patient states she will call her daughter later  I discussed with her at this stage will continue with plan of care  EKG INTERPRETATION 520 AM  RHYTHM:  Normal sinus rhythm at 60 beats per minute  AXIS:  Normal axis  INTERVALS:  AR interval measured at 192 millisecond  QRS COMPLEX:  Incomplete right bundle branch block  QRS measured 102 milliseconds  ST SEGMENT:  Nonspecific ST segment changes  T-wave flattening in aVL and V2  QT INTERVAL:  QTC measured at 424 milliseconds  COMPARED WITH PRIOR compared to old EKG on 7/4/2018   Interpretation by William Lares DO    Review note from Neurosurgery  Patient has a history of left MCA cerebral artery aneurysm  Reading the known discussing with patient, the risk of coiling and/or endovascular approach would be very difficult given its location and putting the patient at higher complication risk  It is noted there conservative management  Control blood pressure for systolic less than 056  And re CTA and July        6:00 AM  Patient with no evidence of end organ damage  Patient updated on this and states she feels min improvement  She states she is taking Losartan 25 mg daily   Will give this dose in the ER  Pending CT Angio  6:23 AM  Patient at CT angio    6:34 AM  Patient returned from CT and tech states patient vomited  Will give reglan and benadryl  Read vital and patient's blood pressure remains stable  Systolic is in the 851R  She states she felt nauseated and vomited  Will change losartan p o  To basal tech IV  Also give Reglan and Benadryl IV  NIH 0  Patient states she still feels dizzy like unsteady on her feet as she describes it    Will repeat EKG as well    EKG INTERPRETATION at 641AM  RHYTHM:  Sinus at 60 beats per minute  AXIS:  Normal axis  INTERVALS:  Pr interval measured at 222 consistent with a first-degree AV block  QRS COMPLEX:  QRS measured at 112 milliseconds  ST SEGMENT:  Nonspecific ST segment changes  Diffuse artifact  QT INTERVAL:  QTC measured 429 milliseconds  COMPARED WITH PRIOR new onset first-degree AV block compared to old  Interpretation by Jayson Waldron DO    7:04 AM  Patient was given medications  Will sign out day team for follow up CTA and re evaluation  Portions of the record may have been created with voice recognition software  Occasional wrong word or "sound a like" substitutions may have occurred due to the inherent limitations of voice recognition software  Read the chart carefully and recognize, using context, where substitutions have occurred  Amount and/or Complexity of Data Reviewed  Clinical lab tests: ordered and reviewed  Tests in the radiology section of CPT®: ordered and reviewed  Tests in the medicine section of CPT®: ordered and reviewed  Independent visualization of images, tracings, or specimens: yes      CritCare Time    Disposition  Final diagnoses:   Dizziness   Hypertension     Time reflects when diagnosis was documented in both MDM as applicable and the Disposition within this note     Time User Action Codes Description Comment    12/30/2018  5:01 AM Shira Lopez Shown L Add [R42] Dizziness     12/30/2018  5:01 AM Shira Lopez L Add [I10] Hypertension       ED Disposition     None      Follow-up Information    None         Patient's Medications   Discharge Prescriptions    No medications on file     No discharge procedures on file      ED Provider  Electronically Signed by           Staci Rayo DO  12/30/18 9066

## 2018-12-30 NOTE — ED NOTES
Nausea ceased but dizziness persists  Daughter at bedside   Given po fluids to drink to obtain urine specimen     Pat Teixeira RN  12/30/18 1938

## 2018-12-31 LAB
ATRIAL RATE: 58 BPM
ATRIAL RATE: 58 BPM
P AXIS: 25 DEGREES
P AXIS: 54 DEGREES
PR INTERVAL: 192 MS
PR INTERVAL: 222 MS
QRS AXIS: 57 DEGREES
QRS AXIS: 60 DEGREES
QRSD INTERVAL: 102 MS
QRSD INTERVAL: 112 MS
QT INTERVAL: 432 MS
QT INTERVAL: 438 MS
QTC INTERVAL: 424 MS
QTC INTERVAL: 429 MS
T WAVE AXIS: 53 DEGREES
T WAVE AXIS: 54 DEGREES
VENTRICULAR RATE: 58 BPM
VENTRICULAR RATE: 58 BPM

## 2018-12-31 PROCEDURE — 93010 ELECTROCARDIOGRAM REPORT: CPT | Performed by: INTERNAL MEDICINE

## 2019-01-08 ENCOUNTER — OFFICE VISIT (OUTPATIENT)
Dept: INTERNAL MEDICINE CLINIC | Facility: CLINIC | Age: 69
End: 2019-01-08
Payer: COMMERCIAL

## 2019-01-08 VITALS
SYSTOLIC BLOOD PRESSURE: 130 MMHG | DIASTOLIC BLOOD PRESSURE: 78 MMHG | HEIGHT: 63 IN | WEIGHT: 219 LBS | HEART RATE: 71 BPM | OXYGEN SATURATION: 97 % | TEMPERATURE: 98.2 F | BODY MASS INDEX: 38.8 KG/M2

## 2019-01-08 DIAGNOSIS — I10 ACCELERATED ESSENTIAL HYPERTENSION: Primary | ICD-10-CM

## 2019-01-08 DIAGNOSIS — F41.9 ANXIETY: ICD-10-CM

## 2019-01-08 PROCEDURE — 1160F RVW MEDS BY RX/DR IN RCRD: CPT | Performed by: FAMILY MEDICINE

## 2019-01-08 PROCEDURE — 3008F BODY MASS INDEX DOCD: CPT | Performed by: FAMILY MEDICINE

## 2019-01-08 PROCEDURE — 3078F DIAST BP <80 MM HG: CPT | Performed by: FAMILY MEDICINE

## 2019-01-08 PROCEDURE — 3075F SYST BP GE 130 - 139MM HG: CPT | Performed by: FAMILY MEDICINE

## 2019-01-08 PROCEDURE — 99213 OFFICE O/P EST LOW 20 MIN: CPT | Performed by: FAMILY MEDICINE

## 2019-01-08 RX ORDER — LORAZEPAM 0.5 MG/1
0.5 TABLET ORAL EVERY 8 HOURS PRN
Qty: 60 TABLET | Refills: 0 | Status: SHIPPED | OUTPATIENT
Start: 2019-01-08 | End: 2020-09-23 | Stop reason: ALTCHOICE

## 2019-01-08 NOTE — PROGRESS NOTES
Assessment/Plan:    No problem-specific Assessment & Plan notes found for this encounter  Diagnoses and all orders for this visit:    Accelerated essential hypertension    Anxiety  -     LORazepam (ATIVAN) 0 5 mg tablet; Take 1 tablet (0 5 mg total) by mouth every 8 (eight) hours as needed for anxiety        Orders recommendations as noted above  Blood pressure is much better controlled at present  Continue with the losartan 50 mg on a daily basis  Follow blood pressures at home  Dose of the losartan may need to be increased in the future if blood pressure elevates again  Some of this may be related to stress  Has been under a lot more anxiety both at work but especially at home  Discussed options for treatment of anxiety  She wishes to avoid taking anything on a daily basis  Will give her the lorazepam to take as needed  Potential risks for the medication discussed  Advised her to give our office a call in about 2-3 weeks to give us an update  Subjective:      Patient ID: Itzel Galdamez is a 77 y o  female  She presents for follow-up after recent emergency room visit  She was at work  She was on 1-1 with the patient  She got up and had sudden onset of dizziness  She thought it was related to benign positional vertigo which she has had in the past   She was going to just go home and rest but they had checked her blood pressure was significantly elevated  Her coworkers took her to the emergency room her blood pressure was significantly elevated at 200/120  She had laboratory testing done as well as CT of the head which was stable  There was no change in the aneurysm  Did not have any headaches  They had given her medications through the IV, enalapril, and her blood pressure had come down  Symptoms had resolved  Has been trying to follow blood pressure at home    Has been better controlled with the higher dose of losartan at 50 mg on a daily basis which is what they increased her to through the emergency room  Has been under a lot more stress  Has issues with her  at home  Some issues at work as well  Some financial issues and concerns  Has difficulty relaxing  Denies any chest pain or palpitations  Denies any significant shortness of breath  The following portions of the patient's history were reviewed and updated as appropriate:   She  has a past medical history of Arthritis; Carpal tunnel syndrome; Cataract; Cerebral aneurysm; Decreased body height; Heart murmur; Hypercholesteremia; Hypertension; Motion sickness; Obesity; Periodic heart flutter; Pneumonia; PONV (postoperative nausea and vomiting); Rosacea; Vertigo; Wears glasses; and Wears partial dentures  She   Patient Active Problem List    Diagnosis Date Noted    Anxiety 2019    Abnormal mammogram 2018    Hyperhidrosis 2018    Grieving 2018    Abnormal electromyography 2018    Numbness of hand 2018    Left leg swelling 2018    Closed fracture of multiple ribs of left side with routine healing 2018    Fall 2018    Cerebral aneurysm 2018    Aortic stenosis, mild 2018    Complex endometrial hyperplasia 2017    Thickened endometrium 2017    Female pelvic pain 2017    Insomnia 2017    Primary osteoarthritis of right knee 2016    Carpal tunnel syndrome 2016    Tendonitis of left hip 2015    Vitamin D deficiency 2015    Type 2 diabetes mellitus without complication, without long-term current use of insulin ([facility] [address] ) 2015    Dyslipidemia 2014    Snoring 2014    Osteopenia 2014    Accelerated essential hypertension 2013    Class 2 obesity due to excess calories with body mass index (BMI) of 36 0 to 36 9 in adult 2013    Fatigue 2012    Asymptomatic varicose veins 2012     She  has a past surgical history that includes Colonoscopy;   section; pr revise median n/carpal tunnel surg (Left, 4/8/2016); Carpal tunnel release (Bilateral); pr laparoscopy w tot hysterectuterus <=250 gram  w tube/ovary (N/A, 8/28/2017); IR cerebral angiography (10/12/2018); and IR image guided biopsy/aspiration lymph node (12/17/2018)  Her family history includes Arthritis in her mother; Colon cancer (age of onset: 78) in her sister; Diabetes in her father; Hodgkin's lymphoma (age of onset: 24) in her child; Hyperlipidemia in her sister; Hypertension in her mother; Stroke in her mother; Sudden death in her brother and father  She  reports that she has never smoked  She has never used smokeless tobacco  She reports that she does not drink alcohol or use drugs  Current Outpatient Prescriptions   Medication Sig Dispense Refill    atorvastatin (LIPITOR) 20 mg tablet Take 1 tablet (20 mg total) by mouth daily at bedtime 90 tablet 3    Coenzyme Q10 (COQ-10) 100 MG CAPS Take by mouth daily      losartan (COZAAR) 50 mg tablet Take 1 tablet (50 mg total) by mouth daily 14 tablet 0    metroNIDAZOLE (METROGEL) 0 75 % gel       Multiple Vitamin (DAILY VALUE MULTIVITAMIN) TABS Take 1 tablet by mouth daily      LORazepam (ATIVAN) 0 5 mg tablet Take 1 tablet (0 5 mg total) by mouth every 8 (eight) hours as needed for anxiety 60 tablet 0     No current facility-administered medications for this visit        Current Outpatient Prescriptions on File Prior to Visit   Medication Sig    atorvastatin (LIPITOR) 20 mg tablet Take 1 tablet (20 mg total) by mouth daily at bedtime    Coenzyme Q10 (COQ-10) 100 MG CAPS Take by mouth daily    losartan (COZAAR) 50 mg tablet Take 1 tablet (50 mg total) by mouth daily    metroNIDAZOLE (METROGEL) 0 75 % gel     Multiple Vitamin (DAILY VALUE MULTIVITAMIN) TABS Take 1 tablet by mouth daily    [DISCONTINUED] losartan (COZAAR) 25 mg tablet Take 1 tablet (25 mg total) by mouth daily     No current facility-administered medications on file prior to visit  She is allergic to penicillins       Review of Systems   Constitutional: Positive for fatigue  Negative for activity change, appetite change, chills and fever  HENT: Negative for congestion  Eyes: Positive for visual disturbance  Respiratory: Negative for shortness of breath  Cardiovascular: Negative for chest pain and palpitations  Gastrointestinal: Negative for abdominal pain  Musculoskeletal: Positive for arthralgias  Neurological: Positive for dizziness  Negative for headaches  As per HPI   Psychiatric/Behavioral: Positive for decreased concentration and sleep disturbance  The patient is nervous/anxious  Objective:      /78 (BP Location: Left arm, Patient Position: Sitting, Cuff Size: Large)   Pulse 71   Temp 98 2 °F (36 8 °C) (Temporal)   Ht 5' 3" (1 6 m)   Wt 99 3 kg (219 lb)   LMP  (LMP Unknown)   SpO2 97%   BMI 38 79 kg/m²          Physical Exam   Constitutional: She is cooperative  HENT:   Head: Normocephalic and atraumatic  Moist mucous membranes   Eyes: Pupils are equal, round, and reactive to light  Conjunctivae are normal    Neck: Carotid bruit is not present  Cardiovascular: Normal rate and regular rhythm  Pulmonary/Chest: She has no decreased breath sounds  She has no wheezes  She has no rhonchi  Lymphadenopathy:     She has no axillary adenopathy  Neurological: She is alert  No cranial nerve deficit or sensory deficit  Gait normal    Psychiatric: Her mood appears anxious  Nursing note and vitals reviewed

## 2019-01-16 ENCOUNTER — OFFICE VISIT (OUTPATIENT)
Dept: SURGICAL ONCOLOGY | Facility: HOSPITAL | Age: 69
End: 2019-01-16
Payer: COMMERCIAL

## 2019-01-16 VITALS
TEMPERATURE: 97.8 F | DIASTOLIC BLOOD PRESSURE: 90 MMHG | WEIGHT: 218 LBS | HEIGHT: 63 IN | RESPIRATION RATE: 16 BRPM | HEART RATE: 78 BPM | BODY MASS INDEX: 38.62 KG/M2 | SYSTOLIC BLOOD PRESSURE: 138 MMHG

## 2019-01-16 DIAGNOSIS — R92.8 ABNORMAL MAMMOGRAM: Primary | ICD-10-CM

## 2019-01-16 PROCEDURE — 99213 OFFICE O/P EST LOW 20 MIN: CPT | Performed by: SURGERY

## 2019-01-16 NOTE — PROGRESS NOTES
Surgical Oncology Follow Up       [place]  [address]  [place] [address]    Angelica Spindle  1950  [id number]  [place] [address]    Chief Complaint   Patient presents with    Follow-up     Patient is here for follow up after IR biopsy of lymph node  Assessment/Plan:    No problem-specific Assessment & Plan notes found for this encounter  Diagnoses and all orders for this visit:    Abnormal mammogram  -     US breast left limited (diagnostic); Future        Advance Care Planning/Advance Directives:  Discussed disease status, cancer treatment plans and/or cancer treatment goals with the patient  No history exists  History of Present Illness:  Patient is a 68-year-old woman here status post recent left ultrasound, with attempted biopsy  She was noted on mammogram to have an enlarged lymph node  -Interval History:  No issues or complaints with regards to breast axilla since procedure was planned  Review of Systems:  Review of Systems   Musculoskeletal: Positive for arthralgias  All other systems reviewed and are negative        Patient Active Problem List   Diagnosis    Carpal tunnel syndrome    Accelerated essential hypertension    Dyslipidemia    Class 2 obesity due to excess calories with body mass index (BMI) of 36 0 to 36 9 in adult    Aortic stenosis, mild    Complex endometrial hyperplasia    Fatigue    Female pelvic pain    Type 2 diabetes mellitus without complication, without long-term current use of insulin (HCC)    Insomnia    Osteopenia    Primary osteoarthritis of right knee    Snoring    Tendonitis of left hip    Thickened endometrium    Asymptomatic varicose veins    Vitamin D deficiency    Closed fracture of multiple ribs of left side with routine healing    Fall    Cerebral aneurysm    Left leg swelling    Abnormal electromyography    Numbness of hand    Hyperhidrosis    Grieving    Abnormal mammogram    Anxiety     Past Medical History:   Diagnosis Date    Arthritis     Carpal tunnel syndrome     unspecified laterality    Cataract     Cerebral aneurysm     Decreased body height     last assessed 3/17/14    Heart murmur     Hypercholesteremia     Hypertension     Motion sickness     Obesity     Periodic heart flutter     Pneumonia     x1    PONV (postoperative nausea and vomiting)     Rosacea     Vertigo     Wears glasses     Wears partial dentures     upper     Past Surgical History:   Procedure Laterality Date    CARPAL TUNNEL RELEASE Bilateral      SECTION      x1    COLONOSCOPY      IR CEREBRAL ANGIOGRAPHY  10/12/2018    IR IMAGE GUIDED BIOPSY/ASPIRATION LYMPH NODE  2018    [place] LAPAROSCOPY W TOT HYSTERECTUTERUS <=250 GRAM  W TUBE/OVARY N/A 2017    Procedure: HYSTERECTOMY LAPAROSCOPIC TOTAL, BSO, Cystoscopy;  Surgeon: Diana Green MD;  [facility];  Service: Gynecology    [place] REVISE MEDIAN N/CARPAL TUNNEL SURG Left 2016    Procedure: RELEASE CARPAL TUNNEL;  Surgeon: Alexandria Tate MD;  [facility];  Service: Orthopedics     Family History   Problem Relation Age of Onset    Arthritis Mother     Hypertension Mother     Stroke Mother         syndrome    Diabetes Father     Sudden death Father         instantaneous cardiac    Hyperlipidemia Sister     Colon cancer Sister 78    Sudden death Brother         instananeous cardiac    Hodgkin's lymphoma Child 21     Social History     Social History    Marital status: /Civil Union     Spouse name: N/A    Number of children: N/A    Years of education: N/A     Occupational History    Not on file       Social History Main Topics    Smoking status: Never Smoker    Smokeless tobacco: Never Used    Alcohol use No    Drug use: No    Sexual activity: Not on file     Other Topics Concern    Not on file     Social History Narrative    Stress at work           Current Outpatient Prescriptions:     atorvastatin (LIPITOR) 20 mg tablet, Take 1 tablet (20 mg total) by mouth daily at bedtime, Disp: 90 tablet, Rfl: 3    Coenzyme Q10 (COQ-10) 100 MG CAPS, Take by mouth daily, Disp: , Rfl:     losartan (COZAAR) 50 mg tablet, Take 1 tablet (50 mg total) by mouth daily, Disp: 14 tablet, Rfl: 0    Multiple Vitamin (DAILY VALUE MULTIVITAMIN) TABS, Take 1 tablet by mouth daily, Disp: , Rfl:     LORazepam (ATIVAN) 0 5 mg tablet, Take 1 tablet (0 5 mg total) by mouth every 8 (eight) hours as needed for anxiety (Patient not taking: Reported on 1/16/2019 ), Disp: 60 tablet, Rfl: 0    metroNIDAZOLE (METROGEL) 0 75 % gel, , Disp: , Rfl:   Allergies   Allergen Reactions    Penicillins Hives and Rash     Vitals:    01/16/19 1125   BP: 138/90   Pulse: 78   Resp: 16   Temp: 97 8 °F (36 6 °C)       Physical Exam   Skin:   Axillae clinically negative, no palpable adenopathy         Results:  Labs:  CBC, Coags, BMP, Mg, Phos     Imaging    IR image guided biopsy/aspiration lymph node   Status: Final result   PACS Images     Show images for IR image guided biopsy/aspiration lymph node   Order Report      Order Details   Order Questions     Question Answer Comment   Exam reason Left sided breast lymphnode biopsy    Note:  Enter reason for exam          Reason For Exam     Left sided breast lymphnode biopsy   Dx: Breast anomaly [Q83 9 (ICD-10-CM)]   Study Result     Examination: Ultrasound-guided biopsy of the left axillary lymph node, not performed     HISTORY: Recent trauma  Underwent mammogram   Shown to have lymph nodes in the axilla  These were confirmed on ultrasound      TECHNIQUE: The patient was brought to Radiology  Informed consent was obtained  Ultrasound was performed of the left axilla    No nodes are identified     FINDINGS: No abnormality  Note target for biopsy     IMPRESSION:  Biopsy deferred                 Workstation performed: [id number]       Cta Head And Neck With And Without Contrast    Result Date: 12/30/2018  Narrative: CTA NECK AND BRAIN WITH AND WITHOUT CONTRAST INDICATION: history of aneurysm  Coming in with headache and dizziness COMPARISON:   10/12/2018, cerebral angiogram   CT angiogram dated 7/5/2018  TECHNIQUE:  Routine CT imaging of the Brain without contrast   Post contrast imaging was performed after administration of iodinated contrast through the neck and brain  Post contrast axial 0 625 mm images timed to opacify the arterial system  3D rendering was performed on an independent workstation  MIP reconstructions performed  Coronal reconstructions were performed of the noncontrast portion of the brain  Radiation dose length product (DLP) for this visit:  1278 74 mGy-cm   This examination, like all CT scans performed in the [facility], was performed utilizing techniques to minimize radiation dose exposure, including the use of iterative reconstruction and automated exposure control  IV Contrast:  85 mL of iohexol (OMNIPAQUE)  IMAGE QUALITY:   Diagnostic FINDINGS: NONCONTRAST BRAIN PARENCHYMA: Decreased attenuation is noted in periventricular and subcortical white matter demonstrating an appearance that is statistically most likely to represent mild microangiopathic change  No CT signs of acute infarction  No intracranial mass, mass effect or midline shift  No acute parenchymal hemorrhage  Noncontrast imaging of the vasculature again demonstrates an aneurysm of the distal left M1 segment, series 2 image 16  VENTRICLES AND EXTRA-AXIAL SPACES:  Normal for the patient's age  VISUALIZED ORBITS AND PARANASAL SINUSES:  Unremarkable  CERVICAL VASCULATURE AORTIC ARCH AND GREAT VESSELS:  Normal aortic arch and great vessel origins  Normal visualized subclavian vessels   RIGHT VERTEBRAL ARTERY CERVICAL SEGMENT:  Normal origin  The vessel is normal in caliber throughout the neck  LEFT VERTEBRAL ARTERY CERVICAL SEGMENT:  Normal origin  The vessel is normal in caliber throughout the neck  RIGHT EXTRACRANIAL CAROTID SEGMENT:  Normal caliber common carotid artery  Normal bifurcation and cervical internal carotid artery  No stenosis or dissection  LEFT EXTRACRANIAL CAROTID SEGMENT:  Normal caliber common carotid artery  Normal bifurcation and cervical internal carotid artery  No stenosis or dissection  NASCET criteria was used to determine the degree of internal carotid artery diameter stenosis  INTRACRANIAL VASCULATURE INTERNAL CAROTID ARTERIES:  Normal enhancement of the intracranial portions of the internal carotid arteries  Normal ophthalmic artery origins  Normal ICA terminus  ANTERIOR CIRCULATION:  Symmetric A1 segments and anterior cerebral arteries with normal enhancement  Normal anterior communicating artery  MIDDLE CEREBRAL ARTERY CIRCULATION:  Normal right M1 segment and middle cerebral artery branches  Left M1 segment demonstrates no stenosis  Once again identified is a lobulated 6 mm M1 bifurcation aneurysm  Stable M2 branches  The 2 largest branches appear to arise directly from the aneurysm  DISTAL VERTEBRAL ARTERIES:  Normal distal vertebral arteries  Posterior inferior cerebellar artery origins are normal  Normal vertebral basilar junction  BASILAR ARTERY:  Basilar artery is normal in caliber  Normal superior cerebellar arteries  POSTERIOR CEREBRAL ARTERIES: There is fetal origin of the right posterior cerebral artery  The left posterior cerebral artery arises from the basilar tip  Both demonstrate no focal stenosis  Hypoplastic right P1 segment  Tiny left posterior communicating artery present  DURAL VENOUS SINUSES:  Normal  NON VASCULAR ANATOMY BONY STRUCTURES:  No acute osseous abnormality  SOFT TISSUES OF THE NECK:  Normal  THORACIC INLET:  Unremarkable       Impression: Stable CT of the brain  Mild chronic microangiopathic change  No subarachnoid hemorrhage  Stable 6 mm left M1 bifurcation aneurysm  Workstation performed: [id number]     Ir Image Guided Biopsy/aspiration Lymph Node    Result Date: 12/21/2018  Narrative: Examination: Ultrasound-guided biopsy of the left axillary lymph node, not performed HISTORY: Recent trauma  Underwent mammogram   Shown to have lymph nodes in the axilla  These were confirmed on ultrasound  TECHNIQUE: The patient was brought to Radiology  Informed consent was obtained  Ultrasound was performed of the left axilla  No nodes are identified FINDINGS: No abnormality  Note target for biopsy     Impression: Biopsy deferred Workstation performed: [id number]     Radiology Results    Result Date: 12/31/2018  Narrative: Ordered by an unspecified provider  I reviewed the above laboratory and imaging data  Discussion/Summary:  39-year-old woman with incidentally found enlarged axillary lymph nodes seen incidentally on mammogram   Large size merited image guided biopsy  At time of planned procedure, biopsy was deferred given that adenopathy was no longer visualized  This is more suggestive of an inflammatory process rather than malignancy  Therefore plan on 3 month follow-up with surveillance ultrasound at that time

## 2019-01-16 NOTE — LETTER
January 16, 2019     Soheila Boykin, [address]    Patient: Brandon Gilliam   YOB: 1950   Date of Visit: 1/16/2019       Dear Dr Vipul Pina:    Thank you for referring Elsa Morales to me for evaluation  Below are my notes for this consultation  If you have questions, please do not hesitate to call me  I look forward to following your patient along with you  Sincerely,        Danna Cristobal MD        CC: MD Marco Antonio Guerrero MD Butler Ports, DO Marshall Lah, MD Jannell Sorenson, MD Aura Bald, MD  1/16/2019 12:12 PM  Sign at close encounter     Surgical Oncology Follow Up       [address] [address]    Brandon Gilliam  1950  [id number]  [facility] [address]  [facility] [address]    Chief Complaint   Patient presents with    Follow-up     Patient is here for follow up after IR biopsy of lymph node  Assessment/Plan:    No problem-specific Assessment & Plan notes found for this encounter  Diagnoses and all orders for this visit:    Abnormal mammogram  -     US breast left limited (diagnostic); Future        Advance Care Planning/Advance Directives:  Discussed disease status, cancer treatment plans and/or cancer treatment goals with the patient  No history exists  History of Present Illness:  Patient is a 75-year-old woman here status post recent left ultrasound, with attempted biopsy  She was noted on mammogram to have an enlarged lymph node  -Interval History:  No issues or complaints with regards to breast axilla since procedure was planned  Review of Systems:  Review of Systems   Musculoskeletal: Positive for arthralgias     All other systems reviewed and are negative        Patient Active Problem List   Diagnosis    Carpal tunnel syndrome    Accelerated essential hypertension    Dyslipidemia    Class 2 obesity due to excess calories with body mass index (BMI) of 36 0 to 36 9 in adult    Aortic stenosis, mild    Complex endometrial hyperplasia    Fatigue    Female pelvic pain    Type 2 diabetes mellitus without complication, without long-term current use of insulin (HCC)    Insomnia    Osteopenia    Primary osteoarthritis of right knee    Snoring    Tendonitis of left hip    Thickened endometrium    Asymptomatic varicose veins    Vitamin D deficiency    Closed fracture of multiple ribs of left side with routine healing    Fall    Cerebral aneurysm    Left leg swelling    Abnormal electromyography    Numbness of hand    Hyperhidrosis    Grieving    Abnormal mammogram    Anxiety     Past Medical History:   Diagnosis Date    Arthritis     Carpal tunnel syndrome     unspecified laterality    Cataract     Cerebral aneurysm     Decreased body height     last assessed 3/17/14    Heart murmur     Hypercholesteremia     Hypertension     Motion sickness     Obesity     Periodic heart flutter     Pneumonia     x1    PONV (postoperative nausea and vomiting)     Rosacea     Vertigo     Wears glasses     Wears partial dentures     upper     Past Surgical History:   Procedure Laterality Date    CARPAL TUNNEL RELEASE Bilateral      SECTION      x1    COLONOSCOPY      IR CEREBRAL ANGIOGRAPHY  10/12/2018    IR IMAGE GUIDED BIOPSY/ASPIRATION LYMPH NODE  2018    [place] LAPAROSCOPY W TOT HYSTERECTUTERUS <=250 GRAM  W TUBE/OVARY N/A 2017    Procedure: HYSTERECTOMY LAPAROSCOPIC TOTAL, BSO, Cystoscopy;  Surgeon: Tiffany Marroquin MD;  [facility];  Service: Gynecology    [place] REVISE MEDIAN N/CARPAL TUNNEL SURG Left 2016    Procedure: RELEASE CARPAL TUNNEL;  Surgeon: Obey Laura MD;  [facility];  Service: Orthopedics     Family History   Problem Relation Age of Onset    Arthritis Mother     Hypertension Mother     Stroke Mother         syndrome    Diabetes Father     Sudden death Father         instantaneous cardiac    Hyperlipidemia Sister     Colon cancer Sister 78    Sudden death Brother         instananeous cardiac    Hodgkin's lymphoma Child 21     Social History     Social History    Marital status: /Civil Union     Spouse name: N/A    Number of children: N/A    Years of education: N/A     Occupational History    Not on file       Social History Main Topics    Smoking status: Never Smoker    Smokeless tobacco: Never Used    Alcohol use No    Drug use: No    Sexual activity: Not on file     Other Topics Concern    Not on file     Social History Narrative    Stress at work           Current Outpatient Prescriptions:     atorvastatin (LIPITOR) 20 mg tablet, Take 1 tablet (20 mg total) by mouth daily at bedtime, Disp: 90 tablet, Rfl: 3    Coenzyme Q10 (COQ-10) 100 MG CAPS, Take by mouth daily, Disp: , Rfl:     losartan (COZAAR) 50 mg tablet, Take 1 tablet (50 mg total) by mouth daily, Disp: 14 tablet, Rfl: 0    Multiple Vitamin (DAILY VALUE MULTIVITAMIN) TABS, Take 1 tablet by mouth daily, Disp: , Rfl:     LORazepam (ATIVAN) 0 5 mg tablet, Take 1 tablet (0 5 mg total) by mouth every 8 (eight) hours as needed for anxiety (Patient not taking: Reported on 1/16/2019 ), Disp: 60 tablet, Rfl: 0    metroNIDAZOLE (METROGEL) 0 75 % gel, , Disp: , Rfl:   Allergies   Allergen Reactions    Penicillins Hives and Rash     Vitals:    01/16/19 1125   BP: 138/90   Pulse: 78   Resp: 16   Temp: 97 8 °F (36 6 °C)       Physical Exam   Skin:   Axillae clinically negative, no palpable adenopathy         Results:  Labs:  CBC, Coags, BMP, Mg, Phos     Imaging    IR image guided biopsy/aspiration lymph node   Status: Final result   PACS Images     Show images for IR image guided biopsy/aspiration lymph node Order Report      Order Details   Order Questions     Question Answer Comment   Exam reason Left sided breast lymphnode biopsy    Note:  Enter reason for exam          Reason For Exam     Left sided breast lymphnode biopsy   Dx: Breast anomaly [Q83 9 (ICD-10-CM)]   Study Result     Examination: Ultrasound-guided biopsy of the left axillary lymph node, not performed     HISTORY: Recent trauma  Underwent mammogram   Shown to have lymph nodes in the axilla  These were confirmed on ultrasound      TECHNIQUE: The patient was brought to Radiology  Informed consent was obtained  Ultrasound was performed of the left axilla  No nodes are identified     FINDINGS: No abnormality  Note target for biopsy     IMPRESSION:  Biopsy deferred                 Workstation performed: [id number]       Cta Head And Neck With And Without Contrast    Result Date: 12/30/2018  Narrative: CTA NECK AND BRAIN WITH AND WITHOUT CONTRAST INDICATION: history of aneurysm  Coming in with headache and dizziness COMPARISON:   10/12/2018, cerebral angiogram   CT angiogram dated 7/5/2018  TECHNIQUE:  Routine CT imaging of the Brain without contrast   Post contrast imaging was performed after administration of iodinated contrast through the neck and brain  Post contrast axial 0 625 mm images timed to opacify the arterial system  3D rendering was performed on an independent workstation  MIP reconstructions performed  Coronal reconstructions were performed of the noncontrast portion of the brain  Radiation dose length product (DLP) for this visit:  1278 74 mGy-cm   This examination, like all CT scans performed in the [facility], was performed utilizing techniques to minimize radiation dose exposure, including the use of iterative reconstruction and automated exposure control     IV Contrast:  85 mL of iohexol (OMNIPAQUE)  IMAGE QUALITY:   Diagnostic FINDINGS: NONCONTRAST BRAIN PARENCHYMA: Decreased attenuation is noted in periventricular and subcortical white matter demonstrating an appearance that is statistically most likely to represent mild microangiopathic change  No CT signs of acute infarction  No intracranial mass, mass effect or midline shift  No acute parenchymal hemorrhage  Noncontrast imaging of the vasculature again demonstrates an aneurysm of the distal left M1 segment, series 2 image 16  VENTRICLES AND EXTRA-AXIAL SPACES:  Normal for the patient's age  VISUALIZED ORBITS AND PARANASAL SINUSES:  Unremarkable  CERVICAL VASCULATURE AORTIC ARCH AND GREAT VESSELS:  Normal aortic arch and great vessel origins  Normal visualized subclavian vessels  RIGHT VERTEBRAL ARTERY CERVICAL SEGMENT:  Normal origin  The vessel is normal in caliber throughout the neck  LEFT VERTEBRAL ARTERY CERVICAL SEGMENT:  Normal origin  The vessel is normal in caliber throughout the neck  RIGHT EXTRACRANIAL CAROTID SEGMENT:  Normal caliber common carotid artery  Normal bifurcation and cervical internal carotid artery  No stenosis or dissection  LEFT EXTRACRANIAL CAROTID SEGMENT:  Normal caliber common carotid artery  Normal bifurcation and cervical internal carotid artery  No stenosis or dissection  NASCET criteria was used to determine the degree of internal carotid artery diameter stenosis  INTRACRANIAL VASCULATURE INTERNAL CAROTID ARTERIES:  Normal enhancement of the intracranial portions of the internal carotid arteries  Normal ophthalmic artery origins  Normal ICA terminus  ANTERIOR CIRCULATION:  Symmetric A1 segments and anterior cerebral arteries with normal enhancement  Normal anterior communicating artery  MIDDLE CEREBRAL ARTERY CIRCULATION:  Normal right M1 segment and middle cerebral artery branches  Left M1 segment demonstrates no stenosis  Once again identified is a lobulated 6 mm M1 bifurcation aneurysm  Stable M2 branches  The 2 largest branches appear to arise directly from the aneurysm   DISTAL VERTEBRAL ARTERIES:  Normal distal vertebral arteries  Posterior inferior cerebellar artery origins are normal  Normal vertebral basilar junction  BASILAR ARTERY:  Basilar artery is normal in caliber  Normal superior cerebellar arteries  POSTERIOR CEREBRAL ARTERIES: There is fetal origin of the right posterior cerebral artery  The left posterior cerebral artery arises from the basilar tip  Both demonstrate no focal stenosis  Hypoplastic right P1 segment  Tiny left posterior communicating artery present  DURAL VENOUS SINUSES:  Normal  NON VASCULAR ANATOMY BONY STRUCTURES:  No acute osseous abnormality  SOFT TISSUES OF THE NECK:  Normal  THORACIC INLET:  Unremarkable  Impression: Stable CT of the brain  Mild chronic microangiopathic change  No subarachnoid hemorrhage  Stable 6 mm left M1 bifurcation aneurysm  Workstation performed: [id number]     Ir Image Guided Biopsy/aspiration Lymph Node    Result Date: 12/21/2018  Narrative: Examination: Ultrasound-guided biopsy of the left axillary lymph node, not performed HISTORY: Recent trauma  Underwent mammogram   Shown to have lymph nodes in the axilla  These were confirmed on ultrasound  TECHNIQUE: The patient was brought to Radiology  Informed consent was obtained  Ultrasound was performed of the left axilla  No nodes are identified FINDINGS: No abnormality  Note target for biopsy     Impression: Biopsy deferred Workstation performed: [id number]     Radiology Results    Result Date: 12/31/2018  Narrative: Ordered by an unspecified provider  I reviewed the above laboratory and imaging data  Discussion/Summary:  49-year-old woman with incidentally found enlarged axillary lymph nodes seen incidentally on mammogram   Large size merited image guided biopsy  At time of planned procedure, biopsy was deferred given that adenopathy was no longer visualized  This is more suggestive of an inflammatory process rather than malignancy    Therefore plan on 3 month follow-up with surveillance ultrasound at that time

## 2019-02-13 ENCOUNTER — TELEPHONE (OUTPATIENT)
Dept: INTERNAL MEDICINE CLINIC | Facility: CLINIC | Age: 69
End: 2019-02-13

## 2019-02-13 NOTE — TELEPHONE ENCOUNTER
I spoke with patient and she would like me to schedule her repeat CT chest, abdomen and pelvis  Script has to get put in

## 2019-02-27 DIAGNOSIS — R93.89 ABNORMAL CT OF THE CHEST: Primary | ICD-10-CM

## 2019-02-28 ENCOUNTER — APPOINTMENT (OUTPATIENT)
Dept: LAB | Facility: HOSPITAL | Age: 69
End: 2019-02-28
Payer: COMMERCIAL

## 2019-02-28 DIAGNOSIS — R93.89 ABNORMAL CT OF THE CHEST: ICD-10-CM

## 2019-02-28 LAB
ANION GAP SERPL CALCULATED.3IONS-SCNC: 10 MMOL/L (ref 4–13)
BUN SERPL-MCNC: 19 MG/DL (ref 5–25)
CALCIUM SERPL-MCNC: 9.3 MG/DL (ref 8.3–10.1)
CHLORIDE SERPL-SCNC: 104 MMOL/L (ref 100–108)
CO2 SERPL-SCNC: 26 MMOL/L (ref 21–32)
CREAT SERPL-MCNC: 0.92 MG/DL (ref 0.6–1.3)
GFR SERPL CREATININE-BSD FRML MDRD: 63 ML/MIN/1.73SQ M
GLUCOSE SERPL-MCNC: 105 MG/DL (ref 65–140)
POTASSIUM SERPL-SCNC: 4.1 MMOL/L (ref 3.5–5.3)
SODIUM SERPL-SCNC: 140 MMOL/L (ref 136–145)

## 2019-02-28 PROCEDURE — 80048 BASIC METABOLIC PNL TOTAL CA: CPT

## 2019-02-28 PROCEDURE — 36415 COLL VENOUS BLD VENIPUNCTURE: CPT

## 2019-03-06 ENCOUNTER — HOSPITAL ENCOUNTER (OUTPATIENT)
Dept: CT IMAGING | Facility: HOSPITAL | Age: 69
Discharge: HOME | End: 2019-03-06
Payer: COMMERCIAL

## 2019-03-06 DIAGNOSIS — R93.89 ABNORMAL CT OF THE CHEST: ICD-10-CM

## 2019-03-06 PROCEDURE — 71250 CT THORAX DX C-: CPT

## 2019-04-03 ENCOUNTER — OFFICE VISIT (OUTPATIENT)
Dept: INTERNAL MEDICINE CLINIC | Facility: CLINIC | Age: 69
End: 2019-04-03
Payer: COMMERCIAL

## 2019-04-03 VITALS
SYSTOLIC BLOOD PRESSURE: 138 MMHG | DIASTOLIC BLOOD PRESSURE: 70 MMHG | HEART RATE: 75 BPM | TEMPERATURE: 98.7 F | WEIGHT: 219 LBS | BODY MASS INDEX: 38.8 KG/M2 | HEIGHT: 63 IN | OXYGEN SATURATION: 98 %

## 2019-04-03 DIAGNOSIS — E11.9 TYPE 2 DIABETES MELLITUS WITHOUT COMPLICATION, WITHOUT LONG-TERM CURRENT USE OF INSULIN (HCC): Primary | ICD-10-CM

## 2019-04-03 DIAGNOSIS — G47.00 INSOMNIA, UNSPECIFIED TYPE: ICD-10-CM

## 2019-04-03 DIAGNOSIS — E55.9 VITAMIN D DEFICIENCY: ICD-10-CM

## 2019-04-03 DIAGNOSIS — I35.0 AORTIC STENOSIS, MILD: ICD-10-CM

## 2019-04-03 DIAGNOSIS — E66.01 SEVERE OBESITY (BMI 35.0-39.9) WITH COMORBIDITY (HCC): ICD-10-CM

## 2019-04-03 DIAGNOSIS — E78.5 DYSLIPIDEMIA: ICD-10-CM

## 2019-04-03 DIAGNOSIS — I10 ACCELERATED ESSENTIAL HYPERTENSION: ICD-10-CM

## 2019-04-03 PROCEDURE — 3008F BODY MASS INDEX DOCD: CPT | Performed by: FAMILY MEDICINE

## 2019-04-03 PROCEDURE — 99214 OFFICE O/P EST MOD 30 MIN: CPT | Performed by: FAMILY MEDICINE

## 2019-04-03 PROCEDURE — 1160F RVW MEDS BY RX/DR IN RCRD: CPT | Performed by: FAMILY MEDICINE

## 2019-04-03 PROCEDURE — 3075F SYST BP GE 130 - 139MM HG: CPT | Performed by: FAMILY MEDICINE

## 2019-04-03 PROCEDURE — 3078F DIAST BP <80 MM HG: CPT | Performed by: FAMILY MEDICINE

## 2019-04-12 LAB
LEFT EYE DIABETIC RETINOPATHY: NORMAL
RIGHT EYE DIABETIC RETINOPATHY: NORMAL

## 2019-04-16 DIAGNOSIS — I10 HYPERTENSION: ICD-10-CM

## 2019-04-16 RX ORDER — LOSARTAN POTASSIUM 50 MG/1
50 TABLET ORAL DAILY
Qty: 90 TABLET | Refills: 3 | Status: SHIPPED | OUTPATIENT
Start: 2019-04-16 | End: 2020-06-17

## 2019-05-06 ENCOUNTER — HOSPITAL ENCOUNTER (OUTPATIENT)
Dept: ULTRASOUND IMAGING | Facility: HOSPITAL | Age: 69
Discharge: HOME | End: 2019-05-06
Attending: SURGERY
Payer: COMMERCIAL

## 2019-05-06 DIAGNOSIS — R92.8 ABNORMAL MAMMOGRAM: ICD-10-CM

## 2019-05-06 PROCEDURE — 76642 ULTRASOUND BREAST LIMITED: CPT

## 2019-05-28 ENCOUNTER — OFFICE VISIT (OUTPATIENT)
Dept: CARDIOLOGY CLINIC | Facility: HOSPITAL | Age: 69
End: 2019-05-28
Payer: COMMERCIAL

## 2019-05-28 VITALS
HEART RATE: 76 BPM | DIASTOLIC BLOOD PRESSURE: 84 MMHG | WEIGHT: 217 LBS | SYSTOLIC BLOOD PRESSURE: 152 MMHG | HEIGHT: 63 IN | OXYGEN SATURATION: 96 % | BODY MASS INDEX: 38.45 KG/M2

## 2019-05-28 DIAGNOSIS — I10 ACCELERATED ESSENTIAL HYPERTENSION: Primary | ICD-10-CM

## 2019-05-28 DIAGNOSIS — E78.5 DYSLIPIDEMIA: ICD-10-CM

## 2019-05-28 DIAGNOSIS — E66.01 CLASS 2 SEVERE OBESITY DUE TO EXCESS CALORIES WITH SERIOUS COMORBIDITY AND BODY MASS INDEX (BMI) OF 36.0 TO 36.9 IN ADULT (HCC): ICD-10-CM

## 2019-05-28 DIAGNOSIS — I35.0 AORTIC STENOSIS, MILD: ICD-10-CM

## 2019-05-28 PROCEDURE — 99214 OFFICE O/P EST MOD 30 MIN: CPT | Performed by: INTERNAL MEDICINE

## 2019-07-11 PROBLEM — M79.89 LEFT LEG SWELLING: Status: RESOLVED | Noted: 2018-07-12 | Resolved: 2019-07-11

## 2019-07-11 PROBLEM — R20.0 NUMBNESS OF HAND: Status: RESOLVED | Noted: 2018-07-17 | Resolved: 2019-07-11

## 2019-07-11 PROBLEM — G47.00 INSOMNIA: Status: RESOLVED | Noted: 2017-03-07 | Resolved: 2019-07-11

## 2019-07-16 ENCOUNTER — APPOINTMENT (OUTPATIENT)
Dept: LAB | Facility: HOSPITAL | Age: 69
End: 2019-07-16
Payer: COMMERCIAL

## 2019-07-16 DIAGNOSIS — R53.83 FATIGUE, UNSPECIFIED TYPE: ICD-10-CM

## 2019-07-16 DIAGNOSIS — E55.9 VITAMIN D DEFICIENCY: ICD-10-CM

## 2019-07-16 DIAGNOSIS — E78.5 DYSLIPIDEMIA: ICD-10-CM

## 2019-07-16 DIAGNOSIS — I10 ACCELERATED ESSENTIAL HYPERTENSION: ICD-10-CM

## 2019-07-16 DIAGNOSIS — E11.9 TYPE 2 DIABETES MELLITUS WITHOUT COMPLICATION, WITHOUT LONG-TERM CURRENT USE OF INSULIN (HCC): ICD-10-CM

## 2019-07-16 DIAGNOSIS — I67.1 CEREBRAL ANEURYSM: ICD-10-CM

## 2019-07-16 LAB
25(OH)D3 SERPL-MCNC: 16.8 NG/ML (ref 30–100)
ALBUMIN SERPL BCP-MCNC: 3.2 G/DL (ref 3.5–5)
ALP SERPL-CCNC: 114 U/L (ref 46–116)
ALT SERPL W P-5'-P-CCNC: 16 U/L (ref 12–78)
ANION GAP SERPL CALCULATED.3IONS-SCNC: 9 MMOL/L (ref 4–13)
AST SERPL W P-5'-P-CCNC: 16 U/L (ref 5–45)
BASOPHILS # BLD AUTO: 0.03 THOUSANDS/ΜL (ref 0–0.1)
BASOPHILS NFR BLD AUTO: 1 % (ref 0–1)
BILIRUB SERPL-MCNC: 0.2 MG/DL (ref 0.2–1)
BUN SERPL-MCNC: 17 MG/DL (ref 5–25)
CALCIUM SERPL-MCNC: 8.8 MG/DL (ref 8.3–10.1)
CHLORIDE SERPL-SCNC: 106 MMOL/L (ref 100–108)
CHOLEST SERPL-MCNC: 157 MG/DL (ref 50–200)
CO2 SERPL-SCNC: 28 MMOL/L (ref 21–32)
CREAT SERPL-MCNC: 0.83 MG/DL (ref 0.6–1.3)
EOSINOPHIL # BLD AUTO: 0.22 THOUSAND/ΜL (ref 0–0.61)
EOSINOPHIL NFR BLD AUTO: 4 % (ref 0–6)
ERYTHROCYTE [DISTWIDTH] IN BLOOD BY AUTOMATED COUNT: 13.2 % (ref 11.6–15.1)
EST. AVERAGE GLUCOSE BLD GHB EST-MCNC: 123 MG/DL
GFR SERPL CREATININE-BSD FRML MDRD: 71 ML/MIN/1.73SQ M
GLUCOSE P FAST SERPL-MCNC: 93 MG/DL (ref 65–99)
HBA1C MFR BLD: 5.9 % (ref 4.2–6.3)
HCT VFR BLD AUTO: 39.6 % (ref 34.8–46.1)
HDLC SERPL-MCNC: 48 MG/DL (ref 40–60)
HGB BLD-MCNC: 12.2 G/DL (ref 11.5–15.4)
IMM GRANULOCYTES # BLD AUTO: 0.01 THOUSAND/UL (ref 0–0.2)
IMM GRANULOCYTES NFR BLD AUTO: 0 % (ref 0–2)
LDLC SERPL CALC-MCNC: 91 MG/DL (ref 0–100)
LYMPHOCYTES # BLD AUTO: 1.84 THOUSANDS/ΜL (ref 0.6–4.47)
LYMPHOCYTES NFR BLD AUTO: 29 % (ref 14–44)
MCH RBC QN AUTO: 29.1 PG (ref 26.8–34.3)
MCHC RBC AUTO-ENTMCNC: 30.8 G/DL (ref 31.4–37.4)
MCV RBC AUTO: 95 FL (ref 82–98)
MONOCYTES # BLD AUTO: 0.42 THOUSAND/ΜL (ref 0.17–1.22)
MONOCYTES NFR BLD AUTO: 7 % (ref 4–12)
NEUTROPHILS # BLD AUTO: 3.81 THOUSANDS/ΜL (ref 1.85–7.62)
NEUTS SEG NFR BLD AUTO: 59 % (ref 43–75)
NONHDLC SERPL-MCNC: 109 MG/DL
NRBC BLD AUTO-RTO: 0 /100 WBCS
PLATELET # BLD AUTO: 246 THOUSANDS/UL (ref 149–390)
PMV BLD AUTO: 9.6 FL (ref 8.9–12.7)
POTASSIUM SERPL-SCNC: 3.6 MMOL/L (ref 3.5–5.3)
PROT SERPL-MCNC: 6.8 G/DL (ref 6.4–8.2)
RBC # BLD AUTO: 4.19 MILLION/UL (ref 3.81–5.12)
SODIUM SERPL-SCNC: 143 MMOL/L (ref 136–145)
TRIGL SERPL-MCNC: 90 MG/DL
TSH SERPL DL<=0.05 MIU/L-ACNC: 4.58 UIU/ML (ref 0.36–3.74)
WBC # BLD AUTO: 6.33 THOUSAND/UL (ref 4.31–10.16)

## 2019-07-16 PROCEDURE — 36415 COLL VENOUS BLD VENIPUNCTURE: CPT

## 2019-07-16 PROCEDURE — 80053 COMPREHEN METABOLIC PANEL: CPT

## 2019-07-16 PROCEDURE — 85025 COMPLETE CBC W/AUTO DIFF WBC: CPT

## 2019-07-16 PROCEDURE — 83036 HEMOGLOBIN GLYCOSYLATED A1C: CPT

## 2019-07-16 PROCEDURE — 84443 ASSAY THYROID STIM HORMONE: CPT

## 2019-07-16 PROCEDURE — 80061 LIPID PANEL: CPT

## 2019-07-16 PROCEDURE — 82306 VITAMIN D 25 HYDROXY: CPT

## 2019-07-17 ENCOUNTER — TELEPHONE (OUTPATIENT)
Dept: SURGICAL ONCOLOGY | Facility: HOSPITAL | Age: 69
End: 2019-07-17

## 2019-07-18 ENCOUNTER — TRANSCRIBE ORDERS (OUTPATIENT)
Dept: RADIOLOGY | Facility: HOSPITAL | Age: 69
End: 2019-07-18

## 2019-07-18 ENCOUNTER — HOSPITAL ENCOUNTER (OUTPATIENT)
Dept: RADIOLOGY | Facility: HOSPITAL | Age: 69
Discharge: HOME | End: 2019-07-18
Attending: NEUROLOGICAL SURGERY
Payer: COMMERCIAL

## 2019-07-18 DIAGNOSIS — I67.1 CEREBRAL ANEURYSM: ICD-10-CM

## 2019-07-18 PROCEDURE — 70496 CT ANGIOGRAPHY HEAD: CPT

## 2019-07-18 RX ADMIN — IOHEXOL 85 ML: 350 INJECTION, SOLUTION INTRAVENOUS at 12:43

## 2019-07-22 ENCOUNTER — TRANSCRIBE ORDERS (OUTPATIENT)
Dept: NEUROSURGERY | Facility: CLINIC | Age: 69
End: 2019-07-22

## 2019-07-22 ENCOUNTER — OFFICE VISIT (OUTPATIENT)
Dept: NEUROSURGERY | Facility: CLINIC | Age: 69
End: 2019-07-22
Payer: COMMERCIAL

## 2019-07-22 VITALS
HEIGHT: 63 IN | DIASTOLIC BLOOD PRESSURE: 76 MMHG | RESPIRATION RATE: 16 BRPM | SYSTOLIC BLOOD PRESSURE: 156 MMHG | BODY MASS INDEX: 38.09 KG/M2 | TEMPERATURE: 98.5 F | HEART RATE: 86 BPM | WEIGHT: 215 LBS

## 2019-07-22 DIAGNOSIS — I67.1 CEREBRAL ANEURYSM: Primary | ICD-10-CM

## 2019-07-22 DIAGNOSIS — Z01.818 PRE-PROCEDURAL EXAMINATION: Primary | ICD-10-CM

## 2019-07-22 PROCEDURE — 99214 OFFICE O/P EST MOD 30 MIN: CPT | Performed by: NEUROLOGICAL SURGERY

## 2019-07-22 RX ORDER — FUROSEMIDE 20 MG/1
TABLET ORAL AS NEEDED
COMMUNITY
Start: 2019-06-30 | End: 2020-07-29 | Stop reason: SDUPTHER

## 2019-07-22 NOTE — PROGRESS NOTES
Patient Id: Orlando Eldridge is a 68 y o  female        Handedness: Right handed      Assessment/Plan:    Diagnoses and all orders for this visit:    Cerebral aneurysm  -     CTA head w wo contrast; Future        Discussion Summary:   1  Unruptured cerebral aneurysm, left MCA bifurcation incorporating both M2 use with irregularity  Returns for 1 year follow-up  CTA stable  We once again discussed the nature of this aneurysm being fusiform  We discussed the natural history and diagnosis of cerebral aneurysms  We discussed the signs and symptoms of aneurysmal rupture  I believe treatment of this aneurysm would be difficult  This is both with an endovascular open vascular fashion  She can have open surgery of vessel reconstruction however proximal portion of the vessels would likely be disease and put her at risk for injury to her distal M2 branches  Similarly , stenting with coiling could partially treat the aneurysm were protected  Either these procedures I believe would have a 3-73% risk complication  Should the aneurysm remained stable I believe that her annual risk is probably an area of 1% per year  This is based on ISIUA and UCAS, which once again are not specific to her disease  UIATS would favor conservative management (8 vs 14)     At this juncture we will proceed with conservative management and repeat CTA 1 year        2  Hypertension  Blood pressure once again elevated  States that is controlled while at home  I have encouraged her to ensure that her blood pressure remains less than 140  She does state that she has many stressors at home  Chief Complaint: Follow-up        HPI:   This is a very pleasant 64-year-old female who presented to [facility] as a trauma after falling from a step ladder and landing on her left side   She suffered multiple rib fractures   Head CT was performed as well as a CTA   This revealed an incidental left MCA aneurysm measuring 5 x 6 mm   For this reason she was referred to Neurosurgery for evaluation      she had a diagnostic cerebral arteriogram proximally 1 years ago  In the intervening years she has had no significant neurologic symptoms with the exception of 1 episode of hypertensive urgency for which she presented to the emergency room  Otherwise no new numbness, tingling, weakness, or stroke-like symptoms      Her past medical history significant for hypertension, hyperlipidemia, and leg swelling   Her past surgical history significant for hysterectomy and bilateral carpal tunnel surgery      She has no family history of subarachnoid hemorrhage or aneurysm   She has no family history of sudden death      She is  for 46 years  Gabrielle Diaz has 5 children ages 37-63  Gabrielle Diaz 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  Gabrielle Diaz denies any history of tobacco or alcohol   She denies any history of drug use      She is allergic to penicillin   She currently takes acetaminophen, atorvastatin, Lasix, Robaxin, valsartan, and hydrochlorothiazide  Review of systems obtained by the MA reviewed and updated below  Review of Systems   Constitutional: Positive for unexpected weight change (weight loss - not trying )  HENT: Negative  Eyes: Negative  Respiratory: Negative  Cardiovascular: Negative  Gastrointestinal: Negative  Endocrine: Negative  Genitourinary: Negative  Musculoskeletal: Negative  Skin: Negative  Allergic/Immunologic: Negative  Neurological: Positive for headaches (Occasional )  Hematological: Negative  Psychiatric/Behavioral: Negative  All other systems reviewed and are negative  Physical Exam  Vitals:    07/22/19 1449   BP: 156/76   Pulse: 86   Resp: 16   Temp: 98 5 °F (36 9 °C)   She is well appearing  Affect is appropriate  Body mass index is 38 09 kg/m²  Barbie Epps She is awake alert and oriented  Hearing and vision are grossly intact     Her pupils are equal round reactive to light  Her extraocular movements are intact  Her face is symmetric  Tongue is midline  Facial sensation is intact and symmetric throughout  Shoulder shrug is 5/5  There is no drift or dysmetria  She has full strength in her bilateral upper and lower extremities  She has normal muscle tone muscle bulk  Her biceps reflexes and patellar reflexes are 2+ and symmetric  Bree sign negative bilaterally  Sensation intact to light touch and pinprick throughout  Her gait is normal      Her heart rate is regular, murmur present  Her breath sounds are clear  2+ radial pulses no carotid bruit          The following portions of the patient's history were reviewed and updated as appropriate: allergies, current medications, past family history, past medical history, past social history, past surgical history and problem list     Active Ambulatory Problems     Diagnosis Date Noted    Carpal tunnel syndrome 04/08/2016    Accelerated essential hypertension 12/06/2013    Dyslipidemia 07/09/2014    Class 2 obesity due to excess calories with body mass index (BMI) of 36 0 to 36 9 in adult 02/05/2013    Aortic stenosis, mild 03/19/2018    Complex endometrial hyperplasia 06/23/2017    Fatigue 12/14/2012    Female pelvic pain 04/03/2017    Type 2 diabetes mellitus without complication, without long-term current use of insulin [facility] ) 01/21/2015    Osteopenia 05/14/2014    Primary osteoarthritis of right knee 05/31/2016    Tendonitis of left hip 06/11/2015    Thickened endometrium 06/01/2017    Asymptomatic varicose veins 06/12/2012    Vitamin D deficiency 06/11/2015    Closed fracture of multiple ribs of left side with routine healing 07/04/2018    Fall 07/04/2018    Cerebral aneurysm 07/04/2018    Abnormal electromyography 07/17/2018    Hyperhidrosis 09/06/2018    Grieving 09/06/2018    Abnormal mammogram 12/07/2018    Anxiety 01/08/2019     Resolved Ambulatory Problems     Diagnosis Date Noted    Insomnia 2017    Snoring 20/10/3374    Systolic ejection murmur     Pneumothorax, left 2018    Left leg swelling 2018    Numbness of hand 2018     Past Medical History:   Diagnosis Date    Arthritis     Cataract     Decreased body height     Heart murmur     Hypercholesteremia     Hypertension     Motion sickness     Obesity     Periodic heart flutter     Pneumonia     PONV (postoperative nausea and vomiting)     Rosacea     Vertigo     Wears glasses     Wears partial dentures        Past Surgical History:   Procedure Laterality Date    CARPAL TUNNEL RELEASE Bilateral      SECTION      x1    COLONOSCOPY      IR CEREBRAL ANGIOGRAPHY  10/12/2018    IR IMAGE GUIDED BIOPSY/ASPIRATION LYMPH NODE  2018    [place] LAPAROSCOPY W TOT HYSTERECTUTERUS <=250 GRAM  W TUBE/OVARY N/A 2017    Procedure: HYSTERECTOMY LAPAROSCOPIC TOTAL, BSO, Cystoscopy;  Surgeon: Katrin Caruso MD;  Location: [facility];  Service: Gynecology    NY REVISE MEDIAN N/CARPAL TUNNEL SURG Left 2016    Procedure: RELEASE CARPAL TUNNEL;  Surgeon: Marisela Young MD;  Location: [facility];  Service: Orthopedics         Current Outpatient Medications:     atorvastatin (LIPITOR) 20 mg tablet, Take 1 tablet (20 mg total) by mouth daily at bedtime, Disp: 90 tablet, Rfl: 3    Coenzyme Q10 (COQ-10) 100 MG CAPS, Take by mouth daily, Disp: , Rfl:     furosemide (LASIX) 20 mg tablet, as needed, Disp: , Rfl:     LORazepam (ATIVAN) 0 5 mg tablet, Take 1 tablet (0 5 mg total) by mouth every 8 (eight) hours as needed for anxiety (Patient taking differently: Take 0 5 mg by mouth as needed for anxiety ), Disp: 60 tablet, Rfl: 0    losartan (COZAAR) 50 mg tablet, Take 1 tablet (50 mg total) by mouth daily, Disp: 90 tablet, Rfl: 3    metroNIDAZOLE (METROGEL) 0 75 % gel, , Disp: , Rfl:     Results/Data: We reviewed the results of her CTA in detail    Stable fusiform and irregular left MCA aneurysm

## 2019-08-12 ENCOUNTER — OFFICE VISIT (OUTPATIENT)
Dept: INTERNAL MEDICINE CLINIC | Facility: CLINIC | Age: 69
End: 2019-08-12
Payer: COMMERCIAL

## 2019-08-12 VITALS
TEMPERATURE: 97.7 F | DIASTOLIC BLOOD PRESSURE: 72 MMHG | BODY MASS INDEX: 37.74 KG/M2 | OXYGEN SATURATION: 97 % | SYSTOLIC BLOOD PRESSURE: 110 MMHG | HEART RATE: 79 BPM | HEIGHT: 63 IN | WEIGHT: 213 LBS

## 2019-08-12 DIAGNOSIS — R22.1 NECK SWELLING: ICD-10-CM

## 2019-08-12 DIAGNOSIS — E11.9 TYPE 2 DIABETES MELLITUS WITHOUT COMPLICATION, WITHOUT LONG-TERM CURRENT USE OF INSULIN (HCC): Primary | ICD-10-CM

## 2019-08-12 DIAGNOSIS — E55.9 VITAMIN D DEFICIENCY: ICD-10-CM

## 2019-08-12 DIAGNOSIS — E78.5 DYSLIPIDEMIA: ICD-10-CM

## 2019-08-12 DIAGNOSIS — I10 ACCELERATED ESSENTIAL HYPERTENSION: ICD-10-CM

## 2019-08-12 DIAGNOSIS — R79.89 ELEVATED TSH: ICD-10-CM

## 2019-08-12 PROCEDURE — 1160F RVW MEDS BY RX/DR IN RCRD: CPT | Performed by: FAMILY MEDICINE

## 2019-08-12 PROCEDURE — 99214 OFFICE O/P EST MOD 30 MIN: CPT | Performed by: FAMILY MEDICINE

## 2019-08-12 PROCEDURE — 3074F SYST BP LT 130 MM HG: CPT | Performed by: FAMILY MEDICINE

## 2019-08-12 PROCEDURE — 3008F BODY MASS INDEX DOCD: CPT | Performed by: FAMILY MEDICINE

## 2019-08-12 PROCEDURE — 1101F PT FALLS ASSESS-DOCD LE1/YR: CPT | Performed by: FAMILY MEDICINE

## 2019-08-12 RX ORDER — ERGOCALCIFEROL 1.25 MG/1
50000 CAPSULE ORAL WEEKLY
Qty: 12 CAPSULE | Refills: 1 | Status: SHIPPED | OUTPATIENT
Start: 2019-08-12 | End: 2019-10-22 | Stop reason: SDUPTHER

## 2019-08-13 NOTE — ASSESSMENT & PLAN NOTE
Lab Results   Component Value Date    HGBA1C 5 9 07/16/2019       No results for input(s): POCGLU in the last 72 hours  Blood Sugar Average: Last 72 hrs:     Hemoglobin A1c stable  Discussed with her that although she is a diabetic by diagnosis with a previous hemoglobin A1c over the past 2 years of 6 7, she is well controlled with diet alone at present  Watch diet  Increase activity level  Follow-up with Ophthalmology regularly  Check feet daily    She declines foot exam

## 2019-08-13 NOTE — PROGRESS NOTES
Assessment/Plan:    Type 2 diabetes mellitus without complication, without long-term current use of insulin (HCC)  Lab Results   Component Value Date    HGBA1C 5 9 07/16/2019       No results for input(s): POCGLU in the last 72 hours  Blood Sugar Average: Last 72 hrs:     Hemoglobin A1c stable  Discussed with her that although she is a diabetic by diagnosis with a previous hemoglobin A1c over the past 2 years of 6 7, she is well controlled with diet alone at present  Watch diet  Increase activity level  Follow-up with Ophthalmology regularly  Check feet daily  She declines foot exam     Accelerated essential hypertension  Blood pressure currently well controlled but has been variable in the past   Continue with the losartan  Discussed with her potentially increasing this in the future for blood pressure remains variable especially with her history of the aneurysm which is followed by Neurosurgery  Watch salt intake  Dyslipidemia  Continue with the atorvastatin  Watch diet  Increase fiber in diet  Increase activity level  Elevated TSH  TSH has been slowly increasing  Will recheck a TSH in about 6-8 weeks  If it increases or if she continues with symptoms of hypothyroidism, would consider starting levothyroxine at that point  Signs and symptoms of hypothyroidism reviewed  Vitamin D deficiency  Vitamin-D level significantly low and has been persistently so  Will have her start on the high-dose vitamin-D once weekly  Will have her do this for at least the next 4 months and then recheck laboratory testing at that point  Diagnoses and all orders for this visit:    Type 2 diabetes mellitus without complication, without long-term current use of insulin (HCC)  -     Microalbumin / creatinine urine ratio; Future  -     CBC and differential; Future  -     HEMOGLOBIN A1C W/ EAG ESTIMATION;  Future    Accelerated essential hypertension  -     CBC and differential; Future    Dyslipidemia  - CBC and differential; Future  -     Comprehensive metabolic panel; Future  -     Lipid panel; Future    Vitamin D deficiency  -     ergocalciferol (VITAMIN D2) 50,000 units; Take 1 capsule (50,000 Units total) by mouth once a week  -     CBC and differential; Future  -     Vitamin D 25 hydroxy; Future    Elevated TSH  -     TSH, 3rd generation; Future  -     CBC and differential; Future  -     TSH, 3rd generation; Future    Neck swelling  -     CBC and differential; Future  -     XR clavicle right; Future    Other orders  -     Cancel: Ambulatory referral to Gastroenterology; Future        Orders recommendations as noted above  Recommend flu shot yearly  She is up-to-date on mammogram and will be due again in the fall  She is up-to-date on bone density  She will be due for colonoscopy later this year  She wishes to hold off on the bone density at present especially with her  being recently hospitalized  She will consider this along with her mammogram in the fall  Will have her follow up in about 3-5 months or sooner if needed  Subjective:      Patient ID: iJmmy Woods is a 68 y o  female  She presents for routine follow-up  Has been under a lot of stress with her 's worsening health  He had been recently hospitalized  Recently returned home  Many stressors with her regarding his health as well as financial concerns  Has some stressors with her children as well  The appetite has been stable  Denies any nausea, vomiting, or diarrhea  Tolerating her atorvastatin without difficulty  Denies any significant muscle aches or weakness with this  Denies any chest pain or palpitations  Continues to follow-up with Cardiology regularly  Continues with the losartan on a regular basis  Blood pressures been variable at specialist appointments  Blood pressure, however, has been good at home  Tries to watch her salt intake    She feels that some of her blood pressure elevation may be related to her stressors  Still has difficulty sleeping  Takes an occasional Ativan but this only gives her about 2-3 hours of uninterrupted sleep chronically feels tired  She is concerned about an area that seems swollen or enlarged over the right sternoclavicular area  She noticed that this side seems to be larger than the other  Denies any pain over the area  Denies any significant shortness of breath  The following portions of the patient's history were reviewed and updated as appropriate:   She  has a past medical history of Arthritis, Carpal tunnel syndrome, Cataract, Cerebral aneurysm, Decreased body height, Heart murmur, Hypercholesteremia, Hypertension, Motion sickness, Obesity, Periodic heart flutter, Pneumonia, PONV (postoperative nausea and vomiting), Rosacea, Vertigo, Wears glasses, and Wears partial dentures    She   Patient Active Problem List    Diagnosis Date Noted    Elevated TSH 08/12/2019    Anxiety 01/08/2019    Abnormal mammogram 12/07/2018    Hyperhidrosis 09/06/2018    Grieving 09/06/2018    Abnormal electromyography 07/17/2018    Closed fracture of multiple ribs of left side with routine healing 07/04/2018    Fall 07/04/2018    Cerebral aneurysm 07/04/2018    Aortic stenosis, mild 03/19/2018    Complex endometrial hyperplasia 06/23/2017    Thickened endometrium 06/01/2017    Female pelvic pain 04/03/2017    Primary osteoarthritis of right knee 05/31/2016    Carpal tunnel syndrome 04/08/2016    Tendonitis of left hip 06/11/2015    Vitamin D deficiency 06/11/2015    Type 2 diabetes mellitus without complication, without long-term current use of insulin [facility] ) 01/21/2015    Dyslipidemia 07/09/2014    Osteopenia 05/14/2014    Accelerated essential hypertension 12/06/2013    Class 2 obesity due to excess calories with body mass index (BMI) of 36 0 to 36 9 in adult 02/05/2013    Fatigue 12/14/2012    Asymptomatic varicose veins 06/12/2012     She  has a past surgical history that includes Colonoscopy;  section; pr revise median n/carpal tunnel surg (Left, 2016); Carpal tunnel release (Bilateral); pr laparoscopy w tot hysterectuterus <=250 gram  w tube/ovary (N/A, 2017); IR cerebral angiography (10/12/2018); and IR image guided biopsy/aspiration lymph node (2018)  Her family history includes Arthritis in her mother; Colon cancer (age of onset: 78) in her sister; Diabetes in her father; Hodgkin's lymphoma (age of onset: 24) in her child; Hyperlipidemia in her sister; Hypertension in her mother; Stroke in her mother; Sudden death in her brother and father  She  reports that she has never smoked  She has never used smokeless tobacco  She reports that she does not drink alcohol or use drugs  Current Outpatient Medications   Medication Sig Dispense Refill    atorvastatin (LIPITOR) 20 mg tablet Take 1 tablet (20 mg total) by mouth daily at bedtime 90 tablet 3    Coenzyme Q10 (COQ-10) 100 MG CAPS Take by mouth daily      furosemide (LASIX) 20 mg tablet as needed      LORazepam (ATIVAN) 0 5 mg tablet Take 1 tablet (0 5 mg total) by mouth every 8 (eight) hours as needed for anxiety (Patient taking differently: Take 0 5 mg by mouth as needed for anxiety ) 60 tablet 0    losartan (COZAAR) 50 mg tablet Take 1 tablet (50 mg total) by mouth daily 90 tablet 3    metroNIDAZOLE (METROGEL) 0 75 % gel       ergocalciferol (VITAMIN D2) 50,000 units Take 1 capsule (50,000 Units total) by mouth once a week 12 capsule 1     No current facility-administered medications for this visit        Current Outpatient Medications on File Prior to Visit   Medication Sig    atorvastatin (LIPITOR) 20 mg tablet Take 1 tablet (20 mg total) by mouth daily at bedtime    Coenzyme Q10 (COQ-10) 100 MG CAPS Take by mouth daily    furosemide (LASIX) 20 mg tablet as needed    LORazepam (ATIVAN) 0 5 mg tablet Take 1 tablet (0 5 mg total) by mouth every 8 (eight) hours as needed for anxiety (Patient taking differently: Take 0 5 mg by mouth as needed for anxiety )    losartan (COZAAR) 50 mg tablet Take 1 tablet (50 mg total) by mouth daily    metroNIDAZOLE (METROGEL) 0 75 % gel      No current facility-administered medications on file prior to visit  She is allergic to penicillins       Review of Systems   Constitutional: Positive for fatigue  Negative for activity change, appetite change, chills and fever  HENT: Negative for congestion and rhinorrhea  Eyes: Negative for visual disturbance  Respiratory: Negative for chest tightness and shortness of breath  Cardiovascular: Negative for chest pain and palpitations  Gastrointestinal: Negative for abdominal pain, blood in stool, diarrhea, nausea and vomiting  Endocrine: Negative for polydipsia, polyphagia and polyuria  Genitourinary: Negative for dysuria, frequency and urgency  Musculoskeletal: Negative for gait problem  Skin: Negative for color change  Neurological: Negative for dizziness and headaches  Hematological: Does not bruise/bleed easily  Psychiatric/Behavioral: Positive for decreased concentration and sleep disturbance  Negative for confusion  The patient is not nervous/anxious  Objective:      /72 (BP Location: Left arm, Patient Position: Sitting, Cuff Size: Large)   Pulse 79   Temp 97 7 °F (36 5 °C) (Temporal)   Ht 5' 3" (1 6 m)   Wt 96 6 kg (213 lb)   LMP  (LMP Unknown)   SpO2 97%   BMI 37 73 kg/m²          Physical Exam   Constitutional: She is oriented to person, place, and time  She is cooperative  No distress  HENT:   Head: Normocephalic and atraumatic  Mouth/Throat: Oropharynx is clear and moist    Moist mucous membranes; slight cerumen   Eyes: Pupils are equal, round, and reactive to light  Conjunctivae are normal  Right eye exhibits no discharge  Left eye exhibits no discharge  Neck: Carotid bruit is not present  No thyromegaly present     Slight fullness over the right sternoclavicular joint with some slight asymmetry   Cardiovascular: Normal rate, regular rhythm and normal heart sounds  Exam reveals no gallop and no friction rub  No murmur heard  Pulmonary/Chest: No respiratory distress  She has no wheezes  She has no rales  Abdominal: Bowel sounds are normal  She exhibits no distension  There is no tenderness  Musculoskeletal: She exhibits no edema  Lymphadenopathy:     She has no cervical adenopathy  Neurological: She is alert and oriented to person, place, and time  Skin: Skin is warm and dry  Psychiatric: She has a normal mood and affect  Her behavior is normal    Nursing note and vitals reviewed

## 2019-08-13 NOTE — ASSESSMENT & PLAN NOTE
Blood pressure currently well controlled but has been variable in the past   Continue with the losartan  Discussed with her potentially increasing this in the future for blood pressure remains variable especially with her history of the aneurysm which is followed by Neurosurgery  Watch salt intake

## 2019-08-13 NOTE — ASSESSMENT & PLAN NOTE
TSH has been slowly increasing  Will recheck a TSH in about 6-8 weeks  If it increases or if she continues with symptoms of hypothyroidism, would consider starting levothyroxine at that point  Signs and symptoms of hypothyroidism reviewed

## 2019-08-13 NOTE — ASSESSMENT & PLAN NOTE
Vitamin-D level significantly low and has been persistently so  Will have her start on the high-dose vitamin-D once weekly  Will have her do this for at least the next 4 months and then recheck laboratory testing at that point

## 2019-08-14 ENCOUNTER — CLINICAL SUPPORT (OUTPATIENT)
Dept: CARDIOLOGY CLINIC | Facility: HOSPITAL | Age: 69
End: 2019-08-14
Payer: COMMERCIAL

## 2019-08-14 ENCOUNTER — HOSPITAL ENCOUNTER (OUTPATIENT)
Dept: RADIOLOGY | Facility: HOSPITAL | Age: 69
Discharge: HOME | End: 2019-08-14
Payer: COMMERCIAL

## 2019-08-14 VITALS — OXYGEN SATURATION: 97 % | DIASTOLIC BLOOD PRESSURE: 86 MMHG | HEART RATE: 80 BPM | SYSTOLIC BLOOD PRESSURE: 142 MMHG

## 2019-08-14 DIAGNOSIS — I10 ACCELERATED ESSENTIAL HYPERTENSION: Primary | ICD-10-CM

## 2019-08-14 DIAGNOSIS — R22.1 NECK SWELLING: ICD-10-CM

## 2019-08-14 PROCEDURE — 73000 X-RAY EXAM OF COLLAR BONE: CPT

## 2019-08-14 PROCEDURE — 99211 OFF/OP EST MAY X REQ PHY/QHP: CPT

## 2019-08-14 NOTE — PROGRESS NOTES
Patient came to the office this morning for a bp check under the direction of dr Gali Martell  BP was 142/86 p- 80  She had no complaints she is feeling well

## 2019-10-22 DIAGNOSIS — E55.9 VITAMIN D DEFICIENCY: ICD-10-CM

## 2019-10-22 RX ORDER — ERGOCALCIFEROL 1.25 MG/1
50000 CAPSULE ORAL WEEKLY
Qty: 12 CAPSULE | Refills: 0 | Status: SHIPPED | OUTPATIENT
Start: 2019-10-22 | End: 2020-08-24 | Stop reason: ALTCHOICE

## 2019-12-11 ENCOUNTER — OFFICE VISIT (OUTPATIENT)
Dept: INTERNAL MEDICINE CLINIC | Facility: CLINIC | Age: 69
End: 2019-12-11
Payer: COMMERCIAL

## 2019-12-11 VITALS
DIASTOLIC BLOOD PRESSURE: 90 MMHG | OXYGEN SATURATION: 98 % | BODY MASS INDEX: 37.74 KG/M2 | HEIGHT: 63 IN | SYSTOLIC BLOOD PRESSURE: 136 MMHG | WEIGHT: 213 LBS | HEART RATE: 78 BPM | TEMPERATURE: 97.7 F

## 2019-12-11 DIAGNOSIS — M54.31 SCIATICA OF RIGHT SIDE: Primary | ICD-10-CM

## 2019-12-11 DIAGNOSIS — Z12.31 VISIT FOR SCREENING MAMMOGRAM: ICD-10-CM

## 2019-12-11 DIAGNOSIS — Z78.0 POSTMENOPAUSAL: ICD-10-CM

## 2019-12-11 PROCEDURE — 1036F TOBACCO NON-USER: CPT | Performed by: FAMILY MEDICINE

## 2019-12-11 PROCEDURE — 3008F BODY MASS INDEX DOCD: CPT | Performed by: FAMILY MEDICINE

## 2019-12-11 PROCEDURE — 1160F RVW MEDS BY RX/DR IN RCRD: CPT | Performed by: FAMILY MEDICINE

## 2019-12-11 PROCEDURE — 99213 OFFICE O/P EST LOW 20 MIN: CPT | Performed by: FAMILY MEDICINE

## 2019-12-11 RX ORDER — NAPROXEN 500 MG/1
500 TABLET ORAL 2 TIMES DAILY WITH MEALS
Qty: 60 TABLET | Refills: 5 | Status: SHIPPED | OUTPATIENT
Start: 2019-12-11 | End: 2020-09-23 | Stop reason: ALTCHOICE

## 2019-12-11 RX ORDER — METHOCARBAMOL 750 MG/1
750 TABLET, FILM COATED ORAL EVERY 6 HOURS PRN
Qty: 100 TABLET | Refills: 1 | Status: SHIPPED | OUTPATIENT
Start: 2019-12-11 | End: 2020-09-23 | Stop reason: ALTCHOICE

## 2019-12-11 NOTE — PATIENT INSTRUCTIONS
Sciatica   WHAT YOU NEED TO KNOW:   What is sciatica? Sciatica is a condition that causes pain along your sciatic nerve  The sciatic nerve runs from your spine through both sides of your buttocks  It then runs down the back of your thigh, into your lower leg and foot  Any place along your sciatic nerve may be compressed, inflamed, irritated, or stretched and cause symptoms  What causes sciatica? Sciatica may be related to certain activities, poor posture, and physical or psychological stress  Any of the following may cause or increase your risk of sciatica:  · Disc problems:  A slipped disc (soft cushion in between the bones of the spine) is the most common cause of sciatica  The disc may press on the sciatic nerve  One bone in your spine may slip over another, or you may have narrowing of the spinal column  · Muscle injury: This may happen after you twist or lift a heavy object  Swelling from sprained or irritated muscles in the buttocks, thighs, or legs press on the sciatic nerve  · Obesity or pregnancy:  Extra weight increases pressure on your back and legs  · Trauma:  Direct blows on the buttocks, thighs, or legs, car accidents, or falls may injure the sciatic nerve  · Diseases of the spine:  Arthritis, osteoporosis, cancer, or infection of the spine may also affect the sciatic nerve  What are the signs and symptoms of sciatica? The symptoms of sciatic may be short-term or long-term:  · Pain that goes from the lower back into your buttocks and down the back of your thigh    · Numbness or tingling in your buttocks and legs    · Muscle weakness, difficulty moving or controlling your leg or foot    · Leg pain that increases with standing, sitting, or squatting  How is sciatica diagnosed? Your healthcare provider will ask about other health conditions you may have  He may ask you about your job, history of back pain, diseases, or surgeries you have had   He will examine you and move your legs to see what increases pain  You may also need any of the following:  · X-rays: This is a picture of the bones and tissues in your back, hip, thigh, or leg  This test may show other problems, such as fractures (broken bones)  · CT scan: This test is also called a CAT scan  An x-ray machine uses a computer to take pictures of your hips, thighs, and legs  The pictures may show your sciatic nerve, muscles, and blood vessels  You may be given a dye before the pictures are taken to help healthcare providers see the pictures better  Tell the healthcare provider if you have ever had an allergic reaction to contrast dye  · MRI:  This scan uses powerful magnets and a computer to take pictures of your hips, thighs, and legs  An MRI may show damaged nerves, muscles, bones, and blood vessels  You may be given dye to help the pictures show up better  Tell the healthcare provider if you have ever had an allergic reaction to contrast dye  Do not enter the MRI room with anything metal  Metal can cause serious injury  Tell the healthcare provider if you have any metal in or on your body  · An electromyography (EMG)  test measures the electrical activity of your muscles at rest and with movement  · Nerve conduction tests: These tests check how surface nerves and related muscles respond to stimulation  Electrodes with wires or tiny needles are placed on certain areas, such as the buttocks and legs  How is sciatica treated? · NSAIDs:  These medicines decrease swelling and pain  NSAIDs are available without a doctor's order  Ask your healthcare provider which medicine is right for you  Ask how much to take and when to take it  Take as directed  NSAIDs can cause stomach bleeding or kidney problems if not taken correctly  · Acetaminophen: This medicine decreases pain  Acetaminophen is available without a doctor's order  Ask how much to take and how often to take it  Follow directions   Acetaminophen can cause liver damage if not taken correctly  · Muscle relaxers  help decrease pain and muscle spasms  · Epidural steroid medicine: This may include both an anesthetic (numbing medicine) and a steroid, which may decrease swelling and relieve pain  It is given as a shot close to the spine in the area where you have pain  · Chemonucleolysis: This is an injection given into the damaged disc to soften or shrink the disc  · Surgery: This may be done to correct problems such as a damaged disc, or a tumor in your spine  It may be done to decrease the pressure on the sciatic nerve  Healthcare providers may also release the muscle that may be pressing into your sciatic nerve  How can I help manage sciatica? · Ultrasound therapy: This is a machine that uses sound waves to decrease pain  Topical medicines may be added to help decrease pain and inflammation  · Physical therapy:  A physical therapist teaches you exercises to help improve movement and strength, and to decrease pain  An occupational therapist teaches you skills to help with your daily activities  · Assistive devices: You may need to wear back support, such as a back brace  You may need crutches, a cane, or a walker to decrease stress on your lower back and leg muscles  Ask your healthcare provider for more information about assistive devices and how to use them correctly  How can sciatica be prevented? · Avoid pressure on your back and legs:  Do not  lift heavy objects, or stand or sit for long periods of time  · Lift objects safely:  Keep your back straight and bend your knees when you  an object  Do not bend or twist your back when you lift  · Maintain a healthy weight:  Ask your healthcare provider how much you should weigh  Ask him to help you create a weight loss plan if you are overweight  · Exercise:  Ask your healthcare provider about the best stretching, warmup, and exercise plan for you    What are the risks of sciatica? An epidural steroid injection can lead to pain disorders or paralysis if it is placed incorrectly  It may also cause headaches, leg pain, and blockage of blood flow to the spinal cord  Surgery may cause you to bleed or get an infection  If not treated, your muscles and nerves may become damaged permanently  You may have decreased strength  You may not be able to move your leg or control when you urinate or have bowel movements  When should I contact my healthcare provider? · You have pain in your lower back at night or when resting  · You have pain in your lower back with numbness below the knee  · You have weakness in one leg only  · You have questions or concerns about your condition or care  When should I seek immediate care or call 911? · You have trouble holding back your urine or bowel movements  · You have weakness in both legs  · You have numbness in your groin or buttocks  CARE AGREEMENT:   You have the right to help plan your care  Learn about your health condition and how it may be treated  Discuss treatment options with your caregivers to decide what care you want to receive  You always have the right to refuse treatment  The above information is an  only  It is not intended as medical advice for individual conditions or treatments  Talk to your doctor, nurse or pharmacist before following any medical regimen to see if it is safe and effective for you  © 2017 [employer] Information is for End User's use only and may not be sold, redistributed or otherwise used for commercial purposes  All illustrations and images included in CareNotes® are the copyrighted property of [employer]  or Trevor Valverde

## 2019-12-11 NOTE — PROGRESS NOTES
Assessment/Plan:    No problem-specific Assessment & Plan notes found for this encounter  Diagnoses and all orders for this visit:    Sciatica of right side  -     naproxen (NAPROSYN) 500 mg tablet; Take 1 tablet (500 mg total) by mouth 2 (two) times a day with meals  -     methocarbamol (ROBAXIN) 750 mg tablet; Take 1 tablet (750 mg total) by mouth every 6 (six) hours as needed for muscle spasms  -     Ambulatory referral to Physical Therapy; Future    Visit for screening mammogram  -     Mammo screening bilateral w 3d & cad; Future    Postmenopausal  -     DXA bone density spine hip and pelvis; Future        Orders recommendations as noted above  Discussed with her steroids which may help her symptoms resolved more quickly but she declines at present  Watch for any worsening  Hold the atorvastatin for now to see if that helps with her symptoms at all but doubt that this is affecting her current symptoms  Discussed with her recent stress levels  She has many stressors with her work, her elderly , and her son having glioblastoma  Will have her follow up for routine visit in the near future  Subjective:      Patient ID: Henrik Hong is a 68 y o  female  She presents for acute visit  She started about a month ago with right-sided back pain  This settled into the right low back and into the buttocks  Radiates down into the buttock and thigh area  He denies any numbness or tingling  Denies any bowel or bladder issues  Worse after working  Has pain increased with activity  Has pain at sleep  Tends to sleep very poorly overall  Has had a lot of stressors  Continues to have stress with her  who is elderly  Continues to have some stress at work  Also her son was recently diagnosed with a glioblastoma and is undergoing treatment        The following portions of the patient's history were reviewed and updated as appropriate:   She  has a past medical history of Arthritis, Carpal tunnel syndrome, Cataract, Cerebral aneurysm, Decreased body height, Heart murmur, Hypercholesteremia, Hypertension, Motion sickness, Obesity, Periodic heart flutter, Pneumonia, PONV (postoperative nausea and vomiting), Rosacea, Vertigo, Wears glasses, and Wears partial dentures  She   Patient Active Problem List    Diagnosis Date Noted    Sciatica of right side 2019    Elevated TSH 2019    Anxiety 2019    Abnormal mammogram 2018    Hyperhidrosis 2018    Grieving 2018    Abnormal electromyography 2018    Closed fracture of multiple ribs of left side with routine healing 2018    Fall 2018    Cerebral aneurysm 2018    Aortic stenosis, mild 2018    Complex endometrial hyperplasia 2017    Thickened endometrium 2017    Female pelvic pain 2017    Primary osteoarthritis of right knee 2016    Carpal tunnel syndrome 2016    Tendonitis of left hip 2015    Vitamin D deficiency 2015    Type 2 diabetes mellitus without complication, without long-term current use of insulin [facility] 2015    Dyslipidemia 2014    Osteopenia 2014    Accelerated essential hypertension 2013    Class 2 obesity due to excess calories with body mass index (BMI) of 36 0 to 36 9 in adult 2013    Fatigue 2012    Asymptomatic varicose veins 2012     She  has a past surgical history that includes Colonoscopy;  section; pr revise median n/carpal tunnel surg (Left, 2016); Carpal tunnel release (Bilateral); pr laparoscopy w tot hysterectuterus <=250 gram  w tube/ovary (N/A, 2017); IR cerebral angiography (10/12/2018); and IR image guided biopsy/aspiration lymph node (2018)  Her family history includes Arthritis in her mother; Colon cancer (age of onset: 78) in her sister; Diabetes in her father; Hodgkin's lymphoma (age of onset: 24) in her child;  Hyperlipidemia in her sister; Hypertension in her mother; Stroke in her mother; Sudden death in her brother and father  She  reports that she has never smoked  She has never used smokeless tobacco  She reports that she does not drink alcohol or use drugs  Current Outpatient Medications   Medication Sig Dispense Refill    atorvastatin (LIPITOR) 20 mg tablet Take 1 tablet (20 mg total) by mouth daily at bedtime 90 tablet 3    Coenzyme Q10 (COQ-10) 100 MG CAPS Take by mouth daily      ergocalciferol (VITAMIN D2) 50,000 units Take 1 capsule (50,000 Units total) by mouth once a week 12 capsule 0    furosemide (LASIX) 20 mg tablet as needed      LORazepam (ATIVAN) 0 5 mg tablet Take 1 tablet (0 5 mg total) by mouth every 8 (eight) hours as needed for anxiety (Patient taking differently: Take 0 5 mg by mouth as needed for anxiety ) 60 tablet 0    losartan (COZAAR) 50 mg tablet Take 1 tablet (50 mg total) by mouth daily 90 tablet 3    metroNIDAZOLE (METROGEL) 0 75 % gel       methocarbamol (ROBAXIN) 750 mg tablet Take 1 tablet (750 mg total) by mouth every 6 (six) hours as needed for muscle spasms 100 tablet 1    naproxen (NAPROSYN) 500 mg tablet Take 1 tablet (500 mg total) by mouth 2 (two) times a day with meals 60 tablet 5     No current facility-administered medications for this visit        Current Outpatient Medications on File Prior to Visit   Medication Sig    atorvastatin (LIPITOR) 20 mg tablet Take 1 tablet (20 mg total) by mouth daily at bedtime    Coenzyme Q10 (COQ-10) 100 MG CAPS Take by mouth daily    ergocalciferol (VITAMIN D2) 50,000 units Take 1 capsule (50,000 Units total) by mouth once a week    furosemide (LASIX) 20 mg tablet as needed    LORazepam (ATIVAN) 0 5 mg tablet Take 1 tablet (0 5 mg total) by mouth every 8 (eight) hours as needed for anxiety (Patient taking differently: Take 0 5 mg by mouth as needed for anxiety )    losartan (COZAAR) 50 mg tablet Take 1 tablet (50 mg total) by mouth daily    metroNIDAZOLE (METROGEL) 0 75 % gel      No current facility-administered medications on file prior to visit  She is allergic to penicillins       Review of Systems   Constitutional: Positive for activity change and fatigue  Musculoskeletal: Positive for back pain  Neurological: Negative for numbness  Psychiatric/Behavioral: Positive for decreased concentration, dysphoric mood and sleep disturbance  Objective:      /90 (BP Location: Left arm, Patient Position: Sitting, Cuff Size: Large)   Pulse 78   Temp 97 7 °F (36 5 °C) (Temporal)   Ht 5' 3" (1 6 m)   Wt 96 6 kg (213 lb)   LMP  (LMP Unknown)   SpO2 98%   BMI 37 73 kg/m²          Physical Exam   Constitutional: She is cooperative  Musculoskeletal:   Tenderness over the low back area right greater than left; positive straight leg raise on the right at about 40°   Neurological: She is alert  No sensory deficit  Nursing note and vitals reviewed

## 2020-02-24 ENCOUNTER — APPOINTMENT (OUTPATIENT)
Dept: LAB | Facility: HOSPITAL | Age: 70
End: 2020-02-24
Payer: COMMERCIAL

## 2020-02-24 DIAGNOSIS — E55.9 VITAMIN D DEFICIENCY: ICD-10-CM

## 2020-02-24 DIAGNOSIS — I10 ACCELERATED ESSENTIAL HYPERTENSION: ICD-10-CM

## 2020-02-24 DIAGNOSIS — R22.1 NECK SWELLING: ICD-10-CM

## 2020-02-24 DIAGNOSIS — R79.89 ELEVATED TSH: ICD-10-CM

## 2020-02-24 DIAGNOSIS — E78.5 DYSLIPIDEMIA: ICD-10-CM

## 2020-02-24 DIAGNOSIS — E11.9 TYPE 2 DIABETES MELLITUS WITHOUT COMPLICATION, WITHOUT LONG-TERM CURRENT USE OF INSULIN (HCC): ICD-10-CM

## 2020-02-24 LAB
25(OH)D3 SERPL-MCNC: 33.5 NG/ML (ref 30–100)
ALBUMIN SERPL BCP-MCNC: 3.3 G/DL (ref 3.5–5)
ALP SERPL-CCNC: 117 U/L (ref 46–116)
ALT SERPL W P-5'-P-CCNC: 19 U/L (ref 12–78)
ANION GAP SERPL CALCULATED.3IONS-SCNC: 6 MMOL/L (ref 4–13)
AST SERPL W P-5'-P-CCNC: 21 U/L (ref 5–45)
BASOPHILS # BLD AUTO: 0.03 THOUSANDS/ΜL (ref 0–0.1)
BASOPHILS NFR BLD AUTO: 1 % (ref 0–1)
BILIRUB SERPL-MCNC: 0.3 MG/DL (ref 0.2–1)
BUN SERPL-MCNC: 15 MG/DL (ref 5–25)
CALCIUM SERPL-MCNC: 9.1 MG/DL (ref 8.3–10.1)
CHLORIDE SERPL-SCNC: 105 MMOL/L (ref 100–108)
CHOLEST SERPL-MCNC: 254 MG/DL (ref 50–200)
CO2 SERPL-SCNC: 30 MMOL/L (ref 21–32)
CREAT SERPL-MCNC: 0.86 MG/DL (ref 0.6–1.3)
EOSINOPHIL # BLD AUTO: 0.25 THOUSAND/ΜL (ref 0–0.61)
EOSINOPHIL NFR BLD AUTO: 4 % (ref 0–6)
ERYTHROCYTE [DISTWIDTH] IN BLOOD BY AUTOMATED COUNT: 13.2 % (ref 11.6–15.1)
EST. AVERAGE GLUCOSE BLD GHB EST-MCNC: 126 MG/DL
GFR SERPL CREATININE-BSD FRML MDRD: 68 ML/MIN/1.73SQ M
GLUCOSE P FAST SERPL-MCNC: 102 MG/DL (ref 65–99)
HBA1C MFR BLD: 6 %
HCT VFR BLD AUTO: 40.5 % (ref 34.8–46.1)
HDLC SERPL-MCNC: 55 MG/DL
HGB BLD-MCNC: 12.8 G/DL (ref 11.5–15.4)
IMM GRANULOCYTES # BLD AUTO: 0.01 THOUSAND/UL (ref 0–0.2)
IMM GRANULOCYTES NFR BLD AUTO: 0 % (ref 0–2)
LDLC SERPL CALC-MCNC: 175 MG/DL (ref 0–100)
LYMPHOCYTES # BLD AUTO: 1.33 THOUSANDS/ΜL (ref 0.6–4.47)
LYMPHOCYTES NFR BLD AUTO: 23 % (ref 14–44)
MCH RBC QN AUTO: 29.8 PG (ref 26.8–34.3)
MCHC RBC AUTO-ENTMCNC: 31.6 G/DL (ref 31.4–37.4)
MCV RBC AUTO: 94 FL (ref 82–98)
MONOCYTES # BLD AUTO: 0.41 THOUSAND/ΜL (ref 0.17–1.22)
MONOCYTES NFR BLD AUTO: 7 % (ref 4–12)
NEUTROPHILS # BLD AUTO: 3.83 THOUSANDS/ΜL (ref 1.85–7.62)
NEUTS SEG NFR BLD AUTO: 65 % (ref 43–75)
NONHDLC SERPL-MCNC: 199 MG/DL
NRBC BLD AUTO-RTO: 0 /100 WBCS
PLATELET # BLD AUTO: 273 THOUSANDS/UL (ref 149–390)
PMV BLD AUTO: 9.1 FL (ref 8.9–12.7)
POTASSIUM SERPL-SCNC: 4.2 MMOL/L (ref 3.5–5.3)
PROT SERPL-MCNC: 7.3 G/DL (ref 6.4–8.2)
RBC # BLD AUTO: 4.3 MILLION/UL (ref 3.81–5.12)
SODIUM SERPL-SCNC: 141 MMOL/L (ref 136–145)
TRIGL SERPL-MCNC: 120 MG/DL
TSH SERPL DL<=0.05 MIU/L-ACNC: 3.43 UIU/ML (ref 0.36–3.74)
WBC # BLD AUTO: 5.86 THOUSAND/UL (ref 4.31–10.16)

## 2020-02-24 PROCEDURE — 80061 LIPID PANEL: CPT

## 2020-02-24 PROCEDURE — 83036 HEMOGLOBIN GLYCOSYLATED A1C: CPT

## 2020-02-24 PROCEDURE — 84443 ASSAY THYROID STIM HORMONE: CPT

## 2020-02-24 PROCEDURE — 36415 COLL VENOUS BLD VENIPUNCTURE: CPT

## 2020-02-24 PROCEDURE — 85025 COMPLETE CBC W/AUTO DIFF WBC: CPT

## 2020-02-24 PROCEDURE — 82306 VITAMIN D 25 HYDROXY: CPT

## 2020-02-24 PROCEDURE — 80053 COMPREHEN METABOLIC PANEL: CPT

## 2020-02-27 ENCOUNTER — OFFICE VISIT (OUTPATIENT)
Dept: INTERNAL MEDICINE CLINIC | Facility: CLINIC | Age: 70
End: 2020-02-27
Payer: COMMERCIAL

## 2020-02-27 VITALS
DIASTOLIC BLOOD PRESSURE: 82 MMHG | SYSTOLIC BLOOD PRESSURE: 134 MMHG | HEIGHT: 63 IN | OXYGEN SATURATION: 97 % | WEIGHT: 212.5 LBS | TEMPERATURE: 98.2 F | BODY MASS INDEX: 37.65 KG/M2 | HEART RATE: 81 BPM

## 2020-02-27 DIAGNOSIS — R10.9 CHRONIC LEFT FLANK PAIN: ICD-10-CM

## 2020-02-27 DIAGNOSIS — E78.5 DYSLIPIDEMIA: ICD-10-CM

## 2020-02-27 DIAGNOSIS — I10 ACCELERATED ESSENTIAL HYPERTENSION: ICD-10-CM

## 2020-02-27 DIAGNOSIS — G89.29 CHRONIC LEFT FLANK PAIN: ICD-10-CM

## 2020-02-27 DIAGNOSIS — E11.9 TYPE 2 DIABETES MELLITUS WITHOUT COMPLICATION, WITHOUT LONG-TERM CURRENT USE OF INSULIN (HCC): Primary | ICD-10-CM

## 2020-02-27 DIAGNOSIS — E55.9 VITAMIN D DEFICIENCY: ICD-10-CM

## 2020-02-27 PROCEDURE — 2022F DILAT RTA XM EVC RTNOPTHY: CPT | Performed by: FAMILY MEDICINE

## 2020-02-27 PROCEDURE — 4040F PNEUMOC VAC/ADMIN/RCVD: CPT | Performed by: FAMILY MEDICINE

## 2020-02-27 PROCEDURE — 3044F HG A1C LEVEL LT 7.0%: CPT | Performed by: FAMILY MEDICINE

## 2020-02-27 PROCEDURE — 3079F DIAST BP 80-89 MM HG: CPT | Performed by: FAMILY MEDICINE

## 2020-02-27 PROCEDURE — 3075F SYST BP GE 130 - 139MM HG: CPT | Performed by: FAMILY MEDICINE

## 2020-02-27 PROCEDURE — 99214 OFFICE O/P EST MOD 30 MIN: CPT | Performed by: FAMILY MEDICINE

## 2020-02-27 PROCEDURE — 1036F TOBACCO NON-USER: CPT | Performed by: FAMILY MEDICINE

## 2020-02-27 PROCEDURE — 3008F BODY MASS INDEX DOCD: CPT | Performed by: FAMILY MEDICINE

## 2020-02-27 PROCEDURE — 1160F RVW MEDS BY RX/DR IN RCRD: CPT | Performed by: FAMILY MEDICINE

## 2020-02-28 NOTE — ASSESSMENT & PLAN NOTE
Cholesterol significantly more elevated  Discussed with her that there is likely dietary can bone it but most likely she has a significant familial component as well  Discussed with her restarting a statin medication  She did not tolerate the atorvastatin on a daily basis  Discussed options with her  Discussed trying the atorvastatin 2 days a week and then gradually increasing to 3 days a week if tolerated  Discussed signs and symptoms to watch for  Discussed especially watching for the lower extremity muscle aches which she had had previously  Discussed the starting of Zetia depending on how she responds to atorvastatin  Watch diet  Increase fiber in diet  Try to increase activity level

## 2020-02-28 NOTE — ASSESSMENT & PLAN NOTE
Blood pressure relatively well controlled  Continue with the losartan  Watch salt intake  Remain as active as possible  Stay adequately hydrated

## 2020-02-28 NOTE — ASSESSMENT & PLAN NOTE
Lab Results   Component Value Date    HGBA1C 6 0 (H) 02/24/2020     Hemoglobin A1c well controlled with diet alone  Continue watch diet  Carbohydrate intake reviewed  Continue to be as active as possible but this is difficult with her work schedule as well as family issues  Continue follow-up with Ophthalmology regularly  Follow up with Podiatry regularly

## 2020-02-28 NOTE — PROGRESS NOTES
Assessment/Plan:    Type 2 diabetes mellitus without complication, without long-term current use of insulin ([facility])    Lab Results   Component Value Date    HGBA1C 6 0 (H) 02/24/2020     Hemoglobin A1c well controlled with diet alone  Continue watch diet  Carbohydrate intake reviewed  Continue to be as active as possible but this is difficult with her work schedule as well as family issues  Continue follow-up with Ophthalmology regularly  Follow up with Podiatry regularly  Accelerated essential hypertension  Blood pressure relatively well controlled  Continue with the losartan  Watch salt intake  Remain as active as possible  Stay adequately hydrated  Vitamin D deficiency  Reviewed recent vitamin-D level with her  Continue with over-the-counter vitamin-D supplementation  Discussed with her taking 4000 units in the winter months in 2000 units in the summer months  Discussed prescription dosing of the vitamin-D which she had been on in the past   She declines this at present and wishes to use the over-the-counter variety  Dyslipidemia  Cholesterol significantly more elevated  Discussed with her that there is likely dietary can bone it but most likely she has a significant familial component as well  Discussed with her restarting a statin medication  She did not tolerate the atorvastatin on a daily basis  Discussed options with her  Discussed trying the atorvastatin 2 days a week and then gradually increasing to 3 days a week if tolerated  Discussed signs and symptoms to watch for  Discussed especially watching for the lower extremity muscle aches which she had had previously  Discussed the starting of Zetia depending on how she responds to atorvastatin  Watch diet  Increase fiber in diet  Try to increase activity level  Chronic left flank pain  Continues with the left flank/hip pain; she is very concerned about this especially with the extensive family history of cancer    Will check an ultrasound for further investigation especially to look at the right kidney area  Discussed possible causes with her  Most likely this is related to a musculoskeletal issue but should rule out internal issues as well  Watch for any worsening  Diagnoses and all orders for this visit:    Type 2 diabetes mellitus without complication, without long-term current use of insulin (HCC)  -     CBC and differential; Future  -     Comprehensive metabolic panel; Future  -     Hemoglobin A1C; Future  -     Lipid panel; Future  -     TSH, 3rd generation; Future    Accelerated essential hypertension  -     CBC and differential; Future  -     Comprehensive metabolic panel; Future  -     Hemoglobin A1C; Future  -     Lipid panel; Future  -     TSH, 3rd generation; Future    Dyslipidemia  -     CBC and differential; Future  -     Comprehensive metabolic panel; Future  -     Hemoglobin A1C; Future  -     Lipid panel; Future  -     TSH, 3rd generation; Future    Vitamin D deficiency  -     CBC and differential; Future  -     Comprehensive metabolic panel; Future  -     Hemoglobin A1C; Future  -     Lipid panel; Future  -     TSH, 3rd generation; Future    Chronic left flank pain  -     CBC and differential; Future  -     Comprehensive metabolic panel; Future  -     Hemoglobin A1C; Future  -     Lipid panel; Future  -     TSH, 3rd generation; Future  -     US abdomen complete; Future  -     XR sacroiliac joints 3+ views; Future        Orders and recommendations as noted above  Recent laboratory testing reviewed with her  Continue with mammograms yearly  Continue with bone densities every 2 years  She is up-to-date on her colonoscopy  She is up-to-date on her immunizations  Continue follow-up with Ophthalmology on a regular basis  Continue follow-up with Podiatry on a regular basis  Will have her follow up in about 3-5 months or sooner if needed  Subjective:      Patient ID: Ita Castillo is a 68 y o  female  She presents for routine follow-up  Has generally been doing relatively well  Continues with significant stressors especially with her son having the glioblastoma as well as her  having dementia  She does worry almost constantly  Has difficulty concentrating at times and difficulty sleeping at times because of this  Denies any nausea, vomiting, or diarrhea  Appetite has been stable  Denies any abdominal pain  She has stop the atorvastatin  She notice significant muscle aches into the legs with this  Has been trying to watch her diet somewhat  She is willing to retry statin medications if needed  Denies any chest pain or palpitations  Denies any significant shortness of breath  Continues to follow-up with Cardiology  Tolerating the losartan without difficulty  Denies any headaches or localized weakness  Denies any recent illnesses  Has been taking the over-the-counter vitamin-D as supplement  Denies any side effects from this  Continues with vitamin B12 supplementation as well  Still continues with the right flank/right low back pain  Symptoms seem to coming go  Does improve with the methocarbamol but pain returns  Still with difficulty with sleep  Usually can follow sleep but does not sleep for very long  She does continue to work about 6 days in a 2 week period  She is uncertain exactly when she is planning to retire  Vision has been stable  Denies any changes  The following portions of the patient's history were reviewed and updated as appropriate:   She  has a past medical history of Arthritis, Carpal tunnel syndrome, Cataract, Cerebral aneurysm, Decreased body height, Heart murmur, Hypercholesteremia, Hypertension, Motion sickness, Obesity, Periodic heart flutter, Pneumonia, PONV (postoperative nausea and vomiting), Rosacea, Vertigo, Wears glasses, and Wears partial dentures    She   Patient Active Problem List    Diagnosis Date Noted    Right flank pain 2020    Chronic left flank pain 2020    Sciatica of right side 2019    Elevated TSH 2019    Anxiety 2019    Abnormal mammogram 2018    Hyperhidrosis 2018    Grieving 2018    Abnormal electromyography 2018    Closed fracture of multiple ribs of left side with routine healing 2018    Fall 2018    Cerebral aneurysm 2018    Aortic stenosis, mild 2018    Complex endometrial hyperplasia 2017    Thickened endometrium 2017    Female pelvic pain 2017    Primary osteoarthritis of right knee 2016    Carpal tunnel syndrome 2016    Tendonitis of left hip 2015    Vitamin D deficiency 2015    Type 2 diabetes mellitus without complication, without long-term current use of insulin ([facility] [address] ) 2015    Dyslipidemia 2014    Osteopenia 2014    Accelerated essential hypertension 2013    Class 2 obesity due to excess calories with body mass index (BMI) of 36 0 to 36 9 in adult 2013    Fatigue 2012    Asymptomatic varicose veins 2012     She  has a past surgical history that includes Colonoscopy;  section; pr revise median n/carpal tunnel surg (Left, 2016); Carpal tunnel release (Bilateral); pr laparoscopy w tot hysterectuterus <=250 gram  w tube/ovary (N/A, 2017); IR cerebral angiography (10/12/2018); and IR image guided biopsy/aspiration lymph node (2018)  Her family history includes Arthritis in her mother; Colon cancer (age of onset: 78) in her sister; Diabetes in her father; Hodgkin's lymphoma (age of onset: 24) in her child; Hyperlipidemia in her sister; Hypertension in her mother; Stroke in her mother; Sudden death in her brother and father  She  reports that she has never smoked  She has never used smokeless tobacco  She reports that she does not drink alcohol or use drugs    Current Outpatient Medications   Medication Sig Dispense Refill    atorvastatin (LIPITOR) 20 mg tablet Take 1 tablet (20 mg total) by mouth daily at bedtime 90 tablet 3    Coenzyme Q10 (COQ-10) 100 MG CAPS Take by mouth daily      ergocalciferol (VITAMIN D2) 50,000 units Take 1 capsule (50,000 Units total) by mouth once a week 12 capsule 0    furosemide (LASIX) 20 mg tablet as needed      LORazepam (ATIVAN) 0 5 mg tablet Take 1 tablet (0 5 mg total) by mouth every 8 (eight) hours as needed for anxiety (Patient taking differently: Take 0 5 mg by mouth as needed for anxiety ) 60 tablet 0    losartan (COZAAR) 50 mg tablet Take 1 tablet (50 mg total) by mouth daily 90 tablet 3    methocarbamol (ROBAXIN) 750 mg tablet Take 1 tablet (750 mg total) by mouth every 6 (six) hours as needed for muscle spasms 100 tablet 1    metroNIDAZOLE (METROGEL) 0 75 % gel       naproxen (NAPROSYN) 500 mg tablet Take 1 tablet (500 mg total) by mouth 2 (two) times a day with meals 60 tablet 5     No current facility-administered medications for this visit        Current Outpatient Medications on File Prior to Visit   Medication Sig    atorvastatin (LIPITOR) 20 mg tablet Take 1 tablet (20 mg total) by mouth daily at bedtime    Coenzyme Q10 (COQ-10) 100 MG CAPS Take by mouth daily    ergocalciferol (VITAMIN D2) 50,000 units Take 1 capsule (50,000 Units total) by mouth once a week    furosemide (LASIX) 20 mg tablet as needed    LORazepam (ATIVAN) 0 5 mg tablet Take 1 tablet (0 5 mg total) by mouth every 8 (eight) hours as needed for anxiety (Patient taking differently: Take 0 5 mg by mouth as needed for anxiety )    losartan (COZAAR) 50 mg tablet Take 1 tablet (50 mg total) by mouth daily    methocarbamol (ROBAXIN) 750 mg tablet Take 1 tablet (750 mg total) by mouth every 6 (six) hours as needed for muscle spasms    metroNIDAZOLE (METROGEL) 0 75 % gel     naproxen (NAPROSYN) 500 mg tablet Take 1 tablet (500 mg total) by mouth 2 (two) times a day with meals     No current facility-administered medications on file prior to visit  She is allergic to penicillins       Review of Systems   Constitutional: Positive for fatigue  Negative for activity change, appetite change, chills and fever  HENT: Negative for congestion and rhinorrhea  Eyes: Negative for visual disturbance  Respiratory: Negative for chest tightness and shortness of breath  Cardiovascular: Negative for chest pain and palpitations  Gastrointestinal: Negative for abdominal pain, blood in stool, diarrhea, nausea and vomiting  Endocrine: Negative for polydipsia, polyphagia and polyuria  Genitourinary: Positive for flank pain  Negative for dysuria, frequency and urgency  Musculoskeletal: Positive for back pain  Negative for gait problem  Skin: Negative for color change  Neurological: Negative for dizziness and headaches  Hematological: Does not bruise/bleed easily  Psychiatric/Behavioral: Positive for sleep disturbance  Negative for confusion  The patient is nervous/anxious  Objective:      /82 (BP Location: Left arm, Patient Position: Sitting, Cuff Size: Large)   Pulse 81   Temp 98 2 °F (36 8 °C) (Temporal)   Ht 5' 3" (1 6 m)   Wt 96 4 kg (212 lb 8 oz)   LMP  (LMP Unknown)   SpO2 97%   BMI 37 64 kg/m²          Physical Exam   Constitutional: She is oriented to person, place, and time  No distress  HENT:   Head: Normocephalic and atraumatic  Mouth/Throat: Oropharynx is clear and moist    Eyes: Pupils are equal, round, and reactive to light  Conjunctivae are normal  Right eye exhibits no discharge  Left eye exhibits no discharge  Neck: Carotid bruit is not present  Cardiovascular: Normal rate, regular rhythm and normal heart sounds  Exam reveals no gallop and no friction rub  No murmur heard  Pulmonary/Chest: No respiratory distress  She has no wheezes  She has no rales  Abdominal: Bowel sounds are normal  She exhibits no distension  There is no tenderness  Musculoskeletal: She exhibits no edema  Degenerative changes bilateral knees   Lymphadenopathy:     She has no cervical adenopathy  Neurological: She is oriented to person, place, and time  Skin: Skin is warm and dry  Psychiatric: She has a normal mood and affect  Her behavior is normal    Nursing note and vitals reviewed

## 2020-02-28 NOTE — ASSESSMENT & PLAN NOTE
Continues with the left flank/hip pain; she is very concerned about this especially with the extensive family history of cancer  Will check an ultrasound for further investigation especially to look at the right kidney area  Discussed possible causes with her  Most likely this is related to a musculoskeletal issue but should rule out internal issues as well  Watch for any worsening

## 2020-02-28 NOTE — ASSESSMENT & PLAN NOTE
Reviewed recent vitamin-D level with her  Continue with over-the-counter vitamin-D supplementation  Discussed with her taking 4000 units in the winter months in 2000 units in the summer months  Discussed prescription dosing of the vitamin-D which she had been on in the past   She declines this at present and wishes to use the over-the-counter variety

## 2020-03-04 ENCOUNTER — HOSPITAL ENCOUNTER (OUTPATIENT)
Dept: ULTRASOUND IMAGING | Facility: HOSPITAL | Age: 70
Discharge: HOME | End: 2020-03-04
Payer: COMMERCIAL

## 2020-03-04 DIAGNOSIS — R10.9 RIGHT FLANK PAIN, CHRONIC: Primary | ICD-10-CM

## 2020-03-04 DIAGNOSIS — R10.9 RIGHT FLANK PAIN, CHRONIC: ICD-10-CM

## 2020-03-04 DIAGNOSIS — G89.29 RIGHT FLANK PAIN, CHRONIC: Primary | ICD-10-CM

## 2020-03-04 DIAGNOSIS — G89.29 RIGHT FLANK PAIN, CHRONIC: ICD-10-CM

## 2020-03-04 PROCEDURE — 76700 US EXAM ABDOM COMPLETE: CPT

## 2020-03-04 NOTE — PROGRESS NOTES
Patient called to let us know that the [address] [facility] had the wrong dx- it should be right sided flank pain, not left sided  Did correct  Patient is scheduled for today

## 2020-05-07 ENCOUNTER — APPOINTMENT (EMERGENCY)
Dept: RADIOLOGY | Facility: HOSPITAL | Age: 70
End: 2020-05-07
Payer: COMMERCIAL

## 2020-05-07 ENCOUNTER — HOSPITAL ENCOUNTER (EMERGENCY)
Facility: HOSPITAL | Age: 70
Discharge: HOME | End: 2020-05-07
Attending: EMERGENCY MEDICINE | Admitting: EMERGENCY MEDICINE
Payer: COMMERCIAL

## 2020-05-07 VITALS
HEIGHT: 63 IN | BODY MASS INDEX: 39.06 KG/M2 | HEART RATE: 75 BPM | WEIGHT: 220.46 LBS | SYSTOLIC BLOOD PRESSURE: 148 MMHG | TEMPERATURE: 97.6 F | RESPIRATION RATE: 17 BRPM | OXYGEN SATURATION: 97 % | DIASTOLIC BLOOD PRESSURE: 82 MMHG

## 2020-05-07 DIAGNOSIS — R00.2 PALPITATIONS: Primary | ICD-10-CM

## 2020-05-07 DIAGNOSIS — I10 ESSENTIAL HYPERTENSION: ICD-10-CM

## 2020-05-07 LAB
ANION GAP SERPL CALCULATED.3IONS-SCNC: 9 MMOL/L (ref 4–13)
ATRIAL RATE: 74 BPM
ATRIAL RATE: 81 BPM
BASOPHILS # BLD AUTO: 0.04 THOUSANDS/ΜL (ref 0–0.1)
BASOPHILS NFR BLD AUTO: 0 % (ref 0–1)
BUN SERPL-MCNC: 22 MG/DL (ref 5–25)
CALCIUM SERPL-MCNC: 9 MG/DL (ref 8.3–10.1)
CHLORIDE SERPL-SCNC: 106 MMOL/L (ref 100–108)
CO2 SERPL-SCNC: 27 MMOL/L (ref 21–32)
CREAT SERPL-MCNC: 0.98 MG/DL (ref 0.6–1.3)
EOSINOPHIL # BLD AUTO: 0.17 THOUSAND/ΜL (ref 0–0.61)
EOSINOPHIL NFR BLD AUTO: 2 % (ref 0–6)
ERYTHROCYTE [DISTWIDTH] IN BLOOD BY AUTOMATED COUNT: 13.2 % (ref 11.6–15.1)
GFR SERPL CREATININE-BSD FRML MDRD: 58 ML/MIN/1.73SQ M
GLUCOSE SERPL-MCNC: 91 MG/DL (ref 65–140)
HCT VFR BLD AUTO: 38.6 % (ref 34.8–46.1)
HGB BLD-MCNC: 12.5 G/DL (ref 11.5–15.4)
IMM GRANULOCYTES # BLD AUTO: 0.03 THOUSAND/UL (ref 0–0.2)
IMM GRANULOCYTES NFR BLD AUTO: 0 % (ref 0–2)
LYMPHOCYTES # BLD AUTO: 1.91 THOUSANDS/ΜL (ref 0.6–4.47)
LYMPHOCYTES NFR BLD AUTO: 20 % (ref 14–44)
MAGNESIUM SERPL-MCNC: 2 MG/DL (ref 1.6–2.6)
MCH RBC QN AUTO: 30.5 PG (ref 26.8–34.3)
MCHC RBC AUTO-ENTMCNC: 32.4 G/DL (ref 31.4–37.4)
MCV RBC AUTO: 94 FL (ref 82–98)
MONOCYTES # BLD AUTO: 0.67 THOUSAND/ΜL (ref 0.17–1.22)
MONOCYTES NFR BLD AUTO: 7 % (ref 4–12)
NEUTROPHILS # BLD AUTO: 6.68 THOUSANDS/ΜL (ref 1.85–7.62)
NEUTS SEG NFR BLD AUTO: 71 % (ref 43–75)
NRBC BLD AUTO-RTO: 0 /100 WBCS
P AXIS: 42 DEGREES
P AXIS: 63 DEGREES
PLATELET # BLD AUTO: 260 THOUSANDS/UL (ref 149–390)
PMV BLD AUTO: 9.7 FL (ref 8.9–12.7)
POTASSIUM SERPL-SCNC: 3.6 MMOL/L (ref 3.5–5.3)
PR INTERVAL: 172 MS
PR INTERVAL: 176 MS
QRS AXIS: 61 DEGREES
QRS AXIS: 66 DEGREES
QRSD INTERVAL: 104 MS
QRSD INTERVAL: 104 MS
QT INTERVAL: 386 MS
QT INTERVAL: 398 MS
QTC INTERVAL: 441 MS
QTC INTERVAL: 448 MS
RBC # BLD AUTO: 4.1 MILLION/UL (ref 3.81–5.12)
SODIUM SERPL-SCNC: 142 MMOL/L (ref 136–145)
T WAVE AXIS: 38 DEGREES
T WAVE AXIS: 47 DEGREES
TROPONIN I SERPL-MCNC: 0.02 NG/ML
TROPONIN I SERPL-MCNC: <0.02 NG/ML
VENTRICULAR RATE: 74 BPM
VENTRICULAR RATE: 81 BPM
WBC # BLD AUTO: 9.5 THOUSAND/UL (ref 4.31–10.16)

## 2020-05-07 PROCEDURE — 80048 BASIC METABOLIC PNL TOTAL CA: CPT | Performed by: EMERGENCY MEDICINE

## 2020-05-07 PROCEDURE — 84484 ASSAY OF TROPONIN QUANT: CPT | Performed by: EMERGENCY MEDICINE

## 2020-05-07 PROCEDURE — 85025 COMPLETE CBC W/AUTO DIFF WBC: CPT | Performed by: EMERGENCY MEDICINE

## 2020-05-07 PROCEDURE — 71045 X-RAY EXAM CHEST 1 VIEW: CPT

## 2020-05-07 PROCEDURE — 93005 ELECTROCARDIOGRAM TRACING: CPT

## 2020-05-07 PROCEDURE — 93010 ELECTROCARDIOGRAM REPORT: CPT | Performed by: INTERNAL MEDICINE

## 2020-05-07 PROCEDURE — 36415 COLL VENOUS BLD VENIPUNCTURE: CPT | Performed by: EMERGENCY MEDICINE

## 2020-05-07 PROCEDURE — 99285 EMERGENCY DEPT VISIT HI MDM: CPT

## 2020-05-07 PROCEDURE — 83735 ASSAY OF MAGNESIUM: CPT | Performed by: EMERGENCY MEDICINE

## 2020-05-07 PROCEDURE — 99284 EMERGENCY DEPT VISIT MOD MDM: CPT | Performed by: EMERGENCY MEDICINE

## 2020-05-07 RX ORDER — SODIUM CHLORIDE 9 MG/ML
3 INJECTION INTRAVENOUS
Status: DISCONTINUED | OUTPATIENT
Start: 2020-05-07 | End: 2020-05-07 | Stop reason: HOSPADM

## 2020-06-17 DIAGNOSIS — I10 HYPERTENSION: ICD-10-CM

## 2020-06-17 RX ORDER — LOSARTAN POTASSIUM 50 MG/1
TABLET ORAL
Qty: 90 TABLET | Refills: 1 | Status: SHIPPED | OUTPATIENT
Start: 2020-06-17 | End: 2020-11-05 | Stop reason: SDUPTHER

## 2020-07-15 ENCOUNTER — APPOINTMENT (OUTPATIENT)
Dept: LAB | Facility: HOSPITAL | Age: 70
End: 2020-07-15
Attending: NEUROLOGICAL SURGERY
Payer: COMMERCIAL

## 2020-07-15 DIAGNOSIS — Z01.818 PRE-PROCEDURAL EXAMINATION: ICD-10-CM

## 2020-07-15 LAB
BUN SERPL-MCNC: 15 MG/DL (ref 5–25)
CREAT SERPL-MCNC: 0.85 MG/DL (ref 0.6–1.3)
GFR SERPL CREATININE-BSD FRML MDRD: 69 ML/MIN/1.73SQ M

## 2020-07-15 PROCEDURE — 36415 COLL VENOUS BLD VENIPUNCTURE: CPT

## 2020-07-15 PROCEDURE — 82565 ASSAY OF CREATININE: CPT

## 2020-07-15 PROCEDURE — 84520 ASSAY OF UREA NITROGEN: CPT

## 2020-07-22 ENCOUNTER — HOSPITAL ENCOUNTER (OUTPATIENT)
Dept: CT IMAGING | Facility: HOSPITAL | Age: 70
Discharge: HOME | End: 2020-07-22
Attending: NEUROLOGICAL SURGERY
Payer: COMMERCIAL

## 2020-07-22 DIAGNOSIS — I67.1 CEREBRAL ANEURYSM: ICD-10-CM

## 2020-07-22 PROCEDURE — 70496 CT ANGIOGRAPHY HEAD: CPT

## 2020-07-22 RX ADMIN — IOHEXOL 100 ML: 350 INJECTION, SOLUTION INTRAVENOUS at 12:56

## 2020-07-29 DIAGNOSIS — R60.9 PERIPHERAL EDEMA: Primary | ICD-10-CM

## 2020-07-29 RX ORDER — FUROSEMIDE 20 MG/1
20 TABLET ORAL AS NEEDED
Qty: 30 TABLET | Refills: 5 | Status: SHIPPED | OUTPATIENT
Start: 2020-07-29 | End: 2020-08-27 | Stop reason: SDUPTHER

## 2020-08-10 ENCOUNTER — TRANSITIONAL CARE MANAGEMENT (OUTPATIENT)
Dept: INTERNAL MEDICINE CLINIC | Facility: CLINIC | Age: 70
End: 2020-08-10

## 2020-08-24 ENCOUNTER — TRANSCRIBE ORDERS (OUTPATIENT)
Dept: NEUROSURGERY | Facility: CLINIC | Age: 70
End: 2020-08-24

## 2020-08-24 ENCOUNTER — OFFICE VISIT (OUTPATIENT)
Dept: NEUROSURGERY | Facility: CLINIC | Age: 70
End: 2020-08-24
Payer: COMMERCIAL

## 2020-08-24 VITALS
DIASTOLIC BLOOD PRESSURE: 90 MMHG | SYSTOLIC BLOOD PRESSURE: 143 MMHG | HEIGHT: 63 IN | BODY MASS INDEX: 37.39 KG/M2 | TEMPERATURE: 98.4 F | WEIGHT: 211 LBS

## 2020-08-24 DIAGNOSIS — I67.1 CEREBRAL ANEURYSM: Primary | ICD-10-CM

## 2020-08-24 DIAGNOSIS — Z01.818 PRE-PROCEDURAL EXAMINATION: Primary | ICD-10-CM

## 2020-08-24 PROCEDURE — 4040F PNEUMOC VAC/ADMIN/RCVD: CPT | Performed by: PHYSICIAN ASSISTANT

## 2020-08-24 PROCEDURE — 3080F DIAST BP >= 90 MM HG: CPT | Performed by: PHYSICIAN ASSISTANT

## 2020-08-24 PROCEDURE — 3044F HG A1C LEVEL LT 7.0%: CPT | Performed by: PHYSICIAN ASSISTANT

## 2020-08-24 PROCEDURE — 3008F BODY MASS INDEX DOCD: CPT | Performed by: PHYSICIAN ASSISTANT

## 2020-08-24 PROCEDURE — 1036F TOBACCO NON-USER: CPT | Performed by: PHYSICIAN ASSISTANT

## 2020-08-24 PROCEDURE — 2022F DILAT RTA XM EVC RTNOPTHY: CPT | Performed by: PHYSICIAN ASSISTANT

## 2020-08-24 PROCEDURE — 3077F SYST BP >= 140 MM HG: CPT | Performed by: PHYSICIAN ASSISTANT

## 2020-08-24 PROCEDURE — 99214 OFFICE O/P EST MOD 30 MIN: CPT | Performed by: PHYSICIAN ASSISTANT

## 2020-08-24 PROCEDURE — 1160F RVW MEDS BY RX/DR IN RCRD: CPT | Performed by: PHYSICIAN ASSISTANT

## 2020-08-24 NOTE — PATIENT INSTRUCTIONS
Nonruptured Cerebral Aneurysm   WHAT YOU NEED TO KNOW:   What is a cerebral aneurysm? A cerebral aneurysm is a bulging or weak area in an artery that brings blood to your brain  The area fills with blood and expands  The aneurysm can expand so much that blood bursts through the artery  This is a hemorrhagic stroke  An aneurysm that has not burst can be managed or treated to prevent it from rupturing  An aneurysm can happen anywhere in your brain  They most commonly form between the brain and the base of the skull  What increases my risk for a cerebral aneurysm? · An abnormally shaped artery wall    · High blood pressure    · Atherosclerosis (hardening of the arteries) or a vascular disease    · A head injury    · Cigarettes or illegal drugs    · A family history of brain aneurysms    · Being a woman or taking birth control pills    · A connective tissue disorder, polycystic kidney disease, or a circulatory disorder    · Elderly age    · An infection or tumor  What are the signs and symptoms of a nonruptured cerebral aneurysm? Cerebral aneurysms usually have no signs or symptoms if they have not ruptured  A large nonruptured aneurysm can press on nerves and cause symptoms  The following symptoms may mean the aneurysm is at risk of rupturing:  · A headache in one area    · Pain above and behind one eye    · Dilated pupil in one eye    · Vision changes or double vision    · Numb or weak feeling in your face, or not being able to move one side of your face    · Eyelid drooping    · Nausea or vomiting  How is a nonruptured cerebral aneurysm diagnosed? A nonruptured aneurysm is usually found during tests for another condition  Your healthcare provider may ask about your symptoms and if you have a family history of aneurysms  If you have 2 or more family members with aneurysms, you should be tested  Tell him about all medicines you take, including blood thinners    · An MRI  may be used to take pictures of weak or bulging areas in blood vessels  The pictures may show the size and location of the aneurysm  Do not enter the MRI room with anything metal  Metal can cause serious damage  Tell the healthcare provider if you have any metal in or on your body  · A computed tomography angiography (CTA) scan  may be used to take detailed pictures of the aneurysm  · An angiogram  is an x-ray picture of the blood vessels  Contrast liquid is used to help the blood vessels show up better in the pictures  Tell the healthcare provider if you have ever had an allergic reaction to contrast liquid  How is a nonruptured cerebral aneurysm treated? · An aneurysm that has not ruptured may not need to be treated  Your healthcare provider may check it regularly  He may order tests that check the size of your aneurysm  Treatment may be used to prevent a rupture if the aneurysm becomes large or you have symptoms  You may also need treatment if you have a family history of ruptured aneurysms  · The type of treatment depends on the size and location of the aneurysm  Your healthcare provider may fill the aneurysm with coils or other devices  He may move blood flow away from the aneurysm  You may need surgery to have a small metal clip placed around the base of the aneurysm  The clip stays in place permanently to keep blood out of the aneurysm  What can I do to manage a nonruptured cerebral aneurysm? A cerebral aneurysm cannot be prevented, but the following can help you lower the risk that it will rupture:  · Control high blood pressure  Keep your blood pressure at the level your healthcare provider recommends  If you are a woman, ask your healthcare provider if birth control pills are safe for you  Birth control pills can also increase blood pressure  · Do not smoke  Nicotine can damage blood vessels and make it more difficult to manage your blood pressure   Do not use e-cigarettes or smokeless tobacco in place of cigarettes or to help you quit  They still contain nicotine  Ask your healthcare provider for information if you currently smoke and need help quitting  · Do not drink alcohol or use illegal drugs  Alcohol and illegal drugs such as cocaine can increase your blood pressure  Ask your healthcare provider for information if you need help quitting  · Eat a variety of healthy foods  Healthy foods include fruits, vegetables, whole-grain breads, beans, lean meats, fish, and low-fat dairy products  Your healthcare provider or dietitian can help you create a meal plan to help control high blood pressure or cholesterol  You may also need to limit sodium (salt)  · Exercise as directed  Exercise can help control your blood pressure and cholesterol level  Ask your healthcare provider how much exercise you need each day and which exercises are best for you  Call 911 for any of the following:   · You have any of the following signs of a stroke:      ¨ Numbness or drooping on one side of your face     ¨ Weakness in an arm or leg    ¨ Confusion or difficulty speaking    ¨ Dizziness, a severe headache, or vision loss    · You lose consciousness or have a seizure  When should I seek immediate care? · You have a stiff neck or trouble walking  · You have nausea or are vomiting  · You have blurred or double vision, or you are sensitive to light  · You suddenly feel weak  When should I contact my healthcare provider? · You have questions or concerns about your condition or care  CARE AGREEMENT:   You have the right to help plan your care  Learn about your health condition and how it may be treated  Discuss treatment options with your caregivers to decide what care you want to receive  You always have the right to refuse treatment  The above information is an  only  It is not intended as medical advice for individual conditions or treatments   Talk to your doctor, nurse or pharmacist before following any medical regimen to see if it is safe and effective for you  © 2017 [address] [employer] Information is for End User's use only and may not be sold, redistributed or otherwise used for commercial purposes  All illustrations and images included in CareNotes® are the copyrighted property of [employer]  or Trevor Valverde

## 2020-08-24 NOTE — PROGRESS NOTES
Neurosurgery Office Note  Conchita Ratliff 65 y o  female MRN: [id number]      Assessment/Plan     Cerebral aneurysm  · Presents for 1 year follow up of known L MCA aneurysm  · No acute changes over the past year  Imaging:   · CTA 8/24/2020: Stable appearance of 6 mm left MCA trifurcation aneurysm  No additional aneurysms are identified  Plan:   · Reviewed natural course of history around aneurysms  Also reviewed patients surgical options based on previous discussions with patient per Dr Laura Kaufman  · Also reviewed red flags symptoms and when to seek medical attention immediately  · Will continue to proceed with ongoing monitoring at this time as treatment would be difficult and complicated carrying increased risks  · Patient and daughter asked appropriate questions and all were answered at this time  · Follow up in 1 year with CTA  Order placed in chart  · Encouraged to call with questions or concerns sooner  Diagnoses and all orders for this visit:    Cerebral aneurysm  -     CTA head w wo contrast; Future            CHIEF COMPLAINT    Chief Complaint   Patient presents with    Follow-up     Cerebral aneurysm       HISTORY    History of Present Illness     65y o  year old female     Who presents to the office today for a 1 year follow up with CTA of the head for ongoing surveillance of her L MCA aneurysm  Patient states that she has been good over the past year without difficulties, or new symptoms  Patient daughter expressed concern that patient should mention that when she turns her head to the right she has some right sided scalp pain  This may be attributed to patient sleeping in a reclined position at home  Otherwise no new issues or concerns  Answered appropriate questions from patients daughter  Reviewed red flag symptoms to monitor for in the event that she has a rupture of her aneurysm and to seek medical attention immediately  No further questions at this time   Patient denies any changes in vision, trouble speech, facial weakness, changes in hearing, chest pain, shortness of breath, abdominal pain, weakness, tingling, numbness  She admits to periodic mild headaches as well as some mild lightheadedness but appears random in nature with spontaneous resolution at times  See Discussion    REVIEW OF SYSTEMS    Review of Systems   Constitutional: Negative  HENT: Negative  Eyes: Negative  Respiratory: Negative  Cardiovascular: Negative  Gastrointestinal: Negative  Endocrine: Negative  Genitourinary: Negative  Musculoskeletal: Positive for neck pain  Allergic/Immunologic: Negative  Neurological: Positive for light-headedness and headaches  Hematological: Negative  Psychiatric/Behavioral: Negative  Meds/Allergies     Current Outpatient Medications   Medication Sig Dispense Refill    Coenzyme Q10 (COQ-10) 100 MG CAPS Take by mouth daily      furosemide (LASIX) 20 mg tablet Take 1 tablet (20 mg total) by mouth as needed (swelling) 30 tablet 5    losartan (COZAAR) 50 mg tablet TAKE 1 TABLET DAILY 90 tablet 1    metroNIDAZOLE (METROGEL) 0 75 % gel       LORazepam (ATIVAN) 0 5 mg tablet Take 1 tablet (0 5 mg total) by mouth every 8 (eight) hours as needed for anxiety (Patient not taking: Reported on 8/24/2020) 60 tablet 0    methocarbamol (ROBAXIN) 750 mg tablet Take 1 tablet (750 mg total) by mouth every 6 (six) hours as needed for muscle spasms (Patient not taking: Reported on 8/24/2020) 100 tablet 1    naproxen (NAPROSYN) 500 mg tablet Take 1 tablet (500 mg total) by mouth 2 (two) times a day with meals (Patient not taking: Reported on 8/24/2020) 60 tablet 5     No current facility-administered medications for this visit          Allergies   Allergen Reactions    Penicillins Hives and Rash       PAST HISTORY    Past Medical History:   Diagnosis Date    Arthritis     Carpal tunnel syndrome     unspecified laterality    Cataract     Cerebral aneurysm     Decreased body height     last assessed 3/17/14    Heart murmur     Hypercholesteremia     Hypertension     Motion sickness     Obesity     Periodic heart flutter     Pneumonia     x1    PONV (postoperative nausea and vomiting)     Rosacea     Vertigo     Wears glasses     Wears partial dentures     upper       Past Surgical History:   Procedure Laterality Date    CARPAL TUNNEL RELEASE Bilateral      SECTION      x1    COLONOSCOPY      IR BIOPSY LYMPH NODE  2018    IR CEREBRAL ANGIOGRAPHY  10/12/2018    AR LAPAROSCOPY W TOT HYSTERECTUTERUS <=250 GRAM  W TUBE/OVARY N/A 2017    Procedure: HYSTERECTOMY LAPAROSCOPIC TOTAL, BSO, Cystoscopy;  Surgeon: Martha Kim MD;  Location: [facility];  Service: Gynecology    AR REVISE MEDIAN N/CARPAL TUNNEL SURG Left 2016    Procedure: RELEASE CARPAL TUNNEL;  Surgeon: Jani Shepherd MD;  Location: [facility];  Service: Orthopedics       Social History     Tobacco Use    Smoking status: Never Smoker    Smokeless tobacco: Never Used   Substance Use Topics    Alcohol use: No    Drug use: No       Family History   Problem Relation Age of Onset    Arthritis Mother     Hypertension Mother     Stroke Mother         syndrome    Diabetes Father     Sudden death Father         instantaneous cardiac    Hyperlipidemia Sister     Colon cancer Sister 78    Sudden death Brother         instananeous cardiac    Hodgkin's lymphoma Child 21         Above history personally reviewed  EXAM    Vitals:Blood pressure 143/90, temperature 98 4 °F (36 9 °C), temperature source Temporal, height 5' 3" (1 6 m), weight 95 7 kg (211 lb)  ,Body mass index is 37 38 kg/m²  Physical Exam  Constitutional:       Appearance: Normal appearance  She is well-developed  HENT:      Head: Normocephalic and atraumatic  Eyes:      Conjunctiva/sclera: Conjunctivae normal       Pupils: Pupils are equal, round, and reactive to light     Neck: Musculoskeletal: Normal range of motion and neck supple  Vascular: No JVD  Trachea: No tracheal deviation  Cardiovascular:      Rate and Rhythm: Normal rate  Pulmonary:      Effort: Pulmonary effort is normal  No respiratory distress  Abdominal:      General: There is no distension  Palpations: Abdomen is soft  Musculoskeletal: Normal range of motion  General: No deformity  Skin:     General: Skin is warm and dry  Neurological:      Mental Status: She is alert and oriented to person, place, and time  Cranial Nerves: No cranial nerve deficit  Sensory: No sensory deficit  Motor: No weakness  Gait: Gait is intact  Deep Tendon Reflexes: Reflexes are normal and symmetric  Reflex Scores:       Bicep reflexes are 2+ on the right side and 2+ on the left side  Brachioradialis reflexes are 2+ on the right side and 2+ on the left side  Patellar reflexes are 2+ on the right side and 2+ on the left side  Psychiatric:         Speech: Speech normal          Behavior: Behavior normal          Thought Content: Thought content normal          Neurologic Exam     Mental Status   Oriented to person, place, and time  Follows 2 step commands  Attention: normal  Concentration: normal    Speech: speech is normal   Level of consciousness: alert  Knowledge: good  Able to repeat  Cranial Nerves     CN III, IV, VI   Pupils are equal, round, and reactive to light  Upgaze: normal  Downgaze: normal    CN V   Facial sensation intact  CN VII   Facial expression full, symmetric       CN VIII   CN VIII normal    Hearing: intact    CN XI   CN XI normal      CN XII   CN XII normal    Tongue deviation: none    Motor Exam   Muscle bulk: normal  Overall muscle tone: normal  Right arm tone: normal  Left arm tone: normal  Right arm pronator drift: absent  Left arm pronator drift: absent  Right leg tone: normal  Left leg tone: normal    Strength   Right deltoid: 5/5  Left deltoid: 5/5  Right biceps: 5/5  Left biceps: 5/5  Right triceps: 5/5  Left triceps: 5/5  Right wrist flexion: 5/5  Left wrist flexion: 5/5  Right wrist extension: 55  Left wrist extension:   Right interossei: 55  Left interossei:   Right iliopsoas:   Left iliopsoas:   Right quadriceps:   Left quadriceps:   Right hamstrin/5  Left hamstrin/5  Right anterior tibial:   Left anterior tibial:   Right gastroc:   Left gastroc:     Sensory Exam   Light touch normal    DST intact      Gait, Coordination, and Reflexes     Gait  Gait: normal    Tremor   Resting tremor: absent  Action tremor: absent    Reflexes   Right brachioradialis: 2+  Left brachioradialis: 2+  Right biceps: 2+  Left biceps: 2+  Right patellar: 2+  Left patellar: 2+        MEDICAL DECISION MAKING    Imaging Studies:     No results found  I have personally reviewed pertinent reports     and I have personally reviewed pertinent films in PACS

## 2020-08-24 NOTE — ASSESSMENT & PLAN NOTE
· Presents for 1 year follow up of known L MCA aneurysm  · No acute changes over the past year  Imaging:   · CTA 8/24/2020: Stable appearance of 6 mm left MCA trifurcation aneurysm  No additional aneurysms are identified  Plan:   · Reviewed natural course of history around aneurysms  Also reviewed patients surgical options based on previous discussions with patient per Dr Kika Rocha  · Also reviewed red flags symptoms and when to seek medical attention immediately  · Will continue to proceed with ongoing monitoring at this time as treatment would be difficult and complicated carrying increased risks  · Patient and daughter asked appropriate questions and all were answered at this time  · Follow up in 1 year with CTA  Order placed in chart  · Encouraged to call with questions or concerns sooner

## 2020-08-27 DIAGNOSIS — R60.9 PERIPHERAL EDEMA: ICD-10-CM

## 2020-08-28 RX ORDER — FUROSEMIDE 20 MG/1
20 TABLET ORAL AS NEEDED
Qty: 90 TABLET | Refills: 3 | Status: SHIPPED | OUTPATIENT
Start: 2020-08-28 | End: 2021-02-17

## 2020-09-16 ENCOUNTER — OFFICE VISIT (OUTPATIENT)
Dept: INTERNAL MEDICINE CLINIC | Facility: CLINIC | Age: 70
End: 2020-09-16
Payer: COMMERCIAL

## 2020-09-16 VITALS
BODY MASS INDEX: 37.07 KG/M2 | WEIGHT: 209.2 LBS | DIASTOLIC BLOOD PRESSURE: 100 MMHG | HEART RATE: 75 BPM | TEMPERATURE: 97.8 F | SYSTOLIC BLOOD PRESSURE: 165 MMHG | OXYGEN SATURATION: 97 % | HEIGHT: 63 IN

## 2020-09-16 DIAGNOSIS — E11.9 TYPE 2 DIABETES MELLITUS WITHOUT COMPLICATION, WITHOUT LONG-TERM CURRENT USE OF INSULIN (HCC): Primary | ICD-10-CM

## 2020-09-16 DIAGNOSIS — I10 ACCELERATED ESSENTIAL HYPERTENSION: ICD-10-CM

## 2020-09-16 DIAGNOSIS — Z23 NEED FOR INFLUENZA VACCINATION: ICD-10-CM

## 2020-09-16 DIAGNOSIS — E78.5 DYSLIPIDEMIA: ICD-10-CM

## 2020-09-16 PROCEDURE — G0008 ADMIN INFLUENZA VIRUS VAC: HCPCS | Performed by: FAMILY MEDICINE

## 2020-09-16 PROCEDURE — 99214 OFFICE O/P EST MOD 30 MIN: CPT | Performed by: FAMILY MEDICINE

## 2020-09-16 PROCEDURE — 90662 IIV NO PRSV INCREASED AG IM: CPT | Performed by: FAMILY MEDICINE

## 2020-09-17 NOTE — PROGRESS NOTES
Assessment/Plan:    No problem-specific Assessment & Plan notes found for this encounter  Diagnoses and all orders for this visit:    Type 2 diabetes mellitus without complication, without long-term current use of insulin (HCC)    Accelerated essential hypertension    Dyslipidemia    Need for influenza vaccination  -     influenza vaccine, high-dose, PF 0 7 mL (FLUZONE HIGH-DOSE)        Orders recommendations as noted above  Slip given for laboratory testing  She will get this done over the next few weeks  Continue off the atorvastatin for now  Discussed with her likely restarting this even on an intermittent dosing 2-3 days a week if her laboratory testing warrants this  Blood pressure elevated in the office today  It did come down significantly to 152/92 on recheck  Follow this at home  She feels that is because of the recent stressors  Continue with the losartan  Lasix for the edema if needed  Watch salt intake  Stay adequately hydrated  Continue follow carbohydrate intake  Will check hemoglobin A1c with next laboratory testing  Watch diet  Decrease carbohydrate intake  Increase activity level of tolerates  Watch to be very careful to avoid falls  Continue follow-up with neuro surgery yearly for the aneurysms  Watch for any headaches  Methods for stress relief discussed  Recommend mammograms yearly  Recommend bone densities every other year  Continue colorectal cancer screening recommended  Methods for stress leave for caregivers discussed  Flu shot given  Up-to-date on pneumonia vaccines  Will have her follow up in about 4-6 months or sooner if needed  Subjective:      Patient ID: Neysa Scheuermann is a 65 y o  female  She presents for follow-up  Has generally been doing well  She finally retired completely  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    She does feel stressed at times because of this but also because her son has of glioblastoma and has required numerous surgeries recently  Feels like she cannot relax at times  Is always waiting for the next bad news    Still with joint aches especially into the knees and back  Tries to remain as active as possible  Continues with sleep issues which she feels is multifactorial   Tolerating losartan without difficulty  Denies any significant headaches or localized weakness  She has stopped the atorvastatin since she did not tolerate this  Has not had laboratory testing done prior to this visit  Occasional peripheral it edema  Appetite has been stable  Denies any nausea, vomiting, or diarrhea  Denies any changes in bowel movements  The following portions of the patient's history were reviewed and updated as appropriate: She  has a past medical history of Arthritis, Carpal tunnel syndrome, Cataract, Cerebral aneurysm, Decreased body height, Heart murmur, Hypercholesteremia, Hypertension, Motion sickness, Obesity, Periodic heart flutter, Pneumonia, PONV (postoperative nausea and vomiting), Rosacea, Vertigo, Wears glasses, and Wears partial dentures    She   Patient Active Problem List    Diagnosis Date Noted    Right flank pain 02/27/2020    Chronic left flank pain 02/27/2020    Sciatica of right side 12/11/2019    Elevated TSH 08/12/2019    Anxiety 01/08/2019    Abnormal mammogram 12/07/2018    Hyperhidrosis 09/06/2018    Grieving 09/06/2018    Abnormal electromyography 07/17/2018    Closed fracture of multiple ribs of left side with routine healing 07/04/2018    Fall 07/04/2018    Cerebral aneurysm 07/04/2018    Aortic stenosis, mild 03/19/2018    Complex endometrial hyperplasia 06/23/2017    Thickened endometrium 06/01/2017    Female pelvic pain 04/03/2017    Primary osteoarthritis of right knee 05/31/2016    Carpal tunnel syndrome 04/08/2016    Tendonitis of left hip 06/11/2015    Vitamin D deficiency 06/11/2015  Type 2 diabetes mellitus without complication, without long-term current use of insulin ([facility] [address] ) 2015    Dyslipidemia 2014    Osteopenia 2014    Accelerated essential hypertension 2013    Class 2 severe obesity due to excess calories with serious comorbidity and body mass index (BMI) of 37 0 to 37 9 in adult [facility]) 2013    Fatigue 2012    Asymptomatic varicose veins 2012     She  has a past surgical history that includes Colonoscopy;  section; pr revise median n/carpal tunnel surg (Left, 2016); Carpal tunnel release (Bilateral); pr laparoscopy w tot hysterectuterus <=250 gram  w tube/ovary (N/A, 2017); IR cerebral angiography (10/12/2018); and IR biopsy lymph node (2018)  Her family history includes Arthritis in her mother; Colon cancer (age of onset: 78) in her sister; Diabetes in her father; Hodgkin's lymphoma (age of onset: 24) in her child; Hyperlipidemia in her sister; Hypertension in her mother; Stroke in her mother; Sudden death in her brother and father  She  reports that she has never smoked  She has never used smokeless tobacco  She reports that she does not drink alcohol or use drugs    Current Outpatient Medications   Medication Sig Dispense Refill    Coenzyme Q10 (COQ-10) 100 MG CAPS Take by mouth daily      furosemide (LASIX) 20 mg tablet Take 1 tablet (20 mg total) by mouth as needed (swelling) 90 tablet 3    losartan (COZAAR) 50 mg tablet TAKE 1 TABLET DAILY 90 tablet 1    metroNIDAZOLE (METROGEL) 0 75 % gel       LORazepam (ATIVAN) 0 5 mg tablet Take 1 tablet (0 5 mg total) by mouth every 8 (eight) hours as needed for anxiety (Patient not taking: Reported on 2020) 60 tablet 0    methocarbamol (ROBAXIN) 750 mg tablet Take 1 tablet (750 mg total) by mouth every 6 (six) hours as needed for muscle spasms (Patient not taking: Reported on 2020) 100 tablet 1    naproxen (NAPROSYN) 500 mg tablet Take 1 tablet (500 mg total) by mouth 2 (two) times a day with meals (Patient not taking: Reported on 8/24/2020) 60 tablet 5     No current facility-administered medications for this visit  Current Outpatient Medications on File Prior to Visit   Medication Sig    Coenzyme Q10 (COQ-10) 100 MG CAPS Take by mouth daily    furosemide (LASIX) 20 mg tablet Take 1 tablet (20 mg total) by mouth as needed (swelling)    losartan (COZAAR) 50 mg tablet TAKE 1 TABLET DAILY    metroNIDAZOLE (METROGEL) 0 75 % gel     LORazepam (ATIVAN) 0 5 mg tablet Take 1 tablet (0 5 mg total) by mouth every 8 (eight) hours as needed for anxiety (Patient not taking: Reported on 8/24/2020)    methocarbamol (ROBAXIN) 750 mg tablet Take 1 tablet (750 mg total) by mouth every 6 (six) hours as needed for muscle spasms (Patient not taking: Reported on 8/24/2020)    naproxen (NAPROSYN) 500 mg tablet Take 1 tablet (500 mg total) by mouth 2 (two) times a day with meals (Patient not taking: Reported on 8/24/2020)     No current facility-administered medications on file prior to visit  She is allergic to penicillins       Review of Systems   Constitutional: Positive for fatigue  Negative for activity change, appetite change, chills and fever  HENT: Negative for congestion and rhinorrhea  Eyes: Negative for visual disturbance  Respiratory: Negative for chest tightness and shortness of breath  Cardiovascular: Negative for chest pain and palpitations  Gastrointestinal: Negative for abdominal pain, blood in stool, diarrhea, nausea and vomiting  Endocrine: Negative for polydipsia, polyphagia and polyuria  Genitourinary: Negative for dysuria, frequency and urgency  Musculoskeletal: Positive for arthralgias and myalgias  Negative for gait problem  Skin: Negative for color change  Neurological: Negative for dizziness and headaches  Hematological: Does not bruise/bleed easily  Psychiatric/Behavioral: Negative for confusion and sleep disturbance  The patient is not nervous/anxious  Objective:      /100   Pulse 75   Temp 97 8 °F (36 6 °C)   Ht 5' 3" (1 6 m)   Wt 94 9 kg (209 lb 3 2 oz)   LMP  (LMP Unknown)   SpO2 97%   BMI 37 06 kg/m²          Physical Exam  Constitutional:       General: She is not in acute distress  HENT:      Head: Normocephalic and atraumatic  Eyes:      General:         Right eye: No discharge  Left eye: No discharge  Conjunctiva/sclera: Conjunctivae normal       Pupils: Pupils are equal, round, and reactive to light  Cardiovascular:      Rate and Rhythm: Normal rate and regular rhythm  Heart sounds: Normal heart sounds  No murmur  No friction rub  No gallop  Pulmonary:      Effort: No respiratory distress  Breath sounds: No wheezing or rales  Abdominal:      General: Bowel sounds are normal  There is no distension  Tenderness: There is no abdominal tenderness  Lymphadenopathy:      Cervical: No cervical adenopathy  Skin:     General: Skin is warm and dry  Neurological:      Mental Status: She is alert and oriented to person, place, and time  Psychiatric:         Behavior: Behavior normal  Behavior is cooperative

## 2020-09-23 ENCOUNTER — OFFICE VISIT (OUTPATIENT)
Dept: CARDIOLOGY CLINIC | Facility: HOSPITAL | Age: 70
End: 2020-09-23
Payer: COMMERCIAL

## 2020-09-23 VITALS
HEIGHT: 63 IN | DIASTOLIC BLOOD PRESSURE: 84 MMHG | BODY MASS INDEX: 36.68 KG/M2 | WEIGHT: 207 LBS | HEART RATE: 76 BPM | SYSTOLIC BLOOD PRESSURE: 144 MMHG | OXYGEN SATURATION: 98 %

## 2020-09-23 DIAGNOSIS — I49.3 PREMATURE VENTRICULAR CONTRACTIONS (PVCS) (VPCS): ICD-10-CM

## 2020-09-23 DIAGNOSIS — E78.5 DYSLIPIDEMIA: ICD-10-CM

## 2020-09-23 DIAGNOSIS — I35.0 AORTIC STENOSIS, MILD: ICD-10-CM

## 2020-09-23 DIAGNOSIS — E66.01 CLASS 2 SEVERE OBESITY DUE TO EXCESS CALORIES WITH SERIOUS COMORBIDITY AND BODY MASS INDEX (BMI) OF 36.0 TO 36.9 IN ADULT (HCC): ICD-10-CM

## 2020-09-23 DIAGNOSIS — I10 ACCELERATED ESSENTIAL HYPERTENSION: ICD-10-CM

## 2020-09-23 PROCEDURE — 99214 OFFICE O/P EST MOD 30 MIN: CPT | Performed by: INTERNAL MEDICINE

## 2020-09-23 RX ORDER — EZETIMIBE 10 MG/1
10 TABLET ORAL DAILY
Qty: 90 TABLET | Refills: 3 | Status: SHIPPED | OUTPATIENT
Start: 2020-09-23 | End: 2021-02-17

## 2020-09-23 NOTE — PROGRESS NOTES
Cardiology Follow Up    Delaney High  1950  [id number]  [address] [place] [address] [facility] [place] [address] [address]    1  Accelerated essential hypertension     2  Dyslipidemia  ezetimibe (ZETIA) 10 mg tablet   3  Aortic stenosis, mild     4  Premature ventricular contractions (PVCs) (VPCs)     5  Class 2 severe obesity due to excess calories with serious comorbidity and body mass index (BMI) of 36 0 to 36 9 in adult [facility])         Discussion/Summary:   Mrs Demetrius Arguelles is a pleasant 40-year-old female who presents to the office for routine follow-up  Since her last visit she has been feeling well  She offers no cardiac complaints today      Her blood pressure is high in the office today  It was elevated when she saw her primary care provider and also her neurosurgeon in the recent past   I discussed escalation of losartan which I recommended  She wishes to monitor her blood pressure at home with her home monitoring device  She will forward the readings to the office for my review in a few weeks  In the meantime a low-salt diet was reinforced  Her most recent lipids were reviewed  Her LDL is suboptimal   She is only tolerating atorvastatin a few days a week  She is agreeable to the addition of Zetia  Her lipids are to be reassessed in a few months by her primary care physician and I will await the results  We discussed a repeat echocardiogram after her next visit for re-evaluation of her aortic valve disease      I'll see her back in the office in one year or sooner if deemed necessary  Interval History:   Mrs Demetrius Arguelles is a pleasant 40-year-old female who presents to the office today for routine follow-up  Since her last visit she has been feeling well  She has been exercising on a regular basis  She walks for two miles around a track with her daughter    In doing so she denies any cardiopulmonary symptoms of chest pain or shortness of breath  She denies any signs or symptoms of congestive heart failure including increasing lower extremity edema, paroxysmal nocturnal dyspnea, orthopnea, acute weight gain or increasing abdominal girth  She denies any lightheadedness, syncope or presyncope  Occasionally she will note a fluttering sensation in her chest   This brought her to the emergency department a few months ago and her ECG revealed PVCs  She has not had any recurrent symptoms since that time  She denies any sustained palpitations  She denies any symptoms of claudication      She is tolerating atorvastatin only a few days a week due to myalgias      Problem List     Carpal tunnel syndrome on left    Accelerated essential hypertension    Dyslipidemia    Class 2 obesity due to excess calories with body mass index (BMI) of 36 0 to 36 9 in adult    Aortic stenosis, mild    Complex endometrial hyperplasia    Fatigue    Female pelvic pain    Type 2 diabetes mellitus without complication, without long-term current use of insulin (HCC)    Insomnia    Osteopenia    Primary osteoarthritis of right knee    Snoring    Systolic ejection murmur    Tendonitis of left hip    Thickened endometrium    Asymptomatic varicose veins    Vitamin D deficiency        Past Medical History:   Diagnosis Date    Arthritis     Carpal tunnel syndrome     unspecified laterality    Cataract     Cerebral aneurysm     Decreased body height     last assessed 3/17/14    Heart murmur     Hypercholesteremia     Hypertension     Motion sickness     Obesity     Periodic heart flutter     Pneumonia     x1    PONV (postoperative nausea and vomiting)     Rosacea     Vertigo     Wears glasses     Wears partial dentures     upper     Social History     Socioeconomic History    Marital status: /Civil Union     Spouse name: Not on file    Number of children: Not on file    Years of education: Not on file    Highest education level: Not on file   Occupational History    Not on file   Social Needs    Financial resource strain: Not on file    Food insecurity     Worry: Not on file     Inability: Not on file    Transportation needs     Medical: Not on file     Non-medical: Not on file   Tobacco Use    Smoking status: Never Smoker    Smokeless tobacco: Never Used   Substance and Sexual Activity    Alcohol use: No    Drug use: No    Sexual activity: Not on file   Lifestyle    Physical activity     Days per week: Not on file     Minutes per session: Not on file    Stress: Not on file   Relationships    Social connections     Talks on phone: Not on file     Gets together: Not on file     Attends [employer] service: Not on file     Active member of club or organization: Not on file     Attends meetings of clubs or organizations: Not on file     Relationship status: Not on file    Intimate partner violence     Fear of current or ex partner: Not on file     Emotionally abused: Not on file     Physically abused: Not on file     Forced sexual activity: Not on file   Other Topics Concern    Not on file   Social History Narrative    Stress at work      Family History   Problem Relation Age of Onset    Arthritis Mother     Hypertension Mother     Stroke Mother         syndrome    Diabetes Father     Sudden death Father         instantaneous cardiac    Hyperlipidemia Sister     Colon cancer Sister 78    Sudden death Brother         instananeous cardiac    Hodgkin's lymphoma Child 21     Past Surgical History:   Procedure Laterality Date    CARPAL TUNNEL RELEASE Bilateral      SECTION      x1    COLONOSCOPY      IR BIOPSY LYMPH NODE  2018    IR CEREBRAL ANGIOGRAPHY  10/12/2018    ID LAPAROSCOPY W TOT HYSTERECTUTERUS <=250 GRAM  W TUBE/OVARY N/A 2017    Procedure: HYSTERECTOMY LAPAROSCOPIC TOTAL, BSO, Cystoscopy;  Surgeon: Jordan Covarrubias MD;  Location: [facility];  Service: Gynecology    LA REVISE MEDIAN N/CARPAL TUNNEL SURG Left 4/8/2016    Procedure: RELEASE CARPAL TUNNEL;  Surgeon: Bijan Bautista MD;  Location: [facility];  Service: Orthopedics       Current Outpatient Medications:     Coenzyme Q10 (COQ-10) 100 MG CAPS, Take by mouth daily, Disp: , Rfl:     losartan (COZAAR) 50 mg tablet, TAKE 1 TABLET DAILY, Disp: 90 tablet, Rfl: 1    metroNIDAZOLE (METROGEL) 0 75 % gel, , Disp: , Rfl:     ezetimibe (ZETIA) 10 mg tablet, Take 1 tablet (10 mg total) by mouth daily, Disp: 90 tablet, Rfl: 3    furosemide (LASIX) 20 mg tablet, Take 1 tablet (20 mg total) by mouth as needed (swelling), Disp: 90 tablet, Rfl: 3  Allergies   Allergen Reactions    Penicillins Hives and Rash       Labs:     Chemistry        Component Value Date/Time     06/08/2015 0728    K 3 6 05/07/2020 0348    K 3 8 06/08/2015 0728     05/07/2020 0348     06/08/2015 0728    CO2 27 05/07/2020 0348    CO2 28 7 06/08/2015 0728    BUN 15 07/15/2020 1112    BUN 23 06/08/2015 0728    CREATININE 0 85 07/15/2020 1132    CREATININE 0 96 06/08/2015 0728        Component Value Date/Time    CALCIUM 9 0 05/07/2020 0348    CALCIUM 9 2 06/08/2015 0728    ALKPHOS 117 (H) 02/24/2020 0712    ALKPHOS 117 (H) 06/08/2015 0728    AST 21 02/24/2020 0712    AST 14 06/08/2015 0728    ALT 19 02/24/2020 0712    ALT 22 06/08/2015 0728    BILITOT 0 3 06/08/2015 0728            Lab Results   Component Value Date    CHOL 166 06/08/2015    CHOL 255 12/17/2014    CHOL 274 04/14/2014     Lab Results   Component Value Date    HDL 55 02/24/2020    HDL 48 07/16/2019    HDL 63 (H) 08/13/2018     Lab Results   Component Value Date    LDLCALC 175 (H) 02/24/2020    LDLCALC 91 07/16/2019    LDLCALC 121 (H) 08/13/2018     Lab Results   Component Value Date    TRIG 120 02/24/2020    TRIG 90 07/16/2019    TRIG 116 08/13/2018     No results found for: CHOLHDL    Imaging: No results found      ECG:  Sinus rhythm with occasional premature ventricular complexes, rightward axis, incomplete right bundle branch block      Review of Systems   Cardiovascular: Negative for chest pain, claudication, cyanosis, dyspnea on exertion, irregular heartbeat, leg swelling, near-syncope, orthopnea, palpitations, paroxysmal nocturnal dyspnea and syncope  All other systems reviewed and are negative  Vitals:    09/23/20 1426   BP: 144/84   Pulse: 76   SpO2: 98%     Vitals:    09/23/20 1426   Weight: 93 9 kg (207 lb)     Height: 5' 3" (160 cm)   Body mass index is 36 67 kg/m²      Physical Exam:  General:  Alert and cooperative, appears stated age  HEENT:  PERRLA, EOMI, no scleral icterus, no conjunctival pallor  Neck:  No lymphadenopathy, no thyromegaly, no carotid bruits, no elevated JVP  Heart:  Regular rate and rhythm, normal S1/S2, 2/6 early peaking systolic ejection murmur at the right upper sternal border without radiation  Lungs:  Clear to auscultation bilaterally   Abdomen:  Soft, non-tender, positive bowel sounds, no rebound or guarding,   no organomegaly   Extremities:  No clubbing, cyanosis or edema   Vascular:  2+ pedal pulses  Skin:  No rashes or lesions on exposed skin  Neurologic:  Cranial nerves II-XII grossly intact without focal deficits

## 2020-11-05 DIAGNOSIS — I10 HYPERTENSION: ICD-10-CM

## 2020-11-05 RX ORDER — LOSARTAN POTASSIUM 50 MG/1
50 TABLET ORAL DAILY
Qty: 90 TABLET | Refills: 1 | Status: SHIPPED | OUTPATIENT
Start: 2020-11-05 | End: 2021-06-07 | Stop reason: SDUPTHER

## 2021-01-05 ENCOUNTER — IMMUNIZATIONS (OUTPATIENT)
Dept: FAMILY MEDICINE CLINIC | Facility: HOSPITAL | Age: 71
End: 2021-01-05

## 2021-01-05 DIAGNOSIS — Z23 ENCOUNTER FOR IMMUNIZATION: ICD-10-CM

## 2021-01-05 PROCEDURE — 91301 SARS-COV-2 / COVID-19 MRNA VACCINE (MODERNA) 100 MCG: CPT

## 2021-01-05 PROCEDURE — 0011A SARS-COV-2 / COVID-19 MRNA VACCINE (MODERNA) 100 MCG: CPT

## 2021-02-04 ENCOUNTER — IMMUNIZATIONS (OUTPATIENT)
Dept: FAMILY MEDICINE CLINIC | Facility: HOSPITAL | Age: 71
End: 2021-02-04

## 2021-02-04 DIAGNOSIS — Z23 ENCOUNTER FOR IMMUNIZATION: Primary | ICD-10-CM

## 2021-02-04 PROCEDURE — 0012A SARS-COV-2 / COVID-19 MRNA VACCINE (MODERNA) 100 MCG: CPT

## 2021-02-04 PROCEDURE — 91301 SARS-COV-2 / COVID-19 MRNA VACCINE (MODERNA) 100 MCG: CPT

## 2021-02-17 ENCOUNTER — OFFICE VISIT (OUTPATIENT)
Dept: INTERNAL MEDICINE CLINIC | Facility: CLINIC | Age: 71
End: 2021-02-17
Payer: MEDICARE

## 2021-02-17 VITALS
DIASTOLIC BLOOD PRESSURE: 82 MMHG | SYSTOLIC BLOOD PRESSURE: 128 MMHG | TEMPERATURE: 96.6 F | HEART RATE: 74 BPM | BODY MASS INDEX: 36.59 KG/M2 | OXYGEN SATURATION: 99 % | HEIGHT: 63 IN | WEIGHT: 206.5 LBS

## 2021-02-17 DIAGNOSIS — Z00.00 MEDICARE ANNUAL WELLNESS VISIT, SUBSEQUENT: ICD-10-CM

## 2021-02-17 DIAGNOSIS — Z78.0 POSTMENOPAUSAL: ICD-10-CM

## 2021-02-17 DIAGNOSIS — E11.9 TYPE 2 DIABETES MELLITUS WITHOUT COMPLICATION, WITHOUT LONG-TERM CURRENT USE OF INSULIN (HCC): Primary | ICD-10-CM

## 2021-02-17 DIAGNOSIS — I10 ACCELERATED ESSENTIAL HYPERTENSION: ICD-10-CM

## 2021-02-17 DIAGNOSIS — Z12.11 SCREENING FOR COLON CANCER: ICD-10-CM

## 2021-02-17 DIAGNOSIS — Z12.31 VISIT FOR SCREENING MAMMOGRAM: ICD-10-CM

## 2021-02-17 DIAGNOSIS — E55.9 VITAMIN D DEFICIENCY: ICD-10-CM

## 2021-02-17 DIAGNOSIS — E66.01 CLASS 2 SEVERE OBESITY DUE TO EXCESS CALORIES WITH SERIOUS COMORBIDITY AND BODY MASS INDEX (BMI) OF 36.0 TO 36.9 IN ADULT (HCC): ICD-10-CM

## 2021-02-17 DIAGNOSIS — E78.5 DYSLIPIDEMIA: ICD-10-CM

## 2021-02-17 PROCEDURE — G0438 PPPS, INITIAL VISIT: HCPCS | Performed by: FAMILY MEDICINE

## 2021-02-17 PROCEDURE — 1123F ACP DISCUSS/DSCN MKR DOCD: CPT | Performed by: FAMILY MEDICINE

## 2021-02-17 PROCEDURE — 99214 OFFICE O/P EST MOD 30 MIN: CPT | Performed by: FAMILY MEDICINE

## 2021-02-17 NOTE — PROGRESS NOTES
Assessment/Plan:    No problem-specific Assessment & Plan notes found for this encounter  Diagnoses and all orders for this visit:    Type 2 diabetes mellitus without complication, without long-term current use of insulin ([facility] [address] )  -     Ambulatory referral to Ophthalmology; Future  -     Comprehensive metabolic panel; Future  -     Hemoglobin A1C; Future  -     Microalbumin / creatinine urine ratio    Accelerated essential hypertension  -     CBC and differential; Future  -     Comprehensive metabolic panel; Future    Dyslipidemia  -     Comprehensive metabolic panel; Future  -     Lipid panel; Future  -     TSH, 3rd generation; Future    Vitamin D deficiency  -     Vitamin D 25 hydroxy; Future    Class 2 severe obesity due to excess calories with serious comorbidity and body mass index (BMI) of 36 0 to 36 9 in [facility])    Postmenopausal  -     DXA bone density spine hip and pelvis; Future    Visit for screening mammogram  -     Mammo screening bilateral w 3d & cad; Future    Screening for colon cancer  -     Cologuard; Future          Orders and recommendations as noted above  Slip given for laboratory testing  Discussed with her that has been close to a year since she has had any laboratory testing done which is understandable given the recent stressors  Stressed her the importance of having this done in the near future and we will contact her with the results  Slip given for mammogram and bone density  She wishes to avoid colonoscopy at present and agrees to Cologuard  Continue follow-up with Podiatry on a regular basis  Follow-up with Ophthalmology regularly  She usually goes every 1-2 years  She will be following up again with Ophthalmology in April  Watch carbohydrate intake  Try to remain as active as possible  Slow but steady weight loss recommended  Check feet daily  Continue with the losartan as previously    This is 1st for blood pressure control but then also for renal protection with her being diabetic  Blood pressure currently well controlled  Watch salt intake  Stay adequately hydrated  Recent stressors discussed with her  She has lost her  and her son now has of glioblastoma  She has been helping as much as possible  Methods for stress relief discussed  Cholesterol has been elevated in the past but has not tolerated statins well  Will recheck with upcoming laboratory testing  Continue with vitamin-D supplementation  Adequate sleep discussed  Follow-up with Cardiology on a yearly basis as per routine  Slip given for mammogram and bone density  Declines colonoscopy at present but is agreeable to Cologuadale  Has had Cologuard  He up-to-date on other vaccines  Will have her follow up in about 3-6 months depending on laboratory testing  Follow up sooner if needed  Subjective:      Patient ID: Angeles Lanza is a 65 y o  female  She presents for routine follow-up as well as Medicare wellness visit  Has had a lot of stressors over the last year  Her  has had a gradual decline in health and ended up in a nursing home after hospitalization  He did succumb to medical issues along with the coronavirus  Her son continues to lizama glioblastoma  She has been as supportive to him as possible and has been spending time with him as well as with her other children and grandchildren as much as possible  Has felt stressed somewhat  Has difficulty sleeping at times  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  She continues to be as helpful as possible to her children  She has put off most of the routine testing and labs over the last year because of the recent stressors  Tolerating her losartan without difficulty  Denies any headaches or localized weakness  Denies any chest pain or palpitations  Denies any significant shortness of breath  Has not been taking cholesterol medication regularly  Denies any significant shortness of breath    Denies any significant peripheral edema  Appetite has been stable  Denies any nausea, vomiting, or diarrhea  Denies any changes in bowel movements  The following portions of the patient's history were reviewed and updated as appropriate:   She  has a past medical history of Arthritis, Carpal tunnel syndrome, Cataract, Cerebral aneurysm, Decreased body height, Heart murmur, Hypercholesteremia, Hypertension, Motion sickness, Obesity, Periodic heart flutter, Pneumonia, PONV (postoperative nausea and vomiting), Rosacea, Vertigo, Wears glasses, and Wears partial dentures  She   Patient Active Problem List    Diagnosis Date Noted    Premature ventricular contractions (PVCs) (VPCs) 2020    Right flank pain 2020    Chronic left flank pain 2020    Sciatica of right side 2019    Elevated TSH 2019    Anxiety 2019    Abnormal mammogram 2018    Hyperhidrosis 2018    Grieving 2018    Abnormal electromyography 2018    Closed fracture of multiple ribs of left side with routine healing 2018    Fall 2018    Cerebral aneurysm 2018    Aortic stenosis, mild 2018    Complex endometrial hyperplasia 2017    Thickened endometrium 2017    Female pelvic pain 2017    Primary osteoarthritis of right knee 2016    Carpal tunnel syndrome 2016    Tendonitis of left hip 2015    Vitamin D deficiency 2015    Type 2 diabetes mellitus without complication, without long-term current use of insulin ([facility] [address] ) 2015    Dyslipidemia 2014    Osteopenia 2014    Accelerated essential hypertension 2013    Class 2 severe obesity due to excess calories with serious comorbidity and body mass index (BMI) of 36 0 to 36 9 in adult [facility]) 2013    Fatigue 2012    Asymptomatic varicose veins 2012     She  has a past surgical history that includes Colonoscopy;   section; pr revise median n/carpal tunnel surg (Left, 4/8/2016); Carpal tunnel release (Bilateral); pr laparoscopy w tot hysterectuterus <=250 gram  w tube/ovary (N/A, 8/28/2017); IR cerebral angiography (10/12/2018); and IR biopsy lymph node (12/17/2018)  Her family history includes Arthritis in her mother; Colon cancer (age of onset: 78) in her sister; Diabetes in her father; Hodgkin's lymphoma (age of onset: 24) in her child; Hyperlipidemia in her sister; Hypertension in her mother; Stroke in her mother; Sudden death in her brother and father  She  reports that she has never smoked  She has never used smokeless tobacco  She reports that she does not drink alcohol or use drugs  Current Outpatient Medications   Medication Sig Dispense Refill    Coenzyme Q10 (COQ-10) 100 MG CAPS Take by mouth daily      losartan (COZAAR) 50 mg tablet Take 1 tablet (50 mg total) by mouth daily 90 tablet 1    metroNIDAZOLE (METROGEL) 0 75 % gel        No current facility-administered medications for this visit  Current Outpatient Medications on File Prior to Visit   Medication Sig    Coenzyme Q10 (COQ-10) 100 MG CAPS Take by mouth daily    losartan (COZAAR) 50 mg tablet Take 1 tablet (50 mg total) by mouth daily    metroNIDAZOLE (METROGEL) 0 75 % gel     [DISCONTINUED] ezetimibe (ZETIA) 10 mg tablet Take 1 tablet (10 mg total) by mouth daily (Patient not taking: Reported on 2/17/2021)    [DISCONTINUED] furosemide (LASIX) 20 mg tablet Take 1 tablet (20 mg total) by mouth as needed (swelling) (Patient not taking: Reported on 2/17/2021)     No current facility-administered medications on file prior to visit  She is allergic to penicillins       Review of Systems   Constitutional: Positive for fatigue  Negative for activity change, appetite change, chills and fever  HENT: Negative for congestion and rhinorrhea  Eyes: Negative for visual disturbance  Respiratory: Negative for cough, chest tightness and shortness of breath  Cardiovascular: Negative for chest pain and palpitations  Gastrointestinal: Negative for abdominal pain, blood in stool, diarrhea, nausea and vomiting  Endocrine: Negative for polydipsia, polyphagia and polyuria  Genitourinary: Negative for dysuria, frequency and urgency  Musculoskeletal: Positive for arthralgias  Negative for gait problem  Skin: Negative for color change  Neurological: Negative for dizziness and headaches  Hematological: Does not bruise/bleed easily  Psychiatric/Behavioral: Positive for sleep disturbance  Negative for confusion and decreased concentration  The patient is not nervous/anxious  Objective:      /82 (BP Location: Left arm, Patient Position: Sitting, Cuff Size: Large)   Pulse 74   Temp (!) 96 6 °F (35 9 °C)   Ht 5' 3" (1 6 m)   Wt 93 7 kg (206 lb 8 oz)   LMP  (LMP Unknown)   SpO2 99%   BMI 36 58 kg/m²          Physical Exam  Vitals signs and nursing note reviewed  Constitutional:       General: She is not in acute distress  Appearance: She is well-developed, well-groomed and overweight  HENT:      Head: Normocephalic and atraumatic  Eyes:      General:         Right eye: No discharge  Left eye: No discharge  Conjunctiva/sclera: Conjunctivae normal       Pupils: Pupils are equal, round, and reactive to light  Cardiovascular:      Rate and Rhythm: Normal rate and regular rhythm  Heart sounds: Normal heart sounds  No murmur  No friction rub  No gallop  Pulmonary:      Effort: No respiratory distress  Breath sounds: No wheezing or rales  Abdominal:      General: Bowel sounds are normal  There is no distension  Tenderness: There is no abdominal tenderness  Lymphadenopathy:      Cervical: No cervical adenopathy  Skin:     General: Skin is warm and dry  Neurological:      Mental Status: She is alert and oriented to person, place, and time     Psychiatric:         Mood and Affect: Mood and affect normal  Speech: Speech normal          Behavior: Behavior normal  Behavior is cooperative  Cognition and Memory: Cognition and memory normal          Below is the patient's most recent value for Albumin, ALT, AST, BUN, Calcium, Chloride, Cholesterol, CO2, Creatinine, GFR, Glucose, HDL, Hematocrit, Hemoglobin, Hemoglobin A1C, LDL, Magnesium, Phosphorus, Platelets, Potassium, PSA, Sodium, Triglycerides, and WBC  Lab Results   Component Value Date    ALT 19 02/24/2020    AST 21 02/24/2020    BUN 15 07/15/2020    CALCIUM 9 0 05/07/2020     05/07/2020    CHOL 166 06/08/2015    CO2 27 05/07/2020    CREATININE 0 85 07/15/2020    HDL 55 02/24/2020    HCT 38 6 05/07/2020    HGB 12 5 05/07/2020    HGBA1C 6 0 (H) 02/24/2020    MG 2 0 05/07/2020    PHOS 2 9 07/04/2018     05/07/2020    K 3 6 05/07/2020     06/08/2015    TRIG 120 02/24/2020    WBC 9 50 05/07/2020     Note: for a comprehensive list of the patient's lab results, access the Results Review activity

## 2021-02-17 NOTE — PROGRESS NOTES
Assessment and Plan:     Problem List Items Addressed This Visit        Endocrine    Type 2 diabetes mellitus without complication, without long-term current use of insulin ([facility] [address] ) - Primary    Relevant Orders    Ambulatory referral to Ophthalmology    Comprehensive metabolic panel    Hemoglobin A1C    Microalbumin / creatinine urine ratio       Cardiovascular and Mediastinum    Accelerated essential hypertension    Relevant Orders    CBC and differential    Comprehensive metabolic panel       Other    Dyslipidemia    Relevant Orders    Comprehensive metabolic panel    Lipid panel    TSH, 3rd generation    Class 2 severe obesity due to excess calories with serious comorbidity and body mass index (BMI) of 36 0 to 36 9 in adult [facility])    Vitamin D deficiency    Relevant Orders    Vitamin D 25 hydroxy      Other Visit Diagnoses     Postmenopausal        Relevant Orders    DXA bone density spine hip and pelvis    Visit for screening mammogram        Relevant Orders    Mammo screening bilateral w 3d & cad    Screening for colon cancer        Relevant Orders    Cologuard    Medicare annual wellness visit, subsequent            BMI Counseling: Body mass index is 36 58 kg/m²  The BMI is above normal  Nutrition recommendations include decreasing portion sizes, encouraging healthy choices of fruits and vegetables, consuming healthier snacks, limiting drinks that contain sugar, moderation in carbohydrate intake and reducing intake of cholesterol  Exercise recommendations include exercising 3-5 times per week  Preventive health issues were discussed with patient, and age appropriate screening tests were ordered as noted in patient's After Visit Summary  Personalized health advice and appropriate referrals for health education or preventive services given if needed, as noted in patient's After Visit Summary       History of Present Illness:     Patient presents for Medicare Annual Wellness visit    Patient Care Team:  Isaak Houston MD as PCP - General  MD Filemon Webb MD Hiram Dyke, DO Dorcus Railing, MD Georg Rhyme, MD     Problem List:     Patient Active Problem List   Diagnosis    Carpal tunnel syndrome    Accelerated essential hypertension    Dyslipidemia    Class 2 severe obesity due to excess calories with serious comorbidity and body mass index (BMI) of 36 0 to 36 9 in adult [facility])    Aortic stenosis, mild    Complex endometrial hyperplasia    Fatigue    Female pelvic pain    Type 2 diabetes mellitus without complication, without long-term current use of insulin ([address] )    Osteopenia    Primary osteoarthritis of right knee    Tendonitis of left hip    Thickened endometrium    Asymptomatic varicose veins    Vitamin D deficiency    Closed fracture of multiple ribs of left side with routine healing    Fall    Cerebral aneurysm    Abnormal electromyography    Hyperhidrosis    Grieving    Abnormal mammogram    Anxiety    Elevated TSH    Sciatica of right side    Right flank pain    Chronic left flank pain    Premature ventricular contractions (PVCs) (VPCs)      Past Medical and Surgical History:     Past Medical History:   Diagnosis Date    Arthritis     Carpal tunnel syndrome     unspecified laterality    Cataract     Cerebral aneurysm     Decreased body height     last assessed 3/17/14    Heart murmur     Hypercholesteremia     Hypertension     Motion sickness     Obesity     Periodic heart flutter     Pneumonia     x1    PONV (postoperative nausea and vomiting)     Rosacea     Vertigo     Wears glasses     Wears partial dentures     upper     Past Surgical History:   Procedure Laterality Date    CARPAL TUNNEL RELEASE Bilateral      SECTION      x1    COLONOSCOPY      IR BIOPSY LYMPH NODE  2018    IR CEREBRAL ANGIOGRAPHY  10/12/2018    CO LAPAROSCOPY W TOT HYSTERECTUTERUS <=250 GRAM  W TUBE/OVARY N/A 2017 Procedure: HYSTERECTOMY LAPAROSCOPIC TOTAL, BSO, Cystoscopy;  Surgeon: Esteban Akhtar MD;  Location: [facility];  Service: Gynecology    CO REVISE MEDIAN N/CARPAL TUNNEL SURG Left 4/8/2016    Procedure: RELEASE CARPAL TUNNEL;  Surgeon: Quiana Jackson MD;  Location: [facility];  Service: Orthopedics      Family History:     Family History   Problem Relation Age of Onset    Arthritis Mother     Hypertension Mother     Stroke Mother         syndrome    Diabetes Father     Sudden death Father         instantaneous cardiac    Hyperlipidemia Sister     Colon cancer Sister 78    Sudden death Brother         instananeous cardiac    Hodgkin's lymphoma Child 21      Social History:     E-Cigarette/Vaping    E-Cigarette Use Never User      E-Cigarette/Vaping Substances    Nicotine No     THC No     CBD No     Flavoring No     Other No     Unknown No      Social History     Socioeconomic History    Marital status: /Civil Union     Spouse name: None    Number of children: None    Years of education: None    Highest education level: None   Occupational History    None   Social Needs    Financial resource strain: None    Food insecurity     Worry: None     Inability: None    Transportation needs     Medical: None     Non-medical: None   Tobacco Use    Smoking status: Never Smoker    Smokeless tobacco: Never Used   Substance and Sexual Activity    Alcohol use: No    Drug use: No    Sexual activity: None   Lifestyle    Physical activity     Days per week: None     Minutes per session: None    Stress: None   Relationships    Social connections     Talks on phone: None     Gets together: None     Attends Jewish service: None     Active member of club or organization: None     Attends meetings of clubs or organizations: None     Relationship status: None    Intimate partner violence     Fear of current or ex partner: None     Emotionally abused: None     Physically abused: None     Forced sexual activity: None   Other Topics Concern    None   Social History Narrative    Stress at work      Medications and Allergies:     Current Outpatient Medications   Medication Sig Dispense Refill    Coenzyme Q10 (COQ-10) 100 MG CAPS Take by mouth daily      losartan (COZAAR) 50 mg tablet Take 1 tablet (50 mg total) by mouth daily 90 tablet 1    metroNIDAZOLE (METROGEL) 0 75 % gel        No current facility-administered medications for this visit  Allergies   Allergen Reactions    Penicillins Hives and Rash      Immunizations:     Immunization History   Administered Date(s) Administered    INFLUENZA 10/12/2016, 11/02/2018, 11/01/2019    Influenza Split High Dose Preservative Free IM 10/12/2016    Influenza, high dose seasonal 0 7 mL 11/02/2018, 09/16/2020    Influenza, seasonal, injectable 09/12/2011, 09/01/2013    Pneumococcal Conjugate 13-Valent 06/20/2016    Pneumococcal Polysaccharide PPV23 10/19/2007, 11/06/2018    SARS-CoV-2 / COVID-19 mRNA IM (Moderna) 01/05/2021, 02/04/2021    Zoster 11/04/2015      Health Maintenance:         Topic Date Due    DXA SCAN  08/08/2019    MAMMOGRAM  11/15/2020    Colorectal Cancer Screening  12/08/2024    Hepatitis C Screening  Completed         Topic Date Due    DTaP,Tdap,and Td Vaccines (1 - Tdap) 03/16/1969      Medicare Health Risk Assessment:     /82 (BP Location: Left arm, Patient Position: Sitting, Cuff Size: Large)   Pulse 74   Temp (!) 96 6 °F (35 9 °C)   Ht 5' 3" (1 6 m)   Wt 93 7 kg (206 lb 8 oz)   LMP  (LMP Unknown)   SpO2 99%   BMI 36 58 kg/m²      Adithya Vallejo is here for her Subsequent Wellness visit  Last Medicare Wellness visit information reviewed, patient interviewed and updates made to the record today  Health Risk Assessment:   Patient rates overall health as very good  Patient feels that their physical health rating is slightly better  Eyesight was rated as same  Hearing was rated as same   Patient feels that their emotional and mental health rating is slightly better  Pain experienced in the last 7 days has been none  Patient states that she has experienced no weight loss or gain in last 6 months  Depression Screening:   PHQ-2 Score: 0      Fall Risk Screening: In the past year, patient has experienced: no history of falling in past year      Urinary Incontinence Screening:   Patient has not leaked urine accidently in the last six months  Home Safety:  Patient does not have trouble with stairs inside or outside of their home  Patient has working smoke alarms and has working carbon monoxide detector  Home safety hazards include: none  Nutrition:   Current diet is Regular  Does try to watch what she eats    Medications:   Patient is currently taking over-the-counter supplements  OTC medications include: see medication list  Patient is able to manage medications  Activities of Daily Living (ADLs)/Instrumental Activities of Daily Living (IADLs):   Walk and transfer into and out of bed and chair?: Yes  Dress and groom yourself?: Yes    Bathe or shower yourself?: Yes    Feed yourself? Yes  Do your laundry/housekeeping?: Yes  Manage your money, pay your bills and track your expenses?: Yes  Make your own meals?: Yes    Do your own shopping?: Yes    Previous Hospitalizations:   Any hospitalizations or ED visits within the last 12 months?: No      Advance Care Planning:   Living will: Yes    Durable POA for healthcare:  Yes    Advanced directive: Yes    Provider agrees with end of life decisions: Yes      Cognitive Screening:   Provider or family/friend/caregiver concerned regarding cognition?: No    PREVENTIVE SCREENINGS      Cardiovascular Screening:    General: Screening Current      Diabetes Screening:     General: Screening Not Indicated and History Diabetes      Colorectal Cancer Screening:     General: Screening Current    Due for: Cologuard      Breast Cancer Screening:     General: Risks and Benefits Discussed    Due for: Mammogram        Cervical Cancer Screening:    General: Screening Not Indicated      Osteoporosis Screening:    General: Risks and Benefits Discussed      Abdominal Aortic Aneurysm (AAA) Screening:        General: Screening Not Indicated      Lung Cancer Screening:     General: Screening Not Indicated      Hepatitis C Screening:    General: Screening Current      Elias Sandra MD

## 2021-02-17 NOTE — PATIENT INSTRUCTIONS
Medicare Preventive Visit Patient Instructions  Thank you for completing your Welcome to Medicare Visit or Medicare Annual Wellness Visit today  Your next wellness visit will be due in one year (2/17/2022)  The screening/preventive services that you may require over the next 5-10 years are detailed below  Some tests may not apply to you based off risk factors and/or age  Screening tests ordered at today's visit but not completed yet may show as past due  Also, please note that scanned in results may not display below  Preventive Screenings:  Service Recommendations Previous Testing/Comments   Colorectal Cancer Screening  * Colonoscopy    * Fecal Occult Blood Test (FOBT)/Fecal Immunochemical Test (FIT)  * Fecal DNA/Cologuard Test  * Flexible Sigmoidoscopy Age: 54-65 years old   Colonoscopy: every 10 years (may be performed more frequently if at higher risk)  OR  FOBT/FIT: every 1 year  OR  Cologuard: every 3 years  OR  Sigmoidoscopy: every 5 years  Screening may be recommended earlier than age 48 if at higher risk for colorectal cancer  Also, an individualized decision between you and your healthcare provider will decide whether screening between the ages of 74-80 would be appropriate  Colonoscopy: 12/08/2014  FOBT/FIT: Not on file  Cologuard: Not on file  Sigmoidoscopy: Not on file    Screening Current     Breast Cancer Screening Age: 36 years old  Frequency: every 1-2 years  Not required if history of left and right mastectomy Mammogram: 11/15/2018       Cervical Cancer Screening Between the ages of 21-29, pap smear recommended once every 3 years  Between the ages of 33-67, can perform pap smear with HPV co-testing every 5 years     Recommendations may differ for women with a history of total hysterectomy, cervical cancer, or abnormal pap smears in past  Pap Smear: Not on file    Screening Not Indicated   Hepatitis C Screening Once for adults born between 1945 and 1965  More frequently in patients at high risk for Hepatitis C Hep C Antibody: 12/10/2018    Screening Current   Diabetes Screening 1-2 times per year if you're at risk for diabetes or have pre-diabetes Fasting glucose: 102 mg/dL   A1C: 6 0 %    Screening Not Indicated  History Diabetes   Cholesterol Screening Once every 5 years if you don't have a lipid disorder  May order more often based on risk factors  Lipid panel: 02/24/2020    Screening Current     Other Preventive Screenings Covered by Medicare:  1  Abdominal Aortic Aneurysm (AAA) Screening: covered once if your at risk  You're considered to be at risk if you have a family history of AAA  2  Lung Cancer Screening: covers low dose CT scan once per year if you meet all of the following conditions: (1) Age 50-69; (2) No signs or symptoms of lung cancer; (3) Current smoker or have quit smoking within the last 15 years; (4) You have a tobacco smoking history of at least 30 pack years (packs per day multiplied by number of years you smoked); (5) You get a written order from a healthcare provider  3  Glaucoma Screening: covered annually if you're considered high risk: (1) You have diabetes OR (2) Family history of glaucoma OR (3)  aged 48 and older OR (3)  American aged 72 and older  3  Osteoporosis Screening: covered every 2 years if you meet one of the following conditions: (1) You're estrogen deficient and at risk for osteoporosis based off medical history and other findings; (2) Have a vertebral abnormality; (3) On glucocorticoid therapy for more than 3 months; (4) Have primary hyperparathyroidism; (5) On osteoporosis medications and need to assess response to drug therapy  · Last bone density test (DXA Scan): 08/08/2017  5  HIV Screening: covered annually if you're between the age of 12-76  Also covered annually if you are younger than 13 and older than 72 with risk factors for HIV infection   For pregnant patients, it is covered up to 3 times per pregnancy  Immunizations:  Immunization Recommendations   Influenza Vaccine Annual influenza vaccination during flu season is recommended for all persons aged >= 6 months who do not have contraindications   Pneumococcal Vaccine (Prevnar and Pneumovax)  * Prevnar = PCV13  * Pneumovax = PPSV23   Adults 25-60 years old: 1-3 doses may be recommended based on certain risk factors  Adults 72 years old: Prevnar (PCV13) vaccine recommended followed by Pneumovax (PPSV23) vaccine  If already received PPSV23 since turning 65, then PCV13 recommended at least one year after PPSV23 dose  Hepatitis B Vaccine 3 dose series if at intermediate or high risk (ex: diabetes, end stage renal disease, liver disease)   Tetanus (Td) Vaccine - COST NOT COVERED BY MEDICARE PART B Following completion of primary series, a booster dose should be given every 10 years to maintain immunity against tetanus  Td may also be given as tetanus wound prophylaxis  Tdap Vaccine - COST NOT COVERED BY MEDICARE PART B Recommended at least once for all adults  For pregnant patients, recommended with each pregnancy  Shingles Vaccine (Shingrix) - COST NOT COVERED BY MEDICARE PART B  2 shot series recommended in those aged 48 and above     Health Maintenance Due:      Topic Date Due    DXA SCAN  08/08/2019    MAMMOGRAM  11/15/2020    Colorectal Cancer Screening  12/08/2024    Hepatitis C Screening  Completed     Immunizations Due:      Topic Date Due    DTaP,Tdap,and Td Vaccines (1 - Tdap) 03/16/1969     Advance Directives   What are advance directives? Advance directives are legal documents that state your wishes and plans for medical care  These plans are made ahead of time in case you lose your ability to make decisions for yourself  Advance directives can apply to any medical decision, such as the treatments you want, and if you want to donate organs  What are the types of advance directives?   There are many types of advance directives, and each state has rules about how to use them  You may choose a combination of any of the following:  · Living will: This is a written record of the treatment you want  You can also choose which treatments you do not want, which to limit, and which to stop at a certain time  This includes surgery, medicine, IV fluid, and tube feedings  · Durable power of  for healthcare [place] [facility]): This is a written record that states who you want to make healthcare choices for you when you are unable to make them for yourself  This person, called a proxy, is usually a family member or a friend  You may choose more than 1 proxy  · Do not resuscitate (DNR) order:  A DNR order is used in case your heart stops beating or you stop breathing  It is a request not to have certain forms of treatment, such as CPR  A DNR order may be included in other types of advance directives  · Medical directive: This covers the care that you want if you are in a coma, near death, or unable to make decisions for yourself  You can list the treatments you want for each condition  Treatment may include pain medicine, surgery, blood transfusions, dialysis, IV or tube feedings, and a ventilator (breathing machine)  · Values history: This document has questions about your views, beliefs, and how you feel and think about life  This information can help others choose the care that you would choose  Why are advance directives important? An advance directive helps you control your care  Although spoken wishes may be used, it is better to have your wishes written down  Spoken wishes can be misunderstood, or not followed  Treatments may be given even if you do not want them  An advance directive may make it easier for your family to make difficult choices about your care     Weight Management   Why it is important to manage your weight:  Being overweight increases your risk of health conditions such as heart disease, high blood pressure, type 2 diabetes, and certain types of cancer  It can also increase your risk for osteoarthritis, sleep apnea, and other respiratory problems  Aim for a slow, steady weight loss  Even a small amount of weight loss can lower your risk of health problems  How to lose weight safely:  A safe and healthy way to lose weight is to eat fewer calories and get regular exercise  You can lose up about 1 pound a week by decreasing the number of calories you eat by 500 calories each day  Healthy meal plan for weight management:  A healthy meal plan includes a variety of foods, contains fewer calories, and helps you stay healthy  A healthy meal plan includes the following:  · Eat whole-grain foods more often  A healthy meal plan should contain fiber  Fiber is the part of grains, fruits, and vegetables that is not broken down by your body  Whole-grain foods are healthy and provide extra fiber in your diet  Some examples of whole-grain foods are whole-wheat breads and pastas, oatmeal, brown rice, and bulgur  · Eat a variety of vegetables every day  Include dark, leafy greens such as spinach, kale, shaun greens, and mustard greens  Eat yellow and orange vegetables such as carrots, sweet potatoes, and winter squash  · Eat a variety of fruits every day  Choose fresh or canned fruit (canned in its own juice or light syrup) instead of juice  Fruit juice has very little or no fiber  · Eat low-fat dairy foods  Drink fat-free (skim) milk or 1% milk  Eat fat-free yogurt and low-fat cottage cheese  Try low-fat cheeses such as mozzarella and other reduced-fat cheeses  · Choose meat and other protein foods that are low in fat  Choose beans or other legumes such as split peas or lentils  Choose fish, skinless poultry (chicken or turkey), or lean cuts of red meat (beef or pork)  Before you cook meat or poultry, cut off any visible fat  · Use less fat and oil  Try baking foods instead of frying them   Add less fat, such as margarine, sour cream, regular salad dressing and mayonnaise to foods  Eat fewer high-fat foods  Some examples of high-fat foods include french fries, doughnuts, ice cream, and cakes  · Eat fewer sweets  Limit foods and drinks that are high in sugar  This includes candy, cookies, regular soda, and sweetened drinks  Exercise:  Exercise at least 30 minutes per day on most days of the week  Some examples of exercise include walking, biking, dancing, and swimming  You can also fit in more physical activity by taking the stairs instead of the elevator or parking farther away from stores  Ask your healthcare provider about the best exercise plan for you  © Copyright [employer] 2018 Information is for End User's use only and may not be sold, redistributed or otherwise used for commercial purposes   All illustrations and images included in CareNotes® are the copyrighted property of [employer]  or [address] [place] [address]

## 2021-03-29 ENCOUNTER — APPOINTMENT (EMERGENCY)
Dept: CT IMAGING | Facility: HOSPITAL | Age: 71
End: 2021-03-29
Payer: MEDICARE

## 2021-03-29 ENCOUNTER — HOSPITAL ENCOUNTER (EMERGENCY)
Facility: HOSPITAL | Age: 71
Discharge: HOME | End: 2021-03-29
Attending: FAMILY MEDICINE | Admitting: FAMILY MEDICINE
Payer: MEDICARE

## 2021-03-29 VITALS
OXYGEN SATURATION: 94 % | TEMPERATURE: 99.1 F | DIASTOLIC BLOOD PRESSURE: 74 MMHG | WEIGHT: 210.76 LBS | RESPIRATION RATE: 17 BRPM | BODY MASS INDEX: 37.33 KG/M2 | HEART RATE: 74 BPM | SYSTOLIC BLOOD PRESSURE: 146 MMHG

## 2021-03-29 DIAGNOSIS — S00.01XA SCALP ABRASION, NON-INFECTED: ICD-10-CM
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
## 2021-03-29 PROCEDURE — G1004 CDSM NDSC: HCPCS

## 2021-03-29 PROCEDURE — 99284 EMERGENCY DEPT VISIT MOD MDM: CPT

## 2021-03-29 PROCEDURE — 99284 EMERGENCY DEPT VISIT MOD MDM: CPT | Performed by: FAMILY MEDICINE

## 2021-03-29 PROCEDURE — 70450 CT HEAD/BRAIN W/O DYE: CPT

## 2021-03-29 RX ORDER — DOXYCYCLINE HYCLATE 100 MG/1
100 CAPSULE ORAL EVERY 12 HOURS SCHEDULED
COMMUNITY
End: 2021-12-20

## 2021-03-29 RX ORDER — GINSENG 100 MG
1 CAPSULE ORAL ONCE
Status: COMPLETED | OUTPATIENT
Start: 2021-03-29 | End: 2021-03-29

## 2021-03-29 RX ADMIN — BACITRACIN 1 SMALL APPLICATION: 500 OINTMENT TOPICAL at 14:52

## 2021-03-29 NOTE — ED PROVIDER NOTES
Emergency Department Trauma Note  Jesse Felton 66 y o  female MRN: [id number]  Unit/Bed#: DG64/DW19 Encounter: [id number]      Trauma Alert: Trauma Acuity: Trauma Evaluation  Model of Arrival:   via    Trauma Team: Current Providers  Attending Provider: Yair Cortez MD  Registered Nurse: Kayleigh Stern RN  Consultants: None      History of Present Illness     Chief Complaint:   Chief Complaint   Patient presents with    Head Injury     pt missed last 3 steps falling hitting head off base board lacerating head  no blood thinners  no loc noted     HPI:  Jesse Felton is a 66 y o  female who presents with fall  Mechanism:Details of Incident: pt missed last 3 steps falling hitting head on base board  no loc noted  no blood thinners  Injury Date: 03/29/21 Injury Time: 1245 Injury [facility]urence [facility] [address]    HPI   This is a 65 yo f presented to ED with c/o fall  Pt states she was coming down the stairs when missed the last two steps and fell  She states she hit her head on base board heater  Pt doesn't take any blood thinner  Denies any headache or blurry vision  Pt is AAox3  OFL16  No laceration noted  Denies any chest pain or sob  Denies any abdominal pain or n/v/d  Trauma evaluation was called by nursing staff due to her hx of brain aneurysm  Patient is denying any headache blurry vision double vision at this time  She denies any neck pain  Review of Systems   Constitutional: Negative  HENT: Negative  Eyes: Negative  Respiratory: Negative  Cardiovascular: Negative  Gastrointestinal: Negative  Endocrine: Negative  Genitourinary: Negative  Musculoskeletal: Negative  Skin:        Scalp abrasion   Allergic/Immunologic: Negative  Neurological: Negative  Psychiatric/Behavioral: Negative          Historical Information     Immunizations:   Immunization History   Administered Date(s) Administered    INFLUENZA 10/12/2016, 11/02/2018, 11/01/2019    Influenza Split High Dose Preservative Free IM 10/12/2016    Influenza, high dose seasonal 0 7 mL 2018, 2020    Influenza, seasonal, injectable 2011, 2013    Pneumococcal Conjugate 13-Valent 2016    Pneumococcal Polysaccharide PPV23 10/19/2007, 2018    SARS-CoV-2 / COVID-19 mRNA IM (Moderna) 2021, 2021    Zoster 2015       Past Medical History:   Diagnosis Date    Arthritis     Carpal tunnel syndrome     unspecified laterality    Cataract     Cerebral aneurysm     Decreased body height     last assessed 3/17/14    Heart murmur     Hypercholesteremia     Hypertension     Motion sickness     Obesity     Periodic heart flutter     Pneumonia     x1    PONV (postoperative nausea and vomiting)     Rosacea     Vertigo     Wears glasses     Wears partial dentures     upper       Family History   Problem Relation Age of Onset    Arthritis Mother     Hypertension Mother     Stroke Mother         syndrome    Diabetes Father     Sudden death Father         instantaneous cardiac    Hyperlipidemia Sister     Colon cancer Sister 78    Sudden death Brother         instananeous cardiac    Hodgkin's lymphoma Child 21     Past Surgical History:   Procedure Laterality Date    CARPAL TUNNEL RELEASE Bilateral      SECTION      x1    COLONOSCOPY      IR BIOPSY LYMPH NODE  2018    IR CEREBRAL ANGIOGRAPHY  10/12/2018    AZ LAPAROSCOPY W TOT HYSTERECTUTERUS <=250 GRAM  W TUBE/OVARY N/A 2017    Procedure: HYSTERECTOMY LAPAROSCOPIC TOTAL, BSO, Cystoscopy;  Surgeon: Kiera Hamm MD;  [facility];  Service: Gynecology    AZ REVISE MEDIAN N/CARPAL TUNNEL SURG Left 2016    Procedure: RELEASE CARPAL TUNNEL;  Surgeon: Connor Lucero MD;  [facility];  Service: Orthopedics     Social History     Tobacco Use    Smoking status: Never Smoker    Smokeless tobacco: Never Used   Substance Use Topics    Alcohol use: No    Drug use: No     E-Cigarette/Vaping    E-Cigarette Use Never User      E-Cigarette/Vaping Substances    Nicotine No     THC No     CBD No     Flavoring No     Other No     Unknown No        Family History: non-contributory    Meds/Allergies   Prior to Admission Medications   Prescriptions Last Dose Informant Patient Reported? Taking? ATORVASTATIN CALCIUM PO   Yes Yes   Sig: Take 1 tablet by mouth 3 (three) times a week   Coenzyme Q10 (COQ-10) 100 MG CAPS  Self Yes Yes   Sig: Take by mouth daily   doxycycline hyclate (VIBRAMYCIN) 100 mg capsule   Yes Yes   Sig: Take 100 mg by mouth every 12 (twelve) hours   losartan (COZAAR) 50 mg tablet   No Yes   Sig: Take 1 tablet (50 mg total) by mouth daily   metroNIDAZOLE (METROGEL) 0 75 % gel  Self Yes Yes      Facility-Administered Medications: None       Allergies   Allergen Reactions    Penicillins Hives and Rash       PHYSICAL EXAM    PE limited by:     Objective   Vitals:   First set: Temperature: 99 1 °F (37 3 °C) (03/29/21 1325)  Pulse: 88 (03/29/21 1315)  Respirations: (!) 31 (03/29/21 1315)  Blood Pressure: (!) 172/80 (03/29/21 1315)  SpO2: 97 % (03/29/21 1315)    Primary Survey:   (A) Airway: patent no blood in the oropharynx  (B) Breathing:  Lungs are clear to auscultation bilaterally no cyanosis respiratory distress is noted  (C) Circulation: Pulses:   normal  (D) Disabliity:  GCS Total:  15  (E) Expose:  Completed    Secondary Survey: (Click on Physical Exam tab above)  Physical Exam  Vitals signs and nursing note reviewed  Constitutional:       Appearance: Normal appearance  She is well-developed  HENT:      Head: Normocephalic and atraumatic  Right Ear: External ear normal       Left Ear: External ear normal       Nose: Nose normal  No congestion  Mouth/Throat:      Mouth: Mucous membranes are moist       Pharynx: No oropharyngeal exudate or posterior oropharyngeal erythema  Eyes:      General: No scleral icterus          Right eye: No discharge  Left eye: No discharge  Extraocular Movements: Extraocular movements intact  Conjunctiva/sclera: Conjunctivae normal       Pupils: Pupils are equal, round, and reactive to light  Neck:      Musculoskeletal: Normal range of motion and neck supple  No neck rigidity or muscular tenderness  Cardiovascular:      Rate and Rhythm: Normal rate and regular rhythm  Pulmonary:      Effort: Pulmonary effort is normal  No respiratory distress  Breath sounds: Normal breath sounds  No wheezing  Abdominal:      General: Bowel sounds are normal       Palpations: Abdomen is soft  There is no mass  Tenderness: There is no abdominal tenderness  Musculoskeletal: Normal range of motion  Lymphadenopathy:      Cervical: No cervical adenopathy  Skin:     General: Skin is warm and dry  Capillary Refill: Capillary refill takes less than 2 seconds  Findings: Abrasion (left scalp) present  Neurological:      General: No focal deficit present  Mental Status: She is alert and oriented to person, place, and time  Psychiatric:         Behavior: Behavior normal          Cervical spine cleared by clinical criteria? Yes     Invasive Devices     None                 Lab Results:   Results Reviewed     None                 Imaging Studies:   Direct to CT: No  CT head wo contrast   Final Result by Danny Rojas MD (03/29 0499)      No acute intracranial abnormality  Workstation performed: [id number]               Procedures  Procedures         ED Course           MDM  Number of Diagnoses or Management Options  Diagnosis management comments: Status post fall CT head negative  Precautions prior to the patient recommended come to the ED start having any headache double vision double vision or nausea vomiting  Patient verbalized understand planned treatment    She states that she does have an appointment with her neurosurgeon for follow-up  Disposition  [facility] Transfer: No  Final diagnoses:   Fall, initial encounter   Scalp abrasion, non-infected     Time reflects when diagnosis was documented in both MDM as applicable and the Disposition within this note     Time User Action Codes Description Comment    3/29/2021  2:46 PM Andrew Ally Add [[id number]  HARMAN] Fall, initial encounter     3/29/2021  2:47 PM Andrew Ally Zeng [[id number]] Scalp abrasion, non-infected       ED Disposition     ED Disposition Condition Date/Time Comment    Discharge Stable Mon Mar 29, 2021  2:46 PM Kadie Kwan discharge to home/self care  Follow-up Information     Follow up With Specialties Details Why Contact Sammi Kaye MD [facility] Medicine Schedule an appointment as soon as possible for a visit in 2 days If symptoms worsen [address]  [phone]          Patient's Medications   Discharge Prescriptions    No medications on file     No discharge procedures on file      PDMP Review     None          ED Provider  Electronically Signed by         Aiyana Sloan MD  03/29/21 5402

## 2021-03-31 ENCOUNTER — OFFICE VISIT (OUTPATIENT)
Dept: INTERNAL MEDICINE CLINIC | Facility: CLINIC | Age: 71
End: 2021-03-31
Payer: MEDICARE

## 2021-03-31 VITALS
WEIGHT: 207.5 LBS | TEMPERATURE: 97.5 F | OXYGEN SATURATION: 98 % | HEART RATE: 75 BPM | BODY MASS INDEX: 36.77 KG/M2 | HEIGHT: 63 IN | SYSTOLIC BLOOD PRESSURE: 138 MMHG | DIASTOLIC BLOOD PRESSURE: 78 MMHG

## 2021-03-31 DIAGNOSIS — S09.90XD TRAUMATIC INJURY OF HEAD, SUBSEQUENT ENCOUNTER: Primary | ICD-10-CM

## 2021-03-31 PROCEDURE — 99213 OFFICE O/P EST LOW 20 MIN: CPT | Performed by: FAMILY MEDICINE

## 2021-04-01 PROBLEM — S09.90XA HEAD TRAUMA: Status: ACTIVE | Noted: 2021-04-01

## 2021-05-20 ENCOUNTER — TELEPHONE (OUTPATIENT)
Dept: INTERNAL MEDICINE CLINIC | Facility: CLINIC | Age: 71
End: 2021-05-20

## 2021-05-20 ENCOUNTER — HOSPITAL ENCOUNTER (OUTPATIENT)
Dept: RADIOLOGY | Facility: HOSPITAL | Age: 71
Discharge: HOME | End: 2021-05-20
Payer: MEDICARE

## 2021-05-20 DIAGNOSIS — S89.91XA INJURY OF RIGHT KNEE, INITIAL ENCOUNTER: ICD-10-CM

## 2021-05-20 DIAGNOSIS — S89.91XA INJURY OF RIGHT KNEE, INITIAL ENCOUNTER: Primary | ICD-10-CM

## 2021-05-20 PROCEDURE — 73564 X-RAY EXAM KNEE 4 OR MORE: CPT

## 2021-05-20 NOTE — TELEPHONE ENCOUNTER
Received a call from Gill Bunn stating that she fell today after she tripped over raised pavement  She went straight on her right knee and there is a small bruise but it hurts a lot  She is just wanting an XR order placed so that she can find out if there area any fractures  She stated that she won't be going until tomorrow due to her being out of town  Please advise

## 2021-06-07 DIAGNOSIS — I10 HYPERTENSION: ICD-10-CM

## 2021-06-07 RX ORDER — LOSARTAN POTASSIUM 50 MG/1
50 TABLET ORAL DAILY
Qty: 90 TABLET | Refills: 3 | Status: SHIPPED | OUTPATIENT
Start: 2021-06-07 | End: 2022-04-13

## 2021-06-22 ENCOUNTER — HOSPITAL ENCOUNTER (OUTPATIENT)
Dept: MAMMOGRAPHY | Facility: HOSPITAL | Age: 71
Discharge: HOME | End: 2021-06-22
Payer: MEDICARE

## 2021-06-22 VITALS — BODY MASS INDEX: 36.68 KG/M2 | WEIGHT: 207 LBS | HEIGHT: 63 IN

## 2021-06-22 DIAGNOSIS — Z12.31 VISIT FOR SCREENING MAMMOGRAM: ICD-10-CM

## 2021-06-22 PROCEDURE — 77063 BREAST TOMOSYNTHESIS BI: CPT

## 2021-06-22 PROCEDURE — 77067 SCR MAMMO BI INCL CAD: CPT

## 2021-07-26 ENCOUNTER — TELEPHONE (OUTPATIENT)
Dept: INTERNAL MEDICINE CLINIC | Facility: CLINIC | Age: 71
End: 2021-07-26

## 2021-07-26 NOTE — TELEPHONE ENCOUNTER
Did call Jose Alfredo Stanley to have her have her labs done  She hasn't had it done in over a year  Spoke with Delmi's daughter, and she relayed the message to Jose Alfredo Stanley

## 2021-09-13 ENCOUNTER — TELEPHONE (OUTPATIENT)
Dept: CARDIOLOGY CLINIC | Facility: HOSPITAL | Age: 71
End: 2021-09-13

## 2021-09-13 NOTE — TELEPHONE ENCOUNTER
Called patient to schedule her next appointment with cardiology  She is currently in [place] helping a friend and does not know when she will return  She will call us for an appointment when she is home

## 2021-09-16 DIAGNOSIS — Z01.812 PRE-PROCEDURAL LABORATORY EXAMINATIONS: ICD-10-CM

## 2021-09-16 DIAGNOSIS — I67.1 CEREBRAL ANEURYSM: Primary | ICD-10-CM

## 2021-09-17 ENCOUNTER — APPOINTMENT (OUTPATIENT)
Dept: LAB | Facility: HOSPITAL | Age: 71
End: 2021-09-17
Attending: NEUROLOGICAL SURGERY
Payer: MEDICARE

## 2021-09-17 ENCOUNTER — HOSPITAL ENCOUNTER (OUTPATIENT)
Dept: CT IMAGING | Facility: HOSPITAL | Age: 71
Discharge: HOME | End: 2021-09-17
Payer: MEDICARE

## 2021-09-17 DIAGNOSIS — I67.1 CEREBRAL ANEURYSM: ICD-10-CM

## 2021-09-17 DIAGNOSIS — Z01.812 PRE-PROCEDURAL LABORATORY EXAMINATIONS: ICD-10-CM

## 2021-09-17 LAB
BUN SERPL-MCNC: 16 MG/DL (ref 5–25)
CREAT SERPL-MCNC: 0.91 MG/DL (ref 0.6–1.3)
GFR SERPL CREATININE-BSD FRML MDRD: 63 ML/MIN/1.73SQ M

## 2021-09-17 PROCEDURE — 70496 CT ANGIOGRAPHY HEAD: CPT

## 2021-09-17 PROCEDURE — 84520 ASSAY OF UREA NITROGEN: CPT

## 2021-09-17 PROCEDURE — 36415 COLL VENOUS BLD VENIPUNCTURE: CPT

## 2021-09-17 PROCEDURE — 82565 ASSAY OF CREATININE: CPT

## 2021-09-17 RX ADMIN — IOHEXOL 100 ML: 350 INJECTION, SOLUTION INTRAVENOUS at 12:00

## 2021-12-20 ENCOUNTER — OFFICE VISIT (OUTPATIENT)
Dept: INTERNAL MEDICINE CLINIC | Facility: CLINIC | Age: 71
End: 2021-12-20
Payer: MEDICARE

## 2021-12-20 VITALS
OXYGEN SATURATION: 98 % | HEART RATE: 74 BPM | DIASTOLIC BLOOD PRESSURE: 76 MMHG | TEMPERATURE: 98 F | BODY MASS INDEX: 36.32 KG/M2 | WEIGHT: 205 LBS | SYSTOLIC BLOOD PRESSURE: 128 MMHG | HEIGHT: 63 IN

## 2021-12-20 DIAGNOSIS — E78.5 DYSLIPIDEMIA: ICD-10-CM

## 2021-12-20 DIAGNOSIS — I35.0 AORTIC STENOSIS, MILD: ICD-10-CM

## 2021-12-20 DIAGNOSIS — I10 ACCELERATED ESSENTIAL HYPERTENSION: ICD-10-CM

## 2021-12-20 DIAGNOSIS — E55.9 VITAMIN D DEFICIENCY: ICD-10-CM

## 2021-12-20 DIAGNOSIS — E11.9 TYPE 2 DIABETES MELLITUS WITHOUT COMPLICATION, WITHOUT LONG-TERM CURRENT USE OF INSULIN (HCC): Primary | ICD-10-CM

## 2021-12-20 PROCEDURE — 99214 OFFICE O/P EST MOD 30 MIN: CPT | Performed by: FAMILY MEDICINE

## 2022-04-13 DIAGNOSIS — I10 HYPERTENSION: ICD-10-CM

## 2022-04-13 RX ORDER — LOSARTAN POTASSIUM 50 MG/1
TABLET ORAL
Qty: 90 TABLET | Refills: 0 | Status: SHIPPED | OUTPATIENT
Start: 2022-04-13 | End: 2022-07-18 | Stop reason: SDUPTHER

## 2022-06-13 ENCOUNTER — TELEPHONE (OUTPATIENT)
Dept: INTERNAL MEDICINE CLINIC | Facility: CLINIC | Age: 72
End: 2022-06-13

## 2022-06-13 NOTE — TELEPHONE ENCOUNTER
Melissa Espinoza called and left a voicemail that someone called and left a voicemail for her  I called Melissa Olga back and she stated that it was an automotive message that she got and she was calling to see what it was in regards too  She stated that she has been down with her daughter and she stated that she needs an appointment  I transferred the call to Rebecca Jean

## 2022-06-22 ENCOUNTER — RA CDI HCC (OUTPATIENT)
Dept: OTHER | Facility: HOSPITAL | Age: 72
End: 2022-06-22

## 2022-06-22 NOTE — PROGRESS NOTES
[address]  coding opportunities       Chart reviewed, no opportunity found: CHART REVIEWED, NO OPPORTUNITY FOUND        Patients Insurance     Medicare Insurance: Medicare

## 2022-07-18 ENCOUNTER — OFFICE VISIT (OUTPATIENT)
Dept: INTERNAL MEDICINE CLINIC | Facility: CLINIC | Age: 72
End: 2022-07-18
Payer: MEDICARE

## 2022-07-18 VITALS
WEIGHT: 205 LBS | BODY MASS INDEX: 36.32 KG/M2 | TEMPERATURE: 97.3 F | HEART RATE: 88 BPM | HEIGHT: 63 IN | DIASTOLIC BLOOD PRESSURE: 90 MMHG | SYSTOLIC BLOOD PRESSURE: 142 MMHG | OXYGEN SATURATION: 96 %

## 2022-07-18 DIAGNOSIS — I10 HYPERTENSION: ICD-10-CM

## 2022-07-18 DIAGNOSIS — E11.9 TYPE 2 DIABETES MELLITUS WITHOUT COMPLICATION, WITHOUT LONG-TERM CURRENT USE OF INSULIN (HCC): Primary | ICD-10-CM

## 2022-07-18 DIAGNOSIS — E55.9 VITAMIN D DEFICIENCY: ICD-10-CM

## 2022-07-18 DIAGNOSIS — Z12.31 VISIT FOR SCREENING MAMMOGRAM: ICD-10-CM

## 2022-07-18 DIAGNOSIS — E78.5 DYSLIPIDEMIA: ICD-10-CM

## 2022-07-18 DIAGNOSIS — F43.21 GRIEVING: ICD-10-CM

## 2022-07-18 DIAGNOSIS — I35.0 AORTIC STENOSIS, MILD: ICD-10-CM

## 2022-07-18 DIAGNOSIS — I10 ACCELERATED ESSENTIAL HYPERTENSION: ICD-10-CM

## 2022-07-18 DIAGNOSIS — F33.9 DEPRESSION, RECURRENT (HCC): ICD-10-CM

## 2022-07-18 DIAGNOSIS — Z78.0 POSTMENOPAUSAL: ICD-10-CM

## 2022-07-18 DIAGNOSIS — E66.01 CLASS 2 SEVERE OBESITY DUE TO EXCESS CALORIES WITH SERIOUS COMORBIDITY AND BODY MASS INDEX (BMI) OF 36.0 TO 36.9 IN ADULT (HCC): ICD-10-CM

## 2022-07-18 DIAGNOSIS — F51.01 PRIMARY INSOMNIA: ICD-10-CM

## 2022-07-18 DIAGNOSIS — R35.1 NOCTURIA: ICD-10-CM

## 2022-07-18 PROCEDURE — 1123F ACP DISCUSS/DSCN MKR DOCD: CPT | Performed by: FAMILY MEDICINE

## 2022-07-18 PROCEDURE — 99214 OFFICE O/P EST MOD 30 MIN: CPT | Performed by: FAMILY MEDICINE

## 2022-07-18 PROCEDURE — G0439 PPPS, SUBSEQ VISIT: HCPCS | Performed by: FAMILY MEDICINE

## 2022-07-18 RX ORDER — MIRTAZAPINE 15 MG/1
15 TABLET, FILM COATED ORAL
Qty: 90 TABLET | Refills: 3 | Status: SHIPPED | OUTPATIENT
Start: 2022-07-18

## 2022-07-18 RX ORDER — LOSARTAN POTASSIUM 50 MG/1
50 TABLET ORAL DAILY
Qty: 90 TABLET | Refills: 1 | Status: SHIPPED | OUTPATIENT
Start: 2022-07-18

## 2022-07-18 NOTE — PATIENT INSTRUCTIONS
Medicare Preventive Visit Patient Instructions  Thank you for completing your Welcome to Medicare Visit or Medicare Annual Wellness Visit today  Your next wellness visit will be due in one year (7/19/2023)  The screening/preventive services that you may require over the next 5-10 years are detailed below  Some tests may not apply to you based off risk factors and/or age  Screening tests ordered at today's visit but not completed yet may show as past due  Also, please note that scanned in results may not display below  Preventive Screenings:  Service Recommendations Previous Testing/Comments   Colorectal Cancer Screening  * Colonoscopy    * Fecal Occult Blood Test (FOBT)/Fecal Immunochemical Test (FIT)  * Fecal DNA/Cologuard Test  * Flexible Sigmoidoscopy Age: 54-65 years old   Colonoscopy: every 10 years (may be performed more frequently if at higher risk)  OR  FOBT/FIT: every 1 year  OR  Cologuard: every 3 years  OR  Sigmoidoscopy: every 5 years  Screening may be recommended earlier than age 48 if at higher risk for colorectal cancer  Also, an individualized decision between you and your healthcare provider will decide whether screening between the ages of 74-80 would be appropriate  Colonoscopy: 12/08/2014  FOBT/FIT: Not on file  Cologuard: Not on file  Sigmoidoscopy: Not on file    Screening Current     Breast Cancer Screening Age: 36 years old  Frequency: every 1-2 years  Not required if history of left and right mastectomy Mammogram: 06/22/2021    Screening Current   Cervical Cancer Screening Between the ages of 21-29, pap smear recommended once every 3 years  Between the ages of 33-67, can perform pap smear with HPV co-testing every 5 years     Recommendations may differ for women with a history of total hysterectomy, cervical cancer, or abnormal pap smears in past  Pap Smear: Not on file    Screening Not Indicated   Hepatitis C Screening Once for adults born between 1945 and 1965  More frequently in patients at high risk for Hepatitis C Hep C Antibody: 12/10/2018    Screening Current   Diabetes Screening 1-2 times per year if you're at risk for diabetes or have pre-diabetes Fasting glucose: 102 mg/dL   A1C: 6 0 %    Screening Not Indicated  History Diabetes   Cholesterol Screening Once every 5 years if you don't have a lipid disorder  May order more often based on risk factors  Lipid panel: 02/24/2020    Screening Current     Other Preventive Screenings Covered by Medicare:  1  Abdominal Aortic Aneurysm (AAA) Screening: covered once if your at risk  You're considered to be at risk if you have a family history of AAA  2  Lung Cancer Screening: covers low dose CT scan once per year if you meet all of the following conditions: (1) Age 50-69; (2) No signs or symptoms of lung cancer; (3) Current smoker or have quit smoking within the last 15 years; (4) You have a tobacco smoking history of at least 30 pack years (packs per day multiplied by number of years you smoked); (5) You get a written order from a healthcare provider  3  Glaucoma Screening: covered annually if you're considered high risk: (1) You have diabetes OR (2) Family history of glaucoma OR (3)  aged 48 and older OR (3)  American aged 72 and older  3  Osteoporosis Screening: covered every 2 years if you meet one of the following conditions: (1) You're estrogen deficient and at risk for osteoporosis based off medical history and other findings; (2) Have a vertebral abnormality; (3) On glucocorticoid therapy for more than 3 months; (4) Have primary hyperparathyroidism; (5) On osteoporosis medications and need to assess response to drug therapy  · Last bone density test (DXA Scan): 08/08/2017  5  HIV Screening: covered annually if you're between the age of 12-76  Also covered annually if you are younger than 13 and older than 72 with risk factors for HIV infection   For pregnant patients, it is covered up to 3 times per pregnancy  Immunizations:  Immunization Recommendations   Influenza Vaccine Annual influenza vaccination during flu season is recommended for all persons aged >= 6 months who do not have contraindications   Pneumococcal Vaccine (Prevnar and Pneumovax)  * Prevnar = PCV13  * Pneumovax = PPSV23   Adults 25-60 years old: 1-3 doses may be recommended based on certain risk factors  Adults 72 years old: Prevnar (PCV13) vaccine recommended followed by Pneumovax (PPSV23) vaccine  If already received PPSV23 since turning 65, then PCV13 recommended at least one year after PPSV23 dose  Hepatitis B Vaccine 3 dose series if at intermediate or high risk (ex: diabetes, end stage renal disease, liver disease)   Tetanus (Td) Vaccine - COST NOT COVERED BY MEDICARE PART B Following completion of primary series, a booster dose should be given every 10 years to maintain immunity against tetanus  Td may also be given as tetanus wound prophylaxis  Tdap Vaccine - COST NOT COVERED BY MEDICARE PART B Recommended at least once for all adults  For pregnant patients, recommended with each pregnancy  Shingles Vaccine (Shingrix) - COST NOT COVERED BY MEDICARE PART B  2 shot series recommended in those aged 48 and above     Health Maintenance Due:      Topic Date Due    DXA SCAN  08/08/2019    Breast Cancer Screening: Mammogram  06/22/2023    Colorectal Cancer Screening  12/08/2024    Hepatitis C Screening  Completed     Immunizations Due:      Topic Date Due    COVID-19 Vaccine (4 - Booster for Moderna series) 04/23/2022    Influenza Vaccine (1) 09/01/2022     Advance Directives   What are advance directives? Advance directives are legal documents that state your wishes and plans for medical care  These plans are made ahead of time in case you lose your ability to make decisions for yourself  Advance directives can apply to any medical decision, such as the treatments you want, and if you want to donate organs     What are the types of advance directives? There are many types of advance directives, and each state has rules about how to use them  You may choose a combination of any of the following:  · Living will: This is a written record of the treatment you want  You can also choose which treatments you do not want, which to limit, and which to stop at a certain time  This includes surgery, medicine, IV fluid, and tube feedings  · Durable power of  for healthcare [facility]): This is a written record that states who you want to make healthcare choices for you when you are unable to make them for yourself  This person, called a proxy, is usually a family member or a friend  You may choose more than 1 proxy  · Do not resuscitate (DNR) order:  A DNR order is used in case your heart stops beating or you stop breathing  It is a request not to have certain forms of treatment, such as CPR  A DNR order may be included in other types of advance directives  · Medical directive: This covers the care that you want if you are in a coma, near death, or unable to make decisions for yourself  You can list the treatments you want for each condition  Treatment may include pain medicine, surgery, blood transfusions, dialysis, IV or tube feedings, and a ventilator (breathing machine)  · Values history: This document has questions about your views, beliefs, and how you feel and think about life  This information can help others choose the care that you would choose  Why are advance directives important? An advance directive helps you control your care  Although spoken wishes may be used, it is better to have your wishes written down  Spoken wishes can be misunderstood, or not followed  Treatments may be given even if you do not want them  An advance directive may make it easier for your family to make difficult choices about your care     Depression   Depression  is a medical condition that causes feelings of sadness or hopelessness that do not go away  Depression may cause you to lose interest in things you used to enjoy  These feelings may interfere with your daily life  Call your local emergency number (911 in the [address] [place] [address] [address]) if:   · You think about harming yourself or someone else  · You have done something on purpose to hurt yourself  The following resources are available at any time to help you, if needed:   · [facility]: 0-542.178.5646 (7-890-793-IEPX)   · Suicide Hotline: 8-507.338.5252 (1-213-PARETFT)   · For a list of international numbers: https://save org/find-help/international-resources/  Treatment for depression may include medicine to relieve depression  Medicine is often used together with therapy  Therapy is a way for you to talk about your feelings and anything that may be causing depression  Therapy can be done alone or in a group  It may also be done with family members or a significant other  · Get regular physical activity  · Create a regular sleep schedule  · Eat a variety of healthy foods  · Do not drink alcohol or use drugs  Weight Management   Why it is important to manage your weight:  Being overweight increases your risk of health conditions such as heart disease, high blood pressure, type 2 diabetes, and certain types of cancer  It can also increase your risk for osteoarthritis, sleep apnea, and other respiratory problems  Aim for a slow, steady weight loss  Even a small amount of weight loss can lower your risk of health problems  How to lose weight safely:  A safe and healthy way to lose weight is to eat fewer calories and get regular exercise  You can lose up about 1 pound a week by decreasing the number of calories you eat by 500 calories each day  Healthy meal plan for weight management:  A healthy meal plan includes a variety of foods, contains fewer calories, and helps you stay healthy  A healthy meal plan includes the following:  · Eat whole-grain foods more often    A healthy meal plan should contain fiber  Fiber is the part of grains, fruits, and vegetables that is not broken down by your body  Whole-grain foods are healthy and provide extra fiber in your diet  Some examples of whole-grain foods are whole-wheat breads and pastas, oatmeal, brown rice, and bulgur  · Eat a variety of vegetables every day  Include dark, leafy greens such as spinach, kale, shaun greens, and mustard greens  Eat yellow and orange vegetables such as carrots, sweet potatoes, and winter squash  · Eat a variety of fruits every day  Choose fresh or canned fruit (canned in its own juice or light syrup) instead of juice  Fruit juice has very little or no fiber  · Eat low-fat dairy foods  Drink fat-free (skim) milk or 1% milk  Eat fat-free yogurt and low-fat cottage cheese  Try low-fat cheeses such as mozzarella and other reduced-fat cheeses  · Choose meat and other protein foods that are low in fat  Choose beans or other legumes such as split peas or lentils  Choose fish, skinless poultry (chicken or turkey), or lean cuts of red meat (beef or pork)  Before you cook meat or poultry, cut off any visible fat  · Use less fat and oil  Try baking foods instead of frying them  Add less fat, such as margarine, sour cream, regular salad dressing and mayonnaise to foods  Eat fewer high-fat foods  Some examples of high-fat foods include french fries, doughnuts, ice cream, and cakes  · Eat fewer sweets  Limit foods and drinks that are high in sugar  This includes candy, cookies, regular soda, and sweetened drinks  Exercise:  Exercise at least 30 minutes per day on most days of the week  Some examples of exercise include walking, biking, dancing, and swimming  You can also fit in more physical activity by taking the stairs instead of the elevator or parking farther away from stores  Ask your healthcare provider about the best exercise plan for you        © Copyright [employer] 2018 Information is for End User's use only and may not be sold, redistributed or otherwise used for commercial purposes   All illustrations and images included in CareNotes® are the copyrighted property of A D A M , Inc  or Sanaz Smith

## 2022-07-18 NOTE — PROGRESS NOTES
Assessment and Plan:     Problem List Items Addressed This Visit        Endocrine    Type 2 diabetes mellitus without complication, without long-term current use of insulin (HCC) - Primary    Relevant Orders    Comprehensive metabolic panel    Hemoglobin A1C    Lipid panel       Cardiovascular and Mediastinum    Accelerated essential hypertension    Relevant Medications    losartan (COZAAR) 50 mg tablet    Other Relevant Orders    CBC and differential    Aortic stenosis, mild       Other    Dyslipidemia    Relevant Orders    Comprehensive metabolic panel    Lipid panel    TSH, 3rd generation    Class 2 severe obesity due to excess calories with serious comorbidity and body mass index (BMI) of 36 0 to 36 9 in adult [facility])    Vitamin D deficiency    Grieving    Nocturia    Relevant Orders    Urine culture    UA (URINE) with reflex to Scope    Primary insomnia    Relevant Medications    mirtazapine (REMERON) 15 mg tablet    Depression, recurrent (HCC)    Relevant Medications    mirtazapine (REMERON) 15 mg tablet      Other Visit Diagnoses     Hypertension        Relevant Medications    losartan (COZAAR) 50 mg tablet    Other Relevant Orders    CBC and differential    Visit for screening mammogram        Relevant Orders    Mammo screening bilateral w 3d & cad    Postmenopausal        Relevant Orders    DXA bone density spine hip and pelvis        Orders and recommendations as noted above  Discussed with her getting her laboratory testing as well as screening testing completed  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  Discussed with her that it has been almost 2 years since she has had laboratory testing completed  Recent stressors discussed with her with the loss of her  and her son within the last year so  Discussed her mood with her  She declines any medications for this  Sleep issues discussed  Will start Remeron as noted above    Potential side effects discussed  Importance of following up with Ophthalmology and checking her feet regularly discussed  She declined foot exam   Blood pressure mildly elevated  Continue with the losartan  Importance of good blood pressure control discussed especially with the known history of the aneurysm  Watch salt intake  Continue follow-up with Cardiology regularly  Stressed the importance of regular follow-up  The check lipid panel  History of vitamin-D supplementation for deficiency in the past   Not currently taking regularly  Will check vitamin-D level with upcoming laboratory testing  Try to remain as active as possible  Tylenol if needed for knee in other joint symptoms  Will have her follow up in about 3-5 months or sooner if needed depending on the results of her laboratory testing  Preventive health issues were discussed with patient, and age appropriate screening tests were ordered as noted in patient's After Visit Summary  Personalized health advice and appropriate referrals for health education or preventive services given if needed, as noted in patient's After Visit Summary  History of Present Illness:     Patient presents for a Medicare Wellness Visit    She presents for routine follow-up as well as Medicare wellness  Has generally been doing about the same  Still grieving the loss of her  and her son  Has remained very busy  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  Has been staying with her daughter at times  Did not have any laboratory testing or screening testing completed since this is more difficult to schedule at present  Having some increased joint symptoms especially into the knees  Has significant difficulty with sleeping  Sleeps very little and feels increasingly tired  Difficulty concentrating at times because of fatigue  Appetite has been variable  Denies any nausea, vomiting, or diarrhea  Denies any headaches    Is overdue to follow-up with neuro surgery for her known aneurysm  Tolerating her losartan on a regular basis  Denies any headaches or localized weakness  Denies any chest pain or palpitations  Is due to follow-up with Cardiology as well  Denies any chest pain or palpitations  Does not follow her blood sugars regularly  Does not always watch her diet  Is overdue to follow-up with Ophthalmology  Declines foot exam      Patient Care Team:  Giovana Garcia MD as PCP - MD Alan Álvarez MD Ulice Ripa, DO Jodean Chesterfield, MD Pepper Spar, MD     Review of Systems:     Review of Systems   Constitutional: Positive for activity change and fatigue  Negative for appetite change, chills and fever  HENT: Negative for congestion and rhinorrhea  Eyes: Negative for visual disturbance  Respiratory: Negative for cough, chest tightness and shortness of breath  Cardiovascular: Negative for chest pain and palpitations  Gastrointestinal: Negative for abdominal pain, blood in stool, diarrhea, nausea and vomiting  Endocrine: Negative for polydipsia, polyphagia and polyuria  Genitourinary: Negative for dysuria, frequency and urgency  Musculoskeletal: Positive for arthralgias  Negative for gait problem  Skin: Negative for color change  Neurological: Negative for dizziness and headaches  Hematological: Does not bruise/bleed easily  Psychiatric/Behavioral: Positive for decreased concentration, dysphoric mood and sleep disturbance  Negative for confusion  The patient is not nervous/anxious           Problem List:     Patient Active Problem List   Diagnosis    Carpal tunnel syndrome    Accelerated essential hypertension    Dyslipidemia    Class 2 severe obesity due to excess calories with serious comorbidity and body mass index (BMI) of 36 0 to 36 9 in [facility])    Aortic stenosis, mild    Complex endometrial hyperplasia    Fatigue    Female pelvic pain    Type 2 diabetes mellitus without complication, without long-term current use of insulin (HCC)    Osteopenia    Primary osteoarthritis of right knee    Tendonitis of left hip    Thickened endometrium    Asymptomatic varicose veins    Vitamin D deficiency    Closed fracture of multiple ribs of left side with routine healing    Fall    Cerebral aneurysm    Abnormal electromyography    Hyperhidrosis    Grieving    Abnormal mammogram    Anxiety    Elevated TSH    Sciatica of right side    Right flank pain    Chronic left flank pain    Premature ventricular contractions (PVCs) (VPCs)    Head trauma    Nocturia    Primary insomnia    Depression, recurrent (HCC)      Past Medical and Surgical History:     Past Medical History:   Diagnosis Date    Arthritis     Carpal tunnel syndrome     unspecified laterality    Cataract     Cerebral aneurysm     Decreased body height     last assessed 3/17/14    Heart murmur     Hypercholesteremia     Hypertension     Motion sickness     Obesity     Periodic heart flutter     Pneumonia     x1    PONV (postoperative nausea and vomiting)     Rosacea     Vertigo     Wears glasses     Wears partial dentures     upper     Past Surgical History:   Procedure Laterality Date    CARPAL TUNNEL RELEASE Bilateral      SECTION      x1    COLONOSCOPY      IR BIOPSY LYMPH NODE  2018    IR CEREBRAL ANGIOGRAPHY  10/12/2018    HI LAPAROSCOPY W TOT HYSTERECTUTERUS <=250 GRAM  W TUBE/OVARY N/A 2017    Procedure: HYSTERECTOMY LAPAROSCOPIC TOTAL, BSO, Cystoscopy;  Surgeon: Alan Ponce MD;  Location: [facility];  Service: Gynecology    HI REVISE MEDIAN N/CARPAL TUNNEL SURG Left 2016    Procedure: RELEASE CARPAL TUNNEL;  Surgeon: Christina Neely MD;  Location: [facility];  Service: Orthopedics      Family History:     Family History   Problem Relation Age of Onset    Arthritis Mother     Hypertension Mother     Stroke Mother         syndrome    Diabetes Father  Sudden death Father         instantaneous cardiac    Hyperlipidemia Sister     Colon cancer Sister 78    Sudden death Brother         instananeous cardiac    Hodgkin's lymphoma Daughter 24    No Known Problems Daughter     No Known Problems Daughter     No Known Problems Daughter     No Known Problems Son     No Known Problems Son     No Known Problems Maternal Aunt     No Known Problems Paternal Aunt     No Known Problems Paternal Aunt       Social History:     Social History     Socioeconomic History    Marital status:      Spouse name: None    Number of children: None    Years of education: None    Highest education level: None   Occupational History    None   Tobacco Use    Smoking status: Never Smoker    Smokeless tobacco: Never Used   Vaping Use    Vaping Use: Never used   Substance and Sexual Activity    Alcohol use: No    Drug use: No    Sexual activity: None   Other Topics Concern    None   Social History Narrative    Stress at work     Social Determinants of Health     Financial Resource Strain: Not on file   Food Insecurity: Not on file   Transportation Needs: Not on file   Physical Activity: Not on file   Stress: Not on file   Social Connections: Not on file   Intimate Partner Violence: Not on file   Housing Stability: Not on file      Medications and Allergies:     Current Outpatient Medications   Medication Sig Dispense Refill    Coenzyme Q10 (COQ-10) 100 MG CAPS Take by mouth daily      losartan (COZAAR) 50 mg tablet Take 1 tablet (50 mg total) by mouth daily 90 tablet 1    metroNIDAZOLE (METROGEL) 0 75 % gel       mirtazapine (REMERON) 15 mg tablet Take 1 tablet (15 mg total) by mouth daily at bedtime as needed (insomnia) 90 tablet 3     No current facility-administered medications for this visit       Allergies   Allergen Reactions    Penicillins Hives and Rash      Immunizations:     Immunization History   Administered Date(s) Administered    COVID-19 MODERNA VACC 0 5 ML IM 01/05/2021, 02/04/2021, 12/23/2021    INFLUENZA 10/12/2016, 11/02/2018, 11/01/2019, 11/17/2021    Influenza Split High Dose Preservative Free IM 10/12/2016    Influenza, high dose seasonal 0 7 mL 11/02/2018, 09/16/2020    Influenza, seasonal, injectable 09/12/2011, 09/01/2013    Pneumococcal Conjugate 13-Valent 06/20/2016    Pneumococcal Polysaccharide PPV23 10/19/2007, 11/06/2018    Zoster 11/04/2015      Health Maintenance:         Topic Date Due    DXA SCAN  08/08/2019    Breast Cancer Screening: Mammogram  06/22/2023    Colorectal Cancer Screening  12/08/2024    Hepatitis C Screening  Completed         Topic Date Due    COVID-19 Vaccine (4 - Booster for Moderna series) 04/23/2022    Influenza Vaccine (1) 09/01/2022      Medicare Screening Tests and Risk Assessments:     Mamadou Harris is here for her Subsequent Wellness visit  Last Medicare Wellness visit information reviewed, patient interviewed and updates made to the record today  Health Risk Assessment:   Patient rates overall health as good  Patient feels that their physical health rating is same  Patient is satisfied with their life  Eyesight was rated as same  Hearing was rated as same  Patient feels that their emotional and mental health rating is same  Patients states they are never, rarely angry  Patient states they are often unusually tired/fatigued  Pain experienced in the last 7 days has been some  Patient's pain rating has been 4/10  Patient states that she has experienced no weight loss or gain in last 6 months  Depression Screening:   PHQ-2 Score: 3  PHQ-9 Score: 10      Fall Risk Screening: In the past year, patient has experienced: no history of falling in past year      Urinary Incontinence Screening:   Patient has not leaked urine accidently in the last six months  Home Safety:  Patient has trouble with stairs inside or outside of their home   Patient has working smoke alarms and has working carbon monoxide detector  Home safety hazards include: none  Nutrition:   Current diet is Regular  Medications:   Patient is currently taking over-the-counter supplements  OTC medications include: see medication list  Patient is able to manage medications  Activities of Daily Living (ADLs)/Instrumental Activities of Daily Living (IADLs):   Walk and transfer into and out of bed and chair?: Yes  Dress and groom yourself?: Yes    Bathe or shower yourself?: Yes    Feed yourself? Yes  Do your laundry/housekeeping?: Yes  Manage your money, pay your bills and track your expenses?: Yes  Make your own meals?: Yes    Do your own shopping?: Yes    Previous Hospitalizations:   Any hospitalizations or ED visits within the last 12 months?: No      Advance Care Planning:   Living will: Yes    Durable POA for healthcare:  Yes    Advanced directive: Yes    Provider agrees with end of life decisions: Yes      Cognitive Screening:   Provider or family/friend/caregiver concerned regarding cognition?: No    PREVENTIVE SCREENINGS      Cardiovascular Screening:    General: Screening Current and Risks and Benefits Discussed    Due for: Lipid Panel      Diabetes Screening:     General: Screening Not Indicated, History Diabetes and Risks and Benefits Discussed    Due for: Blood Glucose      Colorectal Cancer Screening:     General: Screening Current      Breast Cancer Screening:     General: Screening Current and Risks and Benefits Discussed    Due for: Mammogram        Cervical Cancer Screening:    General: Screening Not Indicated      Osteoporosis Screening:    General: Risks and Benefits Discussed    Due for: DXA Axial      Abdominal Aortic Aneurysm (AAA) Screening:        General: Screening Not Indicated      Lung Cancer Screening:     General: Screening Not Indicated      Hepatitis C Screening:    General: Screening Current    Screening, Brief Intervention, and Referral to Treatment (SBIRT)    Screening  Typical number of drinks in a day: 0  Typical number of drinks in a week: 0  Interpretation: Low risk drinking behavior  Single Item Drug Screening:  How often have you used an illegal drug (including marijuana) or a prescription medication for non-medical reasons in the past year? never    Single Item Drug Screen Score: 0  Interpretation: Negative screen for possible drug use disorder    Brief Intervention  Alcohol & drug use screenings were reviewed  No concerns regarding substance use disorder identified  No exam data present     Physical Exam:     /90 (BP Location: Left arm, Patient Position: Sitting, Cuff Size: Large)   Pulse 88   Temp (!) 97 3 °F (36 3 °C)   Ht 5' 3" (1 6 m)   Wt 93 kg (205 lb)   LMP  (LMP Unknown)   SpO2 96%   BMI 36 31 kg/m²     Physical Exam  Vitals and nursing note reviewed  Constitutional:       General: She is not in acute distress  Appearance: She is well-developed, well-groomed and overweight  HENT:      Head: Normocephalic and atraumatic  Eyes:      Conjunctiva/sclera: Conjunctivae normal    Neck:      Thyroid: No thyromegaly  Vascular: No carotid bruit  Cardiovascular:      Rate and Rhythm: Normal rate and regular rhythm  Heart sounds: No murmur heard  Pulmonary:      Effort: Pulmonary effort is normal  No respiratory distress  Breath sounds: Normal breath sounds  Abdominal:      Palpations: Abdomen is soft  Tenderness: There is no abdominal tenderness  Musculoskeletal:      Cervical back: Neck supple  Comments: Degenerative changes bilateral knees   Lymphadenopathy:      Cervical: No cervical adenopathy  Skin:     General: Skin is warm and dry  Neurological:      Mental Status: She is alert  Psychiatric:         Mood and Affect: Mood normal  Affect is tearful  Speech: Speech normal          Behavior: Behavior is cooperative           Cognition and Memory: Cognition and memory normal           Analia Her MD

## 2022-10-07 ENCOUNTER — HOSPITAL ENCOUNTER (OUTPATIENT)
Dept: MAMMOGRAPHY | Facility: HOSPITAL | Age: 72
Discharge: HOME | End: 2022-10-07
Payer: MEDICARE

## 2022-10-07 ENCOUNTER — HOSPITAL ENCOUNTER (OUTPATIENT)
Dept: BONE DENSITY | Facility: HOSPITAL | Age: 72
Discharge: HOME | End: 2022-10-07
Payer: MEDICARE

## 2022-10-07 VITALS — HEIGHT: 63 IN | BODY MASS INDEX: 36.33 KG/M2 | WEIGHT: 205.03 LBS

## 2022-10-07 DIAGNOSIS — Z78.0 POSTMENOPAUSAL: ICD-10-CM

## 2022-10-07 DIAGNOSIS — Z12.31 ENCOUNTER FOR SCREENING MAMMOGRAM FOR MALIGNANT NEOPLASM OF BREAST: ICD-10-CM

## 2022-10-07 DIAGNOSIS — Z12.31 VISIT FOR SCREENING MAMMOGRAM: ICD-10-CM

## 2022-10-07 PROCEDURE — 77063 BREAST TOMOSYNTHESIS BI: CPT

## 2022-10-07 PROCEDURE — 77067 SCR MAMMO BI INCL CAD: CPT

## 2022-10-07 PROCEDURE — 77080 DXA BONE DENSITY AXIAL: CPT

## 2022-12-20 ENCOUNTER — RA CDI HCC (OUTPATIENT)
Dept: OTHER | Facility: HOSPITAL | Age: 72
End: 2022-12-20

## 2022-12-28 ENCOUNTER — TELEPHONE (OUTPATIENT)
Dept: INTERNAL MEDICINE CLINIC | Facility: CLINIC | Age: 72
End: 2022-12-28

## 2022-12-28 NOTE — TELEPHONE ENCOUNTER
Patient called to cancel her appt for tomorrow  She stated her vertigo was acting up and she was sick, she can't make the appt  Did offer to switch her to a sick appt but patient declined  She stated she really hasn't eaten in two days and throwing up        Did try to defer patient to ER to be seen, patient declined going to the ER  Said she gets like this, doctor Jailene Emanuel is aware and she is ok  She stated she'll call back to reschedule her appt

## 2023-01-13 DIAGNOSIS — I10 HYPERTENSION: ICD-10-CM

## 2023-01-14 RX ORDER — LOSARTAN POTASSIUM 50 MG/1
TABLET ORAL
Qty: 90 TABLET | Refills: 0 | Status: SHIPPED | OUTPATIENT
Start: 2023-01-14

## 2023-02-23 ENCOUNTER — RA CDI HCC (OUTPATIENT)
Dept: OTHER | Facility: HOSPITAL | Age: 73
End: 2023-02-23

## 2023-02-25 ENCOUNTER — APPOINTMENT (OUTPATIENT)
Dept: LAB | Facility: HOSPITAL | Age: 73
End: 2023-02-25

## 2023-02-25 DIAGNOSIS — E11.9 TYPE 2 DIABETES MELLITUS WITHOUT COMPLICATION, WITHOUT LONG-TERM CURRENT USE OF INSULIN (HCC): ICD-10-CM

## 2023-02-25 DIAGNOSIS — I10 HYPERTENSION: ICD-10-CM

## 2023-02-25 DIAGNOSIS — R35.1 NOCTURIA: ICD-10-CM

## 2023-02-25 DIAGNOSIS — I10 ACCELERATED ESSENTIAL HYPERTENSION: ICD-10-CM

## 2023-02-25 DIAGNOSIS — E78.5 DYSLIPIDEMIA: ICD-10-CM

## 2023-02-25 LAB
ALBUMIN SERPL BCP-MCNC: 3.3 G/DL (ref 3.5–5)
ALP SERPL-CCNC: 96 U/L (ref 46–116)
ALT SERPL W P-5'-P-CCNC: 10 U/L (ref 12–78)
ANION GAP SERPL CALCULATED.3IONS-SCNC: 7 MMOL/L (ref 4–13)
AST SERPL W P-5'-P-CCNC: 18 U/L (ref 5–45)
BASOPHILS # BLD AUTO: 0.04 THOUSANDS/ÂΜL (ref 0–0.1)
BASOPHILS NFR BLD AUTO: 1 % (ref 0–1)
BILIRUB SERPL-MCNC: 0.3 MG/DL (ref 0.2–1)
BUN SERPL-MCNC: 13 MG/DL (ref 5–25)
CALCIUM ALBUM COR SERPL-MCNC: 9.7 MG/DL (ref 8.3–10.1)
CALCIUM SERPL-MCNC: 9.1 MG/DL (ref 8.3–10.1)
CHLORIDE SERPL-SCNC: 106 MMOL/L (ref 96–108)
CHOLEST SERPL-MCNC: 255 MG/DL
CO2 SERPL-SCNC: 29 MMOL/L (ref 21–32)
CREAT SERPL-MCNC: 0.82 MG/DL (ref 0.6–1.3)
EOSINOPHIL # BLD AUTO: 0.16 THOUSAND/ÂΜL (ref 0–0.61)
EOSINOPHIL NFR BLD AUTO: 2 % (ref 0–6)
ERYTHROCYTE [DISTWIDTH] IN BLOOD BY AUTOMATED COUNT: 13.1 % (ref 11.6–15.1)
EST. AVERAGE GLUCOSE BLD GHB EST-MCNC: 120 MG/DL
GFR SERPL CREATININE-BSD FRML MDRD: 70 ML/MIN/1.73SQ M
GLUCOSE P FAST SERPL-MCNC: 100 MG/DL (ref 65–99)
HBA1C MFR BLD: 5.8 %
HCT VFR BLD AUTO: 39.1 % (ref 34.8–46.1)
HDLC SERPL-MCNC: 59 MG/DL
HGB BLD-MCNC: 12.6 G/DL (ref 11.5–15.4)
IMM GRANULOCYTES # BLD AUTO: 0.01 THOUSAND/UL (ref 0–0.2)
IMM GRANULOCYTES NFR BLD AUTO: 0 % (ref 0–2)
LDLC SERPL CALC-MCNC: 180 MG/DL (ref 0–100)
LYMPHOCYTES # BLD AUTO: 1.77 THOUSANDS/ÂΜL (ref 0.6–4.47)
LYMPHOCYTES NFR BLD AUTO: 26 % (ref 14–44)
MCH RBC QN AUTO: 30.4 PG (ref 26.8–34.3)
MCHC RBC AUTO-ENTMCNC: 32.2 G/DL (ref 31.4–37.4)
MCV RBC AUTO: 94 FL (ref 82–98)
MONOCYTES # BLD AUTO: 0.46 THOUSAND/ÂΜL (ref 0.17–1.22)
MONOCYTES NFR BLD AUTO: 7 % (ref 4–12)
NEUTROPHILS # BLD AUTO: 4.44 THOUSANDS/ÂΜL (ref 1.85–7.62)
NEUTS SEG NFR BLD AUTO: 64 % (ref 43–75)
NONHDLC SERPL-MCNC: 196 MG/DL
NRBC BLD AUTO-RTO: 0 /100 WBCS
PLATELET # BLD AUTO: 279 THOUSANDS/UL (ref 149–390)
PMV BLD AUTO: 9.2 FL (ref 8.9–12.7)
POTASSIUM SERPL-SCNC: 3.9 MMOL/L (ref 3.5–5.3)
PROT SERPL-MCNC: 7.2 G/DL (ref 6.4–8.4)
RBC # BLD AUTO: 4.14 MILLION/UL (ref 3.81–5.12)
SODIUM SERPL-SCNC: 142 MMOL/L (ref 135–147)
TRIGL SERPL-MCNC: 80 MG/DL
TSH SERPL DL<=0.05 MIU/L-ACNC: 3.37 UIU/ML (ref 0.45–4.5)
WBC # BLD AUTO: 6.88 THOUSAND/UL (ref 4.31–10.16)

## 2023-03-02 ENCOUNTER — OFFICE VISIT (OUTPATIENT)
Dept: INTERNAL MEDICINE CLINIC | Facility: CLINIC | Age: 73
End: 2023-03-02

## 2023-03-02 VITALS
HEART RATE: 87 BPM | SYSTOLIC BLOOD PRESSURE: 142 MMHG | HEIGHT: 63 IN | WEIGHT: 203 LBS | BODY MASS INDEX: 35.97 KG/M2 | OXYGEN SATURATION: 96 % | TEMPERATURE: 98.6 F | DIASTOLIC BLOOD PRESSURE: 84 MMHG

## 2023-03-02 DIAGNOSIS — M25.511 CHRONIC RIGHT SHOULDER PAIN: ICD-10-CM

## 2023-03-02 DIAGNOSIS — E78.5 DYSLIPIDEMIA: ICD-10-CM

## 2023-03-02 DIAGNOSIS — G89.29 CHRONIC RIGHT SHOULDER PAIN: ICD-10-CM

## 2023-03-02 DIAGNOSIS — I35.0 AORTIC STENOSIS, MILD: ICD-10-CM

## 2023-03-02 DIAGNOSIS — I67.1 CEREBRAL ANEURYSM: ICD-10-CM

## 2023-03-02 DIAGNOSIS — I10 ACCELERATED ESSENTIAL HYPERTENSION: ICD-10-CM

## 2023-03-02 DIAGNOSIS — E11.9 TYPE 2 DIABETES MELLITUS WITHOUT COMPLICATION, WITHOUT LONG-TERM CURRENT USE OF INSULIN (HCC): Primary | ICD-10-CM

## 2023-03-02 DIAGNOSIS — E55.9 VITAMIN D DEFICIENCY: ICD-10-CM

## 2023-03-02 PROBLEM — F33.9 DEPRESSION, RECURRENT (HCC): Status: RESOLVED | Noted: 2022-07-18 | Resolved: 2023-03-02

## 2023-03-02 RX ORDER — ATORVASTATIN CALCIUM 10 MG/1
10 TABLET, FILM COATED ORAL DAILY
Qty: 90 TABLET | Refills: 3 | Status: SHIPPED | OUTPATIENT
Start: 2023-03-02

## 2023-03-03 NOTE — PROGRESS NOTES
Assessment/Plan:    No problem-specific Assessment & Plan notes found for this encounter  Diagnoses and all orders for this visit:    Type 2 diabetes mellitus without complication, without long-term current use of insulin (HCC)  -     Hemoglobin A1C; Future    Aortic stenosis, mild  -     CBC and differential; Future  -     Echo complete w/ contrast if indicated; Future    Accelerated essential hypertension  -     CBC and differential; Future    Cerebral aneurysm  -     CBC and differential; Future    Vitamin D deficiency    Dyslipidemia  -     Comprehensive metabolic panel; Future  -     Lipid panel; Future  -     TSH, 3rd generation; Future  -     atorvastatin (LIPITOR) 10 mg tablet; Take 1 tablet (10 mg total) by mouth daily    Chronic right shoulder pain  -     XR shoulder 2+ vw right; Future    Other orders  -     CALCIUM CARBONATE-VITAMIN D PO; Take 1 tablet by mouth daily   Orders and recommendations as noted above  Reviewed recent laboratory testing with her  Hemoglobin A1c controlled at present  Continue to watch diet  Continue to follow-up with ophthalmology regularly  Check feet daily  Cholesterol significantly elevated  Discussed restarting cholesterol medication  She is willing to do this  Prescription given for the atorvastatin  Advised her that she can try to start this 3 days a week and gradually increase to daily  Potential side effects discussed  Blood pressure controlled  Continue with the losartan  Continue to follow-up with specialist regarding the follow-up for the cerebral aneurysm  Continue with calcium and vitamin D supplementation  Right shoulder pain discussed  Prescription given for an x-ray to have done at her convenience  She does have known mild to moderate aortic stenosis  Has not followed up with cardiology recently  Prescription given for echocardiogram and advised following up with cardiology when she is able    She is up-to-date on her mammogram and bone density  Continue with mammograms yearly and bone densities every other year  We will have her follow-up in about 3 to 5 months with laboratory testing prior to that visit  Follow-up sooner if needed  Discussed her mood with her  She does not feel that she is depressed as much as it has been a stressful few years with the loss of her son and her   Advised her to watch for any worsening  Subjective:      Patient ID: Morales Hardwick is a 74 y o  female  She presents for routine follow-up  Has generally been doing relatively well  She is spending most of her time at her daughter's in [place] to help watch her granddaughter  She has been enjoying this but it is tiring at times  She is dealing relatively well with the loss of her  and her son over the last few years  Does get down at times  Her children are concerned that she is depressed but she does not feel that she is  She feels that it is more of an adjustment and the normal grieving process  Has been having some right shoulder pain on and off  Feels that it is related to using her right arm to get in and out of her son-in-law's truck  Tolerating her losartan without difficulty  Denies any headaches or localized weakness  Does follow-up with neurosurgery regarding the diagnosis of the cerebral aneurysm  Denies any headaches  Some joint symptoms especially into the knees and the right shoulder  Has been dealing with these relatively well  Appetite has been stable  Tries to watch her carbohydrate intake  Denies any vision issues        The following portions of the patient's history were reviewed and updated as appropriate:   She  has a past medical history of Arthritis, Carpal tunnel syndrome, Cataract, Cerebral aneurysm, Decreased body height, Heart murmur, Hypercholesteremia, Hypertension, Motion sickness, Obesity, Periodic heart flutter, Pneumonia, PONV (postoperative nausea and vomiting), Rosacea, Vertigo, Wears glasses, and Wears partial dentures  She   Patient Active Problem List    Diagnosis Date Noted   • Nocturia 2022   • Primary insomnia 2022   • Head trauma 2021   • Premature ventricular contractions (PVCs) (VPCs) 2020   • Right flank pain 2020   • Chronic left flank pain 2020   • Sciatica of right side 2019   • Elevated TSH 2019   • Anxiety 2019   • Abnormal mammogram 2018   • Hyperhidrosis 2018   • Grieving 2018   • Abnormal electromyography 2018   • Closed fracture of multiple ribs of left side with routine healing 2018   • Fall 2018   • Cerebral aneurysm 2018   • Aortic stenosis, mild 2018   • Complex endometrial hyperplasia 2017   • Thickened endometrium 2017   • Female pelvic pain 2017   • Primary osteoarthritis of right knee 2016   • Carpal tunnel syndrome 2016   • Tendonitis of left hip 2015   • Vitamin D deficiency 2015   • Type 2 diabetes mellitus without complication, without long-term current use of insulin [facility] ) 2015   • Dyslipidemia 2014   • Osteopenia 2014   • Accelerated essential hypertension 2013   • Fatigue 2012   • Asymptomatic varicose veins 2012     She  has a past surgical history that includes Colonoscopy;  section; pr neuroplasty &/transpos median nrv carpal tunne (Left, 2016); Carpal tunnel release (Bilateral); pr laps total hysterect 250 gm/< w/rmvl tube/ovary (N/A, 2017); IR cerebral angiography (10/12/2018); and IR biopsy lymph node (2018)  Her family history includes Arthritis in her mother; Colon cancer (age of onset: 78) in her sister; Diabetes in her father; Hodgkin's lymphoma (age of onset: 24) in her daughter; Hyperlipidemia in her sister;  Hypertension in her mother; No Known Problems in her daughter, daughter, daughter, maternal aunt, paternal aunt, paternal aunt, son, and son; Stroke in her mother; Sudden death in her brother and father  She  reports that she has never smoked  She has never used smokeless tobacco  She reports that she does not drink alcohol and does not use drugs  Current Outpatient Medications   Medication Sig Dispense Refill   • atorvastatin (LIPITOR) 10 mg tablet Take 1 tablet (10 mg total) by mouth daily 90 tablet 3   • CALCIUM CARBONATE-VITAMIN D PO Take 1 tablet by mouth daily     • losartan (COZAAR) 50 mg tablet Take 1 tablet by mouth once daily 90 tablet 0   • mirtazapine (REMERON) 15 mg tablet Take 1 tablet (15 mg total) by mouth daily at bedtime as needed (insomnia) 90 tablet 3     No current facility-administered medications for this visit  Current Outpatient Medications on File Prior to Visit   Medication Sig   • CALCIUM CARBONATE-VITAMIN D PO Take 1 tablet by mouth daily   • losartan (COZAAR) 50 mg tablet Take 1 tablet by mouth once daily   • mirtazapine (REMERON) 15 mg tablet Take 1 tablet (15 mg total) by mouth daily at bedtime as needed (insomnia)     No current facility-administered medications on file prior to visit  She is allergic to penicillins       Review of Systems   Constitutional: Positive for fatigue  Negative for activity change, appetite change, chills and fever  HENT: Negative for congestion and rhinorrhea  Eyes: Negative for visual disturbance  Respiratory: Negative for cough, chest tightness and shortness of breath  Cardiovascular: Negative for chest pain and palpitations  Gastrointestinal: Negative for abdominal pain, blood in stool, diarrhea, nausea and vomiting  Endocrine: Negative for polydipsia, polyphagia and polyuria  Genitourinary: Negative for dysuria, frequency and urgency  Musculoskeletal: Positive for arthralgias  Negative for gait problem  Skin: Negative for color change  Neurological: Negative for dizziness and headaches  Hematological: Does not bruise/bleed easily     Psychiatric/Behavioral: Positive for dysphoric mood  Negative for confusion and sleep disturbance  The patient is not nervous/anxious  Objective:      /84 (BP Location: Left arm, Patient Position: Sitting, Cuff Size: Large)   Pulse 87   Temp 98 6 °F (37 °C)   Ht 5' 3" (1 6 m)   Wt 92 1 kg (203 lb)   LMP  (LMP Unknown)   SpO2 96%   BMI 35 96 kg/m²          Physical Exam  Vitals and nursing note reviewed  Constitutional:       General: She is not in acute distress  Appearance: She is well-developed, well-groomed and overweight  HENT:      Head: Normocephalic and atraumatic  Eyes:      General:         Right eye: No discharge  Left eye: No discharge  Conjunctiva/sclera: Conjunctivae normal       Pupils: Pupils are equal, round, and reactive to light  Cardiovascular:      Rate and Rhythm: Normal rate and regular rhythm  Heart sounds: Murmur heard  Systolic murmur is present with a grade of 2/6  No friction rub  No gallop  Pulmonary:      Effort: No respiratory distress  Breath sounds: No wheezing or rales  Abdominal:      General: Bowel sounds are normal  There is no distension  Tenderness: There is no abdominal tenderness  Musculoskeletal:      Comments: Degenerative changes bilateral knees   Lymphadenopathy:      Cervical: No cervical adenopathy  Skin:     General: Skin is warm and dry  Neurological:      Mental Status: She is alert and oriented to person, place, and time  Psychiatric:         Mood and Affect: Mood and affect normal          Speech: Speech normal          Behavior: Behavior normal  Behavior is cooperative  Cognition and Memory: Cognition and memory normal          BMI Counseling: Body mass index is 35 96 kg/m²   The BMI is above normal  Nutrition recommendations include decreasing portion sizes, encouraging healthy choices of fruits and vegetables, decreasing fast food intake, consuming healthier snacks, limiting drinks that contain sugar, moderation in carbohydrate intake and reducing intake of cholesterol  Exercise recommendations include exercising 3-5 times per week  Rationale for BMI follow-up plan is due to patient being overweight or obese

## 2023-05-16 DIAGNOSIS — I10 HYPERTENSION: ICD-10-CM

## 2023-05-16 RX ORDER — LOSARTAN POTASSIUM 50 MG/1
TABLET ORAL
Qty: 90 TABLET | Refills: 0 | Status: SHIPPED | OUTPATIENT
Start: 2023-05-16

## 2023-06-21 ENCOUNTER — OFFICE VISIT (OUTPATIENT)
Dept: INTERNAL MEDICINE CLINIC | Facility: CLINIC | Age: 73
End: 2023-06-21
Payer: MEDICARE

## 2023-06-21 VITALS
SYSTOLIC BLOOD PRESSURE: 158 MMHG | BODY MASS INDEX: 36.32 KG/M2 | OXYGEN SATURATION: 99 % | DIASTOLIC BLOOD PRESSURE: 90 MMHG | HEIGHT: 63 IN | HEART RATE: 82 BPM | WEIGHT: 205 LBS | TEMPERATURE: 97.9 F

## 2023-06-21 DIAGNOSIS — F43.21 GRIEVING: ICD-10-CM

## 2023-06-21 DIAGNOSIS — F51.01 PRIMARY INSOMNIA: ICD-10-CM

## 2023-06-21 DIAGNOSIS — F41.8 DEPRESSION WITH ANXIETY: Primary | ICD-10-CM

## 2023-06-21 PROCEDURE — 99214 OFFICE O/P EST MOD 30 MIN: CPT | Performed by: FAMILY MEDICINE

## 2023-06-21 RX ORDER — DULOXETIN HYDROCHLORIDE 30 MG/1
30 CAPSULE, DELAYED RELEASE ORAL DAILY
Qty: 90 CAPSULE | Refills: 3 | Status: SHIPPED | OUTPATIENT
Start: 2023-06-21

## 2023-06-22 NOTE — PROGRESS NOTES
Assessment/Plan:    No problem-specific Assessment & Plan notes found for this encounter  Diagnoses and all orders for this visit:    Depression with anxiety  -     DULoxetine (CYMBALTA) 30 mg delayed release capsule; Take 1 capsule (30 mg total) by mouth daily    Grieving    Primary insomnia     Orders and recommendations as noted above  Discussed recent mood changes and irritability with her  Discussed recent significant stressors over the last few years with the loss of her son and her   Discussed stressors with traveling between households and babysitting regularly  Continue with the Remeron if needed for sleep  Discussed options to help with the anxiety and depression symptoms  We will start her on Cymbalta as noted above  Dosage may need to be increased  Watch for any worsening  Adequate sleep recommended  Has slip for laboratory testing to have completed prior to her next regular visit  She will be returning to the area and staying here more frequently as of mid to the end of July  We will have her follow-up after that or sooner if needed  Spent approximately 30 minutes with her discussing the issues and options for treatment  Over half of this time was spent in counseling  Subjective:      Patient ID: Cesario Suazo is a 74 y o  female  She presents for problem visit  Has been noticing increased mood changes  Has been more irritable and short with her family  Feels that this is not her usual personality  Becomes more tearful and emotional   Has more difficulty concentrating  Still with some issues with sleep  Remeron helps somewhat but sometimes has the opposite effect  Not enjoying things as much  Has not been doing activities that she previously enjoyed  Continues to babysit her youngest grandchild frequently  Had been staying with them but will be likely returning to the area for the long-term in July    Has had numerous stressors over the last few years with the death of her son from a glioblastoma and the death of her   Has some stressors living with her adult daughter as well  The following portions of the patient's history were reviewed and updated as appropriate:   She  has a past medical history of Arthritis, Carpal tunnel syndrome, Cataract, Cerebral aneurysm, Decreased body height, Heart murmur, Hypercholesteremia, Hypertension, Motion sickness, Obesity, Periodic heart flutter, Pneumonia, PONV (postoperative nausea and vomiting), Rosacea, Vertigo, Wears glasses, and Wears partial dentures  She   Patient Active Problem List    Diagnosis Date Noted   • Depression with anxiety 2023   • Nocturia 2022   • Primary insomnia 2022   • Head trauma 2021   • Premature ventricular contractions (PVCs) (VPCs) 2020   • Right flank pain 2020   • Chronic left flank pain 2020   • Sciatica of right side 2019   • Elevated TSH 2019   • Anxiety 2019   • Abnormal mammogram 2018   • Hyperhidrosis 2018   • Grieving 2018   • Abnormal electromyography 2018   • Closed fracture of multiple ribs of left side with routine healing 2018   • Fall 2018   • Cerebral aneurysm 2018   • Aortic stenosis, mild 2018   • Complex endometrial hyperplasia 2017   • Thickened endometrium 2017   • Female pelvic pain 2017   • Primary osteoarthritis of right knee 2016   • Carpal tunnel syndrome 2016   • Tendonitis of left hip 2015   • Vitamin D deficiency 2015   • Type 2 diabetes mellitus without complication, without long-term current use of insulin ([facility] ) 2015   • Dyslipidemia 2014   • Osteopenia 2014   • Accelerated essential hypertension 2013   • Fatigue 2012   • Asymptomatic varicose veins 2012     She  has a past surgical history that includes Colonoscopy;   section; pr neuroplasty &/transpos median nrv carpal tunne (Left, 4/8/2016); Carpal tunnel release (Bilateral); pr laps total hysterect 250 gm/< w/rmvl tube/ovary (N/A, 8/28/2017); IR cerebral angiography (10/12/2018); and IR biopsy lymph node (12/17/2018)  Her family history includes Arthritis in her mother; Colon cancer (age of onset: 78) in her sister; Diabetes in her father; Hodgkin's lymphoma (age of onset: 24) in her daughter; Hyperlipidemia in her sister; Hypertension in her mother; No Known Problems in her daughter, daughter, daughter, maternal aunt, paternal aunt, paternal aunt, son, and son; Stroke in her mother; Sudden death in her brother and father  She  reports that she has never smoked  She has never used smokeless tobacco  She reports that she does not drink alcohol and does not use drugs  Current Outpatient Medications   Medication Sig Dispense Refill   • CALCIUM CARBONATE-VITAMIN D PO Take 1 tablet by mouth daily     • DULoxetine (CYMBALTA) 30 mg delayed release capsule Take 1 capsule (30 mg total) by mouth daily 90 capsule 3   • losartan (COZAAR) 50 mg tablet Take 1 tablet by mouth once daily 90 tablet 0   • mirtazapine (REMERON) 15 mg tablet Take 1 tablet (15 mg total) by mouth daily at bedtime as needed (insomnia) 90 tablet 3     No current facility-administered medications for this visit  Current Outpatient Medications on File Prior to Visit   Medication Sig   • CALCIUM CARBONATE-VITAMIN D PO Take 1 tablet by mouth daily   • losartan (COZAAR) 50 mg tablet Take 1 tablet by mouth once daily   • mirtazapine (REMERON) 15 mg tablet Take 1 tablet (15 mg total) by mouth daily at bedtime as needed (insomnia)     No current facility-administered medications on file prior to visit  She is allergic to penicillins       Review of Systems   Constitutional: Positive for activity change, appetite change and fatigue  Respiratory: Negative for cough  Musculoskeletal: Positive for arthralgias  Neurological: Negative for headaches  "  Psychiatric/Behavioral: Positive for agitation, behavioral problems, decreased concentration, dysphoric mood and sleep disturbance  The patient is nervous/anxious  Objective:      /90 (BP Location: Left arm, Patient Position: Sitting, Cuff Size: Large)   Pulse 82   Temp 97 9 °F (36 6 °C)   Ht 5' 3\" (1 6 m)   Wt 93 kg (205 lb)   LMP  (LMP Unknown)   SpO2 99%   BMI 36 31 kg/m²          Physical Exam  Vitals and nursing note reviewed  Constitutional:       Appearance: She is well-developed, well-groomed and overweight  Cardiovascular:      Rate and Rhythm: Normal rate and regular rhythm  Pulmonary:      Effort: No tachypnea  Neurological:      Mental Status: She is alert  Psychiatric:         Mood and Affect: Mood is depressed  Affect is flat and tearful  Speech: Speech normal          Behavior: Behavior is cooperative           Cognition and Memory: Cognition and memory normal          "

## 2023-07-12 DIAGNOSIS — I10 HYPERTENSION: ICD-10-CM

## 2023-07-12 RX ORDER — LOSARTAN POTASSIUM 50 MG/1
50 TABLET ORAL DAILY
Qty: 90 TABLET | Refills: 1 | Status: SHIPPED | OUTPATIENT
Start: 2023-07-12

## 2023-09-01 ENCOUNTER — APPOINTMENT (EMERGENCY)
Dept: CT IMAGING | Facility: HOSPITAL | Age: 73
End: 2023-09-01
Payer: MEDICARE

## 2023-09-01 ENCOUNTER — APPOINTMENT (EMERGENCY)
Dept: RADIOLOGY | Facility: HOSPITAL | Age: 73
End: 2023-09-01
Payer: MEDICARE

## 2023-09-01 ENCOUNTER — HOSPITAL ENCOUNTER (EMERGENCY)
Facility: HOSPITAL | Age: 73
Discharge: HOME | End: 2023-09-01
Attending: EMERGENCY MEDICINE
Payer: MEDICARE

## 2023-09-01 VITALS
HEART RATE: 75 BPM | DIASTOLIC BLOOD PRESSURE: 94 MMHG | RESPIRATION RATE: 16 BRPM | TEMPERATURE: 98.5 F | SYSTOLIC BLOOD PRESSURE: 171 MMHG | BODY MASS INDEX: 35.54 KG/M2 | WEIGHT: 200.62 LBS | OXYGEN SATURATION: 97 %

## 2023-09-01 DIAGNOSIS — S09.90XA CLOSED HEAD INJURY, INITIAL ENCOUNTER: ICD-10-CM

## 2023-09-01 DIAGNOSIS — S60.222A CONTUSION OF LEFT HAND, INITIAL ENCOUNTER: ICD-10-CM
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
## 2023-09-01 PROCEDURE — 73130 X-RAY EXAM OF HAND: CPT

## 2023-09-01 PROCEDURE — 99284 EMERGENCY DEPT VISIT MOD MDM: CPT

## 2023-09-01 PROCEDURE — 70450 CT HEAD/BRAIN W/O DYE: CPT

## 2023-09-01 PROCEDURE — 72125 CT NECK SPINE W/O DYE: CPT

## 2023-09-01 PROCEDURE — 99284 EMERGENCY DEPT VISIT MOD MDM: CPT | Performed by: PHYSICIAN ASSISTANT

## 2023-09-01 PROCEDURE — 71045 X-RAY EXAM CHEST 1 VIEW: CPT

## 2023-09-01 RX ORDER — ACETAMINOPHEN 325 MG/1
650 TABLET ORAL ONCE
Status: COMPLETED | OUTPATIENT
Start: 2023-09-01 | End: 2023-09-01

## 2023-09-01 RX ADMIN — ACETAMINOPHEN 650 MG: 325 TABLET, FILM COATED ORAL at 16:48

## 2023-09-01 NOTE — DISCHARGE INSTRUCTIONS
Rest, ice, compression, elevation. ACE wrap for hand contusion. This will likely heal over the next 1-2 weeks. If you have any persistent hand pain, consider outpatient follow up with orthopedics. OTC tylenol or similar as needed for pain relief. Follow up with PCP or return to ER as needed.

## 2023-09-01 NOTE — ED PROVIDER NOTES
Emergency Department Trauma Note  Arthur Estrada 74 y.o. female MRN: [id number]  Unit/Bed#: RM06/RM06 Encounter: [id number]      Trauma Alert: Trauma Acuity: Trauma Evaluation  Model of Arrival: Mode of Arrival: Other (Comment) (POV) via    Trauma Team: Current Providers  Attending Provider: Vandana Aguirre MD  Physician Assistant: Danni Turner PA-C  Registered Nurse: Jarrett Ledezma RN  Consultants:     None      History of Present Illness     Chief Complaint:   Chief Complaint   Patient presents with   • Fall     Leaning on a banister on her deck, banister gave away and she fell onto the ground striking head on ground, denies any LOC, denies any thinners      HPI:  Arthur Estrada is a 74 y.o. female who presents with daughter for evaluation after a fall. Mechanism:Details of Incident: fall off of deck at home, was leaning on banister, when the banister gave out causing her to fall approx 4 feet to the ground. struck head on ground. denies LOC, denies thinners Injury Date: 09/01/23   Injury Occurence Location - [address] [place] [address]: carbon    74year old female with PMH HTN, HLD, h/o cerebral aneurysm presents ambulatory from home accompanied with her daughter for further evaluation after a fall. This occurred today within the past hour. Pt reports she was outside and leaned against a banister on her deck and this gave out and caused her to fall. She states she was leaning forward, grabbing an overgrown branch to trim it when the banister gave out. She reports falling off the deck face first into the grass/bushes. She reports hitting her head. She notes she had some neck pain on the left but this is feeling improved at this time. No reported LOC. Denies aspirin or blood thinners. No reported prodromal symptoms such as dizziness, lightheadedness. Denies chest pain, SOB, N/V, abdominal pain, back pain. She reports a mild headache.   She reports pain in her left hand which is bruised and swollen. She has been able to move it. Denies numbness, tingling, weakness. She was able to get up on her own after the fall. She did walk into the department. She drove to her daughter's house after this happened as she lost her cell phone during the fall. No reported alleviating factors. No specific treatments tried. Pt does appear anxious regarding the event. History provided by:  Patient and medical records   used: No    Fall  Mechanism of injury: fall    Fall:     Height of fall: Maybe 3 feet    Impact surface:  Grass    Point of impact:  Head  Prior to arrival data:     Patient ambulatory at scene: yes      Blood loss:  None    Orientation at scene:  Person, time, situation and place    Loss of consciousness: no      Amnesic to event: no      Medications administered:  None    Immobilization:  None  Associated symptoms: headaches and neck pain    Associated symptoms: no abdominal pain, no back pain, no chest pain, no difficulty breathing, no loss of consciousness, no nausea and no vomiting    Risk factors: no anticoagulation therapy      Review of Systems   Constitutional: Negative. Negative for chills, fatigue and fever. HENT: Negative. Negative for congestion, rhinorrhea and sore throat. Eyes: Negative. Negative for visual disturbance. Respiratory: Negative. Negative for cough, shortness of breath and wheezing. Cardiovascular: Negative. Negative for chest pain, palpitations and leg swelling. Gastrointestinal: Negative. Negative for abdominal pain, constipation, diarrhea, nausea and vomiting. Genitourinary: Negative. Negative for dysuria, flank pain, frequency and hematuria. Musculoskeletal: Positive for arthralgias and neck pain. Negative for back pain and myalgias. Skin: Negative. Negative for rash. Neurological: Positive for headaches. Negative for dizziness, loss of consciousness, syncope, weakness, light-headedness and numbness. Psychiatric/Behavioral: Negative. Negative for confusion. All other systems reviewed and are negative.       Historical Information     Immunizations:   Immunization History   Administered Date(s) Administered   • COVID-19 MODERNA VACC 0.5 ML IM 2021, 2021, 2021   • INFLUENZA 10/12/2016, 2018, 2019, 2021   • Influenza Split High Dose Preservative Free IM 10/12/2016   • Influenza, high dose seasonal 0.7 mL 2018, 2020   • Influenza, seasonal, injectable 2011, 2013   • Pneumococcal Conjugate 13-Valent 2016   • Pneumococcal Polysaccharide PPV23 10/19/2007, 2018   • Zoster 2015       Past Medical History:   Diagnosis Date   • Arthritis    • Carpal tunnel syndrome     unspecified laterality   • Cataract    • Cerebral aneurysm    • Decreased body height     last assessed 3/17/14   • Heart murmur    • Hypercholesteremia    • Hypertension    • Motion sickness    • Obesity    • Periodic heart flutter    • Pneumonia     x1   • PONV (postoperative nausea and vomiting)    • Rosacea    • Vertigo    • Wears glasses    • Wears partial dentures     upper       Family History   Problem Relation Age of Onset   • Arthritis Mother    • Hypertension Mother    • Stroke Mother         syndrome   • Diabetes Father    • Sudden death Father         instantaneous cardiac   • Hyperlipidemia Sister    • Colon cancer Sister 78   • Hodgkin's lymphoma Daughter 24   • No Known Problems Daughter    • No Known Problems Daughter    • No Known Problems Daughter    • Sudden death Brother         instananeous cardiac   • No Known Problems Son    • No Known Problems Son    • No Known Problems Maternal Aunt    • No Known Problems Paternal Aunt    • No Known Problems Paternal Aunt      Past Surgical History:   Procedure Laterality Date   • CARPAL TUNNEL RELEASE Bilateral    •  SECTION      x1   • COLONOSCOPY     • IR BIOPSY LYMPH NODE  2018   • IR CEREBRAL ANGIOGRAPHY  10/12/2018   • WV LAPS TOTAL HYSTERECT 250 GM/< W/RMVL TUBE/OVARY N/A 8/28/2017    Procedure: HYSTERECTOMY LAPAROSCOPIC TOTAL, BSO, Cystoscopy;  Surgeon: Jaci Cannon MD;  Location: [facility];  Service: Gynecology   • WV NEUROPLASTY &/TRANSPOS MEDIAN NRV CARPAL Luz Elena Noon Left 4/8/2016    Procedure: RELEASE CARPAL TUNNEL;  Surgeon: Edna Romero MD;  Location: [facility];  Service: Orthopedics     Social History     Tobacco Use   • Smoking status: Never   • Smokeless tobacco: Never   Vaping Use   • Vaping Use: Never used   Substance Use Topics   • Alcohol use: No   • Drug use: No     E-Cigarette/Vaping   • E-Cigarette Use Never User      E-Cigarette/Vaping Substances   • Nicotine No    • THC No    • CBD No    • Flavoring No    • Other No    • Unknown No        Family History: non-contributory    Meds/Allergies   Prior to Admission Medications   Prescriptions Last Dose Informant Patient Reported? Taking? CALCIUM CARBONATE-VITAMIN D PO  Self Yes No   Sig: Take 1 tablet by mouth daily   DULoxetine (CYMBALTA) 30 mg delayed release capsule   No No   Sig: Take 1 capsule (30 mg total) by mouth daily   losartan (COZAAR) 50 mg tablet   No No   Sig: Take 1 tablet (50 mg total) by mouth daily   mirtazapine (REMERON) 15 mg tablet   No No   Sig: Take 1 tablet (15 mg total) by mouth daily at bedtime as needed (insomnia)      Facility-Administered Medications: None       Allergies   Allergen Reactions   • Penicillins Hives and Rash       PHYSICAL EXAM    Objective   Vitals:   First set: Temperature: 97.5 °F (36.4 °C) (09/01/23 1552)  Pulse: 93 (09/01/23 1552)  Respirations: 20 (09/01/23 1552)  Blood Pressure: (!) 180/94 (09/01/23 1552)  SpO2: 96 % (09/01/23 1552)    Primary Survey:   (A) Airway: airway patent. Pt phonating normally. (B) Breathing: lung sounds present bilaterally.   (C) Circulation: Pulses:   normal, carotid  2/4, pedal  2/4, radial  2/4 and femoral  2/4; there is no visible external hemorrhage. (D) Disabliity:  GCS Total:  15, Eye Opening:   Spontaneous = 4, Motor Response: Obeys commands = 6 and Verbal Response:  Oriented = 5  (E) Expose:  Completed    Secondary survey was completed for all other injuries. Secondary Survey: (Click on Physical Exam tab above)  Physical Exam  Vitals and nursing note reviewed. Constitutional:       General: She is awake. She is not in acute distress. Appearance: She is well-developed and overweight. She is not toxic-appearing. HENT:      Head: Normocephalic and atraumatic. No raccoon eyes, Mcghee's sign, abrasion, contusion or laceration. Comments: There are some grass stains noted in her hair on the top of head. Right Ear: Hearing, tympanic membrane, ear canal and external ear normal. No hemotympanum. Left Ear: Hearing, tympanic membrane, ear canal and external ear normal. No hemotympanum. Nose: Nose normal.      Mouth/Throat:      Mouth: Mucous membranes are moist.      Tongue: Tongue does not deviate from midline. Pharynx: Oropharynx is clear. Uvula midline. Eyes:      General: Lids are normal. Gaze aligned appropriately. No scleral icterus. Extraocular Movements: Extraocular movements intact. Conjunctiva/sclera: Conjunctivae normal.      Pupils: Pupils are equal, round, and reactive to light. Comments: +glasses   Neck:      Trachea: Trachea and phonation normal. No tracheal deviation. Cardiovascular:      Rate and Rhythm: Normal rate and regular rhythm. Pulses:           Radial pulses are 2+ on the right side and 2+ on the left side. Dorsalis pedis pulses are 2+ on the right side and 2+ on the left side. Posterior tibial pulses are 2+ on the right side and 2+ on the left side. Heart sounds: Normal heart sounds, S1 normal and S2 normal. No murmur heard. Pulmonary:      Effort: Pulmonary effort is normal. No tachypnea or respiratory distress.       Breath sounds: Normal breath sounds. No wheezing, rhonchi or rales. Chest:      Chest wall: No tenderness. Abdominal:      General: Bowel sounds are normal. There is no distension. Palpations: Abdomen is soft. Tenderness: There is no abdominal tenderness. There is no guarding or rebound. Musculoskeletal:      Left wrist: No swelling, deformity or bony tenderness. Normal range of motion. Left hand: Swelling and tenderness present. No deformity, lacerations or bony tenderness. Normal range of motion. Normal sensation. Normal capillary refill. Normal pulse. Hands:       Cervical back: Normal range of motion and neck supple. No swelling, deformity, signs of trauma or bony tenderness. No spinous process tenderness. Thoracic back: No bony tenderness. Lumbar back: No bony tenderness. Right lower leg: No edema. Left lower leg: No edema. Comments: Contusion and swelling of left dorsum of hand overlying the 5th metacarpal.   Skin:     General: Skin is warm and dry. Capillary Refill: Capillary refill takes less than 2 seconds. Findings: Bruising (left hand) present. No abrasion, laceration, rash or wound. Neurological:      General: No focal deficit present. Mental Status: She is alert and oriented to person, place, and time. GCS: GCS eye subscore is 4. GCS verbal subscore is 5. GCS motor subscore is 6. Cranial Nerves: No cranial nerve deficit. Sensory: Sensation is intact. No sensory deficit. Motor: Motor function is intact. No weakness or abnormal muscle tone. Gait: Gait normal.      Comments: Pt ambulatory to exam room without assistance. Psychiatric:         Mood and Affect: Mood normal.         Speech: Speech normal.         Behavior: Behavior normal. Behavior is cooperative. Cervical spine cleared by clinical criteria?  Yes     Invasive Devices     None                 Lab Results:   Results Reviewed     None                 Imaging Studies: Direct to CT: Yes  TRAUMA - CT head wo contrast   Final Result by Zeke Christine DO (09/01 1627)   No acute intracranial abnormality. Workstation performed: [id number]         TRAUMA - CT spine cervical wo contrast   Final Result by Zeke Christine DO (09/01 1637)   No cervical spine fracture or traumatic malalignment. Workstation performed: [id number]         XR Trauma chest portable   Final Result by Abraham Christie MD (09/01 1611)      No acute cardiopulmonary disease. Workstation performed: [id number]         XR hand 3+ views LEFT    (Results Pending)         Procedures  Procedures         ED Course  ED Course as of 09/01/23 1713   Fri Sep 01, 2023   1606 XR Trauma chest portable  Independently viewed and interpreted by me - no acute cardiopulmonary process, old healed left sided rib fractures noted seen on prior xray; pending official read. 1620 CT scans performed and pending interpretation; on my prelim interpretation, no noted ICH.   1625 XR hand 3+ views LEFT  Independently viewed and interpreted by me - no fracture or acute osseous findings, presence of rings and pulse oximeter on fingers, mod to severe degenerative change at 1st MCP joint; pending official read. [facility] CT head wo contrast  IMPRESSION:  No acute intracranial abnormality. 1639 TRAUMA - CT spine cervical wo contrast  IMPRESSION:  No cervical spine fracture or traumatic malalignment. 1640 Patient re-evaluated after negative CT C-spine. Patient has no midline C-spine tenderness, no apparent intoxication or altered mental status, no focal neuro deficits, and no distracting injuries. Cervical collar removed. Pt and daughter updated on results. Will discharge with symptomatic management and outpatient follow up with strict return precautions. Medical Decision Making  73 yo female presenting for evaluation after a fall.   This was related to a mechanical fall after a rotten banister on her deck gave way as she was attempting to trim overgrown bushes. She was made a trauma evaluation upon arrival given mechanism. Will obtain CT head and C spine, CXR as well as XR left hand. Pt has history of osteopenia per results of latest DEXA scan. Pt was offered and declined any analgesics. I do not suspect any intra-thoracic or intra-abdominal traumatic process. Pt has a non focal neuro exam.  She has been ambulatory. Work up obtained as noted above. CXR shows old healed left rib fractures which pt has been aware of.  CT head without acute findings. CT C spine without fracture or acute osseous findings. Reviewed degenerative findings. Xray of the left hand without noted fracture. Likely contusion. ACE wrap applied. Pt neurovascularly intact post application. BP mildly elevated. Pt contributes this to being worked up about the situation and her anxiety. Advised to continue to monitor and recheck with PCP. Discussed continued symptomatic/supportive care. Continue OTC meds such as tylenol for pain relief. Strict head injury return precautions outlined. Advised outpatient follow up with PCP or return to ER for change in condition as outlined. Pt and daughter verbalized understanding and had no further questions. Please refer to above ER course for further details/discussion. Closed head injury, initial encounter: acute illness or injury  Contusion of left hand, initial encounter: acute illness or injury  Fall, initial encounter: acute illness or injury  Amount and/or Complexity of Data Reviewed  External Data Reviewed: radiology and notes. Radiology: ordered and independent interpretation performed. Decision-making details documented in ED Course. Risk  OTC drugs. Prescription drug management.                   Disposition  [facility] Transfer: No  Final diagnoses:   Fall, initial encounter   Closed head injury, initial encounter   Contusion of left hand, initial encounter     Time reflects when diagnosis was documented in both MDM as applicable and the Disposition within this note     Time User Action Codes Description Comment    9/1/2023  4:40 PM Mira Galaviz Add [I28. MZYW] Fall, initial encounter     9/1/2023  4:40 PM Mira Galaviz Add [S09.90XA] Closed head injury, initial encounter     9/1/2023  4:40 PM Mira Galaviz Add [S60.222A] Contusion of left hand, initial encounter       ED Disposition     ED Disposition   Discharge    Condition   Stable    Date/Time   Fri Sep 1, 2023  4:40 PM    Comment   Ck Benson discharge to home/self care. Follow-up Information     Follow up With Specialties Details Why Contact Info Additional Information    Katey Malcolm MD Family Medicine Schedule an appointment as soon as possible for a visit   [address] [phone]       [facility] Emergency Department Emergency Medicine  As needed [address] [place] [id number]  [address] [place] [address] Emergency Department, [address] [place] [address], [facility]        Discharge Medication List as of 9/1/2023  4:48 PM      CONTINUE these medications which have NOT CHANGED    Details   CALCIUM CARBONATE-VITAMIN D PO Take 1 tablet by mouth daily, Historical Med      DULoxetine (CYMBALTA) 30 mg delayed release capsule Take 1 capsule (30 mg total) by mouth daily, Starting Wed 6/21/2023, Normal      losartan (COZAAR) 50 mg tablet Take 1 tablet (50 mg total) by mouth daily, Starting Wed 7/12/2023, Normal      mirtazapine (REMERON) 15 mg tablet Take 1 tablet (15 mg total) by mouth daily at bedtime as needed (insomnia), Starting Mon 7/18/2022, Normal           No discharge procedures on file.     PDMP Review     None          ED Provider  Electronically Signed by         Clari Jackson PA-C  09/01/23 [id number]

## 2023-09-08 ENCOUNTER — TELEPHONE (OUTPATIENT)
Dept: NEUROSURGERY | Facility: CLINIC | Age: 73
End: 2023-09-08

## 2023-09-08 NOTE — TELEPHONE ENCOUNTER
Reached out to patient to gather more information however she did not answer. Left a detailed VM advising we have not seen her since 2020 in which she would be considered a new patient. Provided office number for her to call back as she likely will need to complete an intake prior to scheduling.

## 2023-09-08 NOTE — TELEPHONE ENCOUNTER
----- Message from Gelajacqueline Schultz sent at 9/8/2023  3:32 PM EDT -----  Regarding: RECENT CT  PT is supposed to get yearly CTA for f/u with Dr Chantel Pfeiffer. She went to ED and wants to know if this CT is sufficient for her yearly.

## 2023-10-30 ENCOUNTER — TELEPHONE (OUTPATIENT)
Dept: INTERNAL MEDICINE CLINIC | Facility: CLINIC | Age: 73
End: 2023-10-30

## 2023-12-27 NOTE — PROGRESS NOTES
Assessment/Plan:    No problem-specific Assessment & Plan notes found for this encounter  Diagnoses and all orders for this visit:    Traumatic injury of head, subsequent encounter        Emergency room record reviewed  CT scan was negative and she is asymptomatic  No signs or symptoms of concussion  Watch for any irritation of the laceration  Be very careful to avoid falls  Discussed with her avoiding falls  Watch for any signs or symptoms of concussion although it is unlikely at this point  Will have her follow up as previously scheduled or sooner if needed  Subjective:      Patient ID: Devonte Mac is a 66 y o  female  She presents for follow-up after recent emergency room visit  She had been taking a vacuum  down the steps and had missed the last 2 steps  Shi Ludwig and struck the left side of her head against the metal radiator  Had some significant bleeding  Had no loss of consciousness  No dizziness  She had thought that she needed stitches and had gone to the emergency room  Testing was negative including a CT scan  Has not had any dizziness or lightheadedness  Denies any weakness or numbness  Denies any vision changes  Denies any nausea, vomiting, or diarrhea  Her children are very over protective of for now and do not want her going up in down steps carrying anything  The following portions of the patient's history were reviewed and updated as appropriate:   She  has a past medical history of Arthritis, Carpal tunnel syndrome, Cataract, Cerebral aneurysm, Decreased body height, Heart murmur, Hypercholesteremia, Hypertension, Motion sickness, Obesity, Periodic heart flutter, Pneumonia, PONV (postoperative nausea and vomiting), Rosacea, Vertigo, Wears glasses, and Wears partial dentures    She   Patient Active Problem List    Diagnosis Date Noted    Head trauma 04/01/2021    Premature ventricular contractions (PVCs) (VPCs) 09/23/2020    Right flank pain Left message to call back.   2020    Chronic left flank pain 2020    Sciatica of right side 2019    Elevated TSH 2019    Anxiety 2019    Abnormal mammogram 2018    Hyperhidrosis 2018    Grieving 2018    Abnormal electromyography 2018    Closed fracture of multiple ribs of left side with routine healing 2018    Fall 2018    Cerebral aneurysm 2018    Aortic stenosis, mild 2018    Complex endometrial hyperplasia 2017    Thickened endometrium 2017    Female pelvic pain 2017    Primary osteoarthritis of right knee 2016    Carpal tunnel syndrome 2016    Tendonitis of left hip 2015    Vitamin D deficiency 2015    Type 2 diabetes mellitus without complication, without long-term current use of insulin ([facility] [address] ) 2015    Dyslipidemia 2014    Osteopenia 2014    Accelerated essential hypertension 2013    Class 2 severe obesity due to excess calories with serious comorbidity and body mass index (BMI) of 36 0 to 36 9 in adult [facility]) 2013    Fatigue 2012    Asymptomatic varicose veins 2012     She  has a past surgical history that includes Colonoscopy;  section; pr revise median n/carpal tunnel surg (Left, 2016); Carpal tunnel release (Bilateral); pr laparoscopy w tot hysterectuterus <=250 gram  w tube/ovary (N/A, 2017); IR cerebral angiography (10/12/2018); and IR biopsy lymph node (2018)  Her family history includes Arthritis in her mother; Colon cancer (age of onset: 78) in her sister; Diabetes in her father; Hodgkin's lymphoma (age of onset: 24) in her child; Hyperlipidemia in her sister; Hypertension in her mother; Stroke in her mother; Sudden death in her brother and father  She  reports that she has never smoked  She has never used smokeless tobacco  She reports that she does not drink alcohol or use drugs    Current Outpatient Medications Medication Sig Dispense Refill    ATORVASTATIN CALCIUM PO Take 1 tablet by mouth 3 (three) times a week      Coenzyme Q10 (COQ-10) 100 MG CAPS Take by mouth daily      doxycycline hyclate (VIBRAMYCIN) 100 mg capsule Take 100 mg by mouth every 12 (twelve) hours      losartan (COZAAR) 50 mg tablet Take 1 tablet (50 mg total) by mouth daily 90 tablet 1    metroNIDAZOLE (METROGEL) 0 75 % gel        No current facility-administered medications for this visit  Current Outpatient Medications on File Prior to Visit   Medication Sig    ATORVASTATIN CALCIUM PO Take 1 tablet by mouth 3 (three) times a week    Coenzyme Q10 (COQ-10) 100 MG CAPS Take by mouth daily    doxycycline hyclate (VIBRAMYCIN) 100 mg capsule Take 100 mg by mouth every 12 (twelve) hours    losartan (COZAAR) 50 mg tablet Take 1 tablet (50 mg total) by mouth daily    metroNIDAZOLE (METROGEL) 0 75 % gel      No current facility-administered medications on file prior to visit  She is allergic to penicillins       Review of Systems   Constitutional: Negative for activity change, appetite change and fatigue  Eyes: Negative for visual disturbance  Respiratory: Negative for chest tightness and shortness of breath  Gastrointestinal: Negative for nausea and vomiting  Musculoskeletal: Positive for arthralgias  Negative for gait problem, neck pain and neck stiffness  Skin: Positive for wound  Neurological: Negative for dizziness, speech difficulty, weakness, light-headedness and headaches  Psychiatric/Behavioral: Negative for confusion, decreased concentration and dysphoric mood  Objective:      /78 (BP Location: Left arm)   Pulse 75   Temp 97 5 °F (36 4 °C) (Temporal)   Ht 5' 3" (1 6 m)   Wt 94 1 kg (207 lb 8 oz)   LMP  (LMP Unknown)   SpO2 98%   BMI 36 76 kg/m²          Physical Exam  Vitals signs and nursing note reviewed  Constitutional:       Appearance: She is well-developed and well-groomed     HENT: Head: Normocephalic  Comments:   Small laceration left scalp with minimal tenderness  Eyes:      General: Lids are normal       Extraocular Movements: Extraocular movements intact  Neck:      Musculoskeletal: No spinous process tenderness or muscular tenderness  Cardiovascular:      Rate and Rhythm: Normal rate and regular rhythm  Heart sounds: No murmur  Pulmonary:      Effort: No tachypnea or respiratory distress  Breath sounds: No decreased breath sounds, wheezing or rhonchi  Neurological:      Mental Status: She is alert  Psychiatric:         Mood and Affect: Mood and affect normal          Speech: Speech normal          Behavior: Behavior is cooperative

## 2024-02-08 ENCOUNTER — OFFICE VISIT (OUTPATIENT)
Dept: INTERNAL MEDICINE CLINIC | Facility: CLINIC | Age: 74
End: 2024-02-08
Payer: MEDICARE

## 2024-02-08 DIAGNOSIS — N39.46 MIXED INCONTINENCE: ICD-10-CM

## 2024-02-08 DIAGNOSIS — I10 PRIMARY HYPERTENSION: ICD-10-CM

## 2024-02-08 DIAGNOSIS — R10.9 RIGHT FLANK PAIN: Primary | ICD-10-CM

## 2024-02-08 DIAGNOSIS — I67.1 CEREBRAL ANEURYSM: ICD-10-CM

## 2024-02-08 DIAGNOSIS — E11.9 TYPE 2 DIABETES MELLITUS WITHOUT COMPLICATION, WITHOUT LONG-TERM CURRENT USE OF INSULIN (HCC): ICD-10-CM

## 2024-02-08 PROCEDURE — 99213 OFFICE O/P EST LOW 20 MIN: CPT | Performed by: FAMILY MEDICINE

## 2024-02-08 RX ORDER — OXYBUTYNIN CHLORIDE 5 MG/1
5 TABLET, EXTENDED RELEASE ORAL DAILY
Qty: 30 TABLET | Refills: 0 | Status: SHIPPED | OUTPATIENT
Start: 2024-02-08

## 2024-02-09 NOTE — ASSESSMENT & PLAN NOTE
Discussed possible causes with her.  Currently asymptomatic.  Discussed the possibility of gallbladder issues.  Slip given for laboratory testing as well as an ultrasound.

## 2024-02-09 NOTE — ASSESSMENT & PLAN NOTE
Lab Results   Component Value Date    HGBA1C 5.8 (H) 02/25/2023     Well overdue for laboratory testing.  Slip provided for this.  Will have her set up a routine follow-up appointment as well as Medicare wellness.  Watch carbohydrate intake.  Follow-up with ophthalmology regularly.  Check feet daily.

## 2024-02-09 NOTE — PROGRESS NOTES
Assessment/Plan:    Type 2 diabetes mellitus without complication, without long-term current use of insulin (HCC)    Lab Results   Component Value Date    HGBA1C 5.8 (H) 02/25/2023     Well overdue for laboratory testing.  Slip provided for this.  Will have her set up a routine follow-up appointment as well as Medicare wellness.  Watch carbohydrate intake.  Follow-up with ophthalmology regularly.  Check feet daily.    Cerebral aneurysm  Asymptomatic.  Slip given for her follow-up CTA.  Recommended following with neurosurgery.    Right flank pain  Discussed possible causes with her.  Currently asymptomatic.  Discussed the possibility of gallbladder issues.  Slip given for laboratory testing as well as an ultrasound.       Diagnoses and all orders for this visit:    Right flank pain  -     CBC and differential; Future  -     TSH, 3rd generation; Future  -     US abdomen complete; Future    Mixed incontinence  -     oxybutynin (Ditropan XL) 5 mg 24 hr tablet; Take 1 tablet (5 mg total) by mouth daily  -     CBC and differential; Future  -     TSH, 3rd generation; Future  -     Urine culture; Future  -     UA (URINE) with reflex to Scope; Future    Cerebral aneurysm  -     CBC and differential; Future  -     TSH, 3rd generation; Future  -     CTA head w wo contrast; Future    Primary hypertension  -     CBC and differential; Future  -     Comprehensive metabolic panel; Future  -     Lipid panel; Future  -     TSH, 3rd generation; Future    Type 2 diabetes mellitus without complication, without long-term current use of insulin (HCC)  -     CBC and differential; Future  -     Comprehensive metabolic panel; Future  -     Hemoglobin A1C; Future  -     Lipid panel; Future  -     TSH, 3rd generation; Future        Orders and recommendations as noted above.  Will have her follow-up after the testing is completed for recheck as well as for her routine visit and Medicare wellness.    Subjective:      Patient ID: Delmi A  Kasia is a 73 y.o. female.    She presents for problem visit.  Over the last few weeks has developed some intermittent right flank pain and radiates into the right upper abdomen.  Can occur at rest or with activity.  Can be very sharp.  Does not seem to be associated with eating.  Denies any nausea, vomiting, or diarrhea.  Denies any changes in bowel movements.  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  Has not had laboratory testing completed for almost a year.  Still trying to adjust to the death of her  and her son over the last few years.        The following portions of the patient's history were reviewed and updated as appropriate: She  has a past medical history of Arthritis, Carpal tunnel syndrome, Cataract, Cerebral aneurysm, Decreased body height, Heart murmur, Hypercholesteremia, Hypertension, Motion sickness, Obesity, Periodic heart flutter, Pneumonia, PONV (postoperative nausea and vomiting), Rosacea, Vertigo, Wears glasses, and Wears partial dentures.  She   Patient Active Problem List    Diagnosis Date Noted   • Right lateral abdominal pain 02/08/2024   • Depression with anxiety 06/21/2023   • Nocturia 07/18/2022   • Primary insomnia 07/18/2022   • Head trauma 04/01/2021   • Premature ventricular contractions (PVCs) (VPCs) 09/23/2020   • Right flank pain 02/27/2020   • Chronic left flank pain 02/27/2020   • Sciatica of right side 12/11/2019   • Elevated TSH 08/12/2019   • Anxiety 01/08/2019   • Abnormal mammogram 12/07/2018   • Hyperhidrosis 09/06/2018   • Grieving 09/06/2018   • Abnormal electromyography 07/17/2018   • Closed fracture of multiple ribs of left side with routine healing 07/04/2018   • Fall 07/04/2018   • Cerebral aneurysm 07/04/2018   • Aortic stenosis, mild 03/19/2018   • Complex endometrial hyperplasia 06/23/2017   • Thickened endometrium 06/01/2017   • Female pelvic pain 04/03/2017   • Primary osteoarthritis of right knee 05/31/2016    • Carpal tunnel syndrome 2016   • Tendonitis of left hip 2015   • Vitamin D deficiency 2015   • Type 2 diabetes mellitus without complication, without long-term current use of insulin (HCC) 2015   • Dyslipidemia 2014   • Osteopenia 2014   • Accelerated essential hypertension 2013   • Fatigue 2012   • Asymptomatic varicose veins 2012     She  has a past surgical history that includes Colonoscopy;  section; pr neuroplasty &/transpos median nrv carpal tunne (Left, 2016); Carpal tunnel release (Bilateral); pr laps total hysterect 250 gm/< w/rmvl tube/ovary (N/A, 2017); IR cerebral angiography (10/12/2018); and IR biopsy lymph node (2018).  Her family history includes Arthritis in her mother; Colon cancer (age of onset: 79) in her sister; Diabetes in her father; Hodgkin's lymphoma (age of onset: 21) in her daughter; Hyperlipidemia in her sister; Hypertension in her mother; No Known Problems in her daughter, daughter, daughter, maternal aunt, paternal aunt, paternal aunt, son, and son; Stroke in her mother; Sudden death in her brother and father.  She  reports that she has never smoked. She has never used smokeless tobacco. She reports that she does not drink alcohol and does not use drugs.  Current Outpatient Medications   Medication Sig Dispense Refill   • oxybutynin (Ditropan XL) 5 mg 24 hr tablet Take 1 tablet (5 mg total) by mouth daily 30 tablet 0   • CALCIUM CARBONATE-VITAMIN D PO Take 1 tablet by mouth daily     • DULoxetine (CYMBALTA) 30 mg delayed release capsule Take 1 capsule (30 mg total) by mouth daily 90 capsule 3   • losartan (COZAAR) 50 mg tablet Take 1 tablet (50 mg total) by mouth daily 90 tablet 1   • mirtazapine (REMERON) 15 mg tablet Take 1 tablet (15 mg total) by mouth daily at bedtime as needed (insomnia) 90 tablet 3     No current facility-administered medications for this visit.     Current Outpatient Medications on  File Prior to Visit   Medication Sig   • CALCIUM CARBONATE-VITAMIN D PO Take 1 tablet by mouth daily   • DULoxetine (CYMBALTA) 30 mg delayed release capsule Take 1 capsule (30 mg total) by mouth daily   • losartan (COZAAR) 50 mg tablet Take 1 tablet (50 mg total) by mouth daily   • mirtazapine (REMERON) 15 mg tablet Take 1 tablet (15 mg total) by mouth daily at bedtime as needed (insomnia)     No current facility-administered medications on file prior to visit.     She is allergic to penicillins..    Review of Systems   Constitutional:  Positive for activity change, appetite change and fatigue. Negative for chills and fever.   HENT:  Negative for congestion and rhinorrhea.    Eyes:  Negative for visual disturbance.   Respiratory:  Negative for cough, chest tightness and shortness of breath.    Cardiovascular:  Negative for chest pain and palpitations.   Gastrointestinal:  Positive for abdominal pain. Negative for blood in stool, diarrhea, nausea and vomiting.   Endocrine: Negative for polydipsia, polyphagia and polyuria.   Genitourinary:  Positive for flank pain. Negative for dysuria, frequency and urgency.   Musculoskeletal:  Positive for arthralgias. Negative for gait problem.   Skin:  Negative for color change.   Neurological:  Negative for dizziness and headaches.   Hematological:  Does not bruise/bleed easily.   Psychiatric/Behavioral:  Positive for dysphoric mood. Negative for confusion and sleep disturbance. The patient is not nervous/anxious.          Objective:      LMP  (LMP Unknown)          Physical Exam  Vitals and nursing note reviewed.   Constitutional:       General: She is not in acute distress.     Appearance: She is well-developed and well-groomed.   HENT:      Head: Normocephalic and atraumatic.   Eyes:      General:         Right eye: No discharge.         Left eye: No discharge.      Conjunctiva/sclera: Conjunctivae normal.      Pupils: Pupils are equal, round, and reactive to light.    Cardiovascular:      Rate and Rhythm: Normal rate and regular rhythm.      Heart sounds: Murmur heard.      Systolic murmur is present with a grade of 1/6.      No friction rub. No gallop.   Pulmonary:      Effort: No respiratory distress.      Breath sounds: No wheezing or rales.   Abdominal:      General: Bowel sounds are normal. There is no distension.      Tenderness: There is abdominal tenderness.      Comments: Slight right upper abdomen tenderness into the flank   Lymphadenopathy:      Cervical: No cervical adenopathy.   Skin:     General: Skin is warm and dry.   Neurological:      Mental Status: She is alert and oriented to person, place, and time.   Psychiatric:         Mood and Affect: Mood and affect normal.         Behavior: Behavior normal. Behavior is cooperative.

## 2024-03-05 ENCOUNTER — APPOINTMENT (OUTPATIENT)
Dept: LAB | Facility: CLINIC | Age: 74
End: 2024-03-05
Payer: MEDICARE

## 2024-03-05 DIAGNOSIS — R10.9 RIGHT FLANK PAIN: ICD-10-CM

## 2024-03-05 DIAGNOSIS — I10 PRIMARY HYPERTENSION: ICD-10-CM

## 2024-03-05 DIAGNOSIS — I67.1 CEREBRAL ANEURYSM: ICD-10-CM

## 2024-03-05 DIAGNOSIS — N39.46 MIXED INCONTINENCE: ICD-10-CM

## 2024-03-05 DIAGNOSIS — E11.9 TYPE 2 DIABETES MELLITUS WITHOUT COMPLICATION, WITHOUT LONG-TERM CURRENT USE OF INSULIN (HCC): ICD-10-CM

## 2024-03-05 LAB
ALBUMIN SERPL BCP-MCNC: 3.9 G/DL (ref 3.5–5)
ALP SERPL-CCNC: 85 U/L (ref 34–104)
ALT SERPL W P-5'-P-CCNC: 10 U/L (ref 7–52)
ANION GAP SERPL CALCULATED.3IONS-SCNC: 6 MMOL/L
AST SERPL W P-5'-P-CCNC: 17 U/L (ref 13–39)
BACTERIA UR QL AUTO: ABNORMAL /HPF
BASOPHILS # BLD AUTO: 0.03 THOUSANDS/ÂΜL (ref 0–0.1)
BASOPHILS NFR BLD AUTO: 1 % (ref 0–1)
BILIRUB SERPL-MCNC: 0.33 MG/DL (ref 0.2–1)
BILIRUB UR QL STRIP: NEGATIVE
BUN SERPL-MCNC: 17 MG/DL (ref 5–25)
CALCIUM SERPL-MCNC: 9.8 MG/DL (ref 8.4–10.2)
CHLORIDE SERPL-SCNC: 102 MMOL/L (ref 96–108)
CHOLEST SERPL-MCNC: 271 MG/DL
CLARITY UR: ABNORMAL
CO2 SERPL-SCNC: 31 MMOL/L (ref 21–32)
COLOR UR: ABNORMAL
CREAT SERPL-MCNC: 0.92 MG/DL (ref 0.6–1.3)
EOSINOPHIL # BLD AUTO: 0.1 THOUSAND/ÂΜL (ref 0–0.61)
EOSINOPHIL NFR BLD AUTO: 2 % (ref 0–6)
ERYTHROCYTE [DISTWIDTH] IN BLOOD BY AUTOMATED COUNT: 13.5 % (ref 11.6–15.1)
EST. AVERAGE GLUCOSE BLD GHB EST-MCNC: 126 MG/DL
GFR SERPL CREATININE-BSD FRML MDRD: 61 ML/MIN/1.73SQ M
GLUCOSE P FAST SERPL-MCNC: 101 MG/DL (ref 65–99)
GLUCOSE UR STRIP-MCNC: NEGATIVE MG/DL
HBA1C MFR BLD: 6 %
HCT VFR BLD AUTO: 39 % (ref 34.8–46.1)
HDLC SERPL-MCNC: 58 MG/DL
HGB BLD-MCNC: 12.6 G/DL (ref 11.5–15.4)
HGB UR QL STRIP.AUTO: NEGATIVE
IMM GRANULOCYTES # BLD AUTO: 0.01 THOUSAND/UL (ref 0–0.2)
IMM GRANULOCYTES NFR BLD AUTO: 0 % (ref 0–2)
KETONES UR STRIP-MCNC: NEGATIVE MG/DL
LDLC SERPL CALC-MCNC: 188 MG/DL (ref 0–100)
LEUKOCYTE ESTERASE UR QL STRIP: ABNORMAL
LYMPHOCYTES # BLD AUTO: 1.55 THOUSANDS/ÂΜL (ref 0.6–4.47)
LYMPHOCYTES NFR BLD AUTO: 27 % (ref 14–44)
MCH RBC QN AUTO: 30 PG (ref 26.8–34.3)
MCHC RBC AUTO-ENTMCNC: 32.3 G/DL (ref 31.4–37.4)
MCV RBC AUTO: 93 FL (ref 82–98)
MONOCYTES # BLD AUTO: 0.32 THOUSAND/ÂΜL (ref 0.17–1.22)
MONOCYTES NFR BLD AUTO: 6 % (ref 4–12)
MUCOUS THREADS UR QL AUTO: ABNORMAL
NEUTROPHILS # BLD AUTO: 3.81 THOUSANDS/ÂΜL (ref 1.85–7.62)
NEUTS SEG NFR BLD AUTO: 64 % (ref 43–75)
NITRITE UR QL STRIP: NEGATIVE
NON-SQ EPI CELLS URNS QL MICRO: ABNORMAL /HPF
NONHDLC SERPL-MCNC: 213 MG/DL
NRBC BLD AUTO-RTO: 0 /100 WBCS
PH UR STRIP.AUTO: 6 [PH]
PLATELET # BLD AUTO: 279 THOUSANDS/UL (ref 149–390)
PMV BLD AUTO: 10.4 FL (ref 8.9–12.7)
POTASSIUM SERPL-SCNC: 4.4 MMOL/L (ref 3.5–5.3)
PROT SERPL-MCNC: 6.9 G/DL (ref 6.4–8.4)
PROT UR STRIP-MCNC: ABNORMAL MG/DL
RBC # BLD AUTO: 4.2 MILLION/UL (ref 3.81–5.12)
RBC #/AREA URNS AUTO: ABNORMAL /HPF
SODIUM SERPL-SCNC: 139 MMOL/L (ref 135–147)
SP GR UR STRIP.AUTO: 1.02 (ref 1–1.03)
TRIGL SERPL-MCNC: 126 MG/DL
TSH SERPL DL<=0.05 MIU/L-ACNC: 2.16 UIU/ML (ref 0.45–4.5)
UROBILINOGEN UR STRIP-ACNC: <2 MG/DL
WBC # BLD AUTO: 5.82 THOUSAND/UL (ref 4.31–10.16)
WBC #/AREA URNS AUTO: ABNORMAL /HPF

## 2024-03-05 PROCEDURE — 80061 LIPID PANEL: CPT

## 2024-03-05 PROCEDURE — 84443 ASSAY THYROID STIM HORMONE: CPT

## 2024-03-05 PROCEDURE — 80053 COMPREHEN METABOLIC PANEL: CPT

## 2024-03-05 PROCEDURE — 81001 URINALYSIS AUTO W/SCOPE: CPT

## 2024-03-05 PROCEDURE — 83036 HEMOGLOBIN GLYCOSYLATED A1C: CPT

## 2024-03-05 PROCEDURE — 85025 COMPLETE CBC W/AUTO DIFF WBC: CPT

## 2024-03-05 PROCEDURE — 36415 COLL VENOUS BLD VENIPUNCTURE: CPT

## 2024-03-06 LAB — BACTERIA UR CULT: NORMAL

## 2024-03-08 ENCOUNTER — HOSPITAL ENCOUNTER (OUTPATIENT)
Dept: CT IMAGING | Facility: HOSPITAL | Age: 74
Discharge: HOME | End: 2024-03-08
Payer: MEDICARE

## 2024-03-08 ENCOUNTER — HOSPITAL ENCOUNTER (OUTPATIENT)
Dept: ULTRASOUND IMAGING | Facility: HOSPITAL | Age: 74
Discharge: HOME | End: 2024-03-08
Payer: MEDICARE

## 2024-03-08 DIAGNOSIS — R10.9 RIGHT FLANK PAIN: ICD-10-CM

## 2024-03-08 DIAGNOSIS — I67.1 CEREBRAL ANEURYSM: ICD-10-CM

## 2024-03-08 PROCEDURE — 76700 US EXAM ABDOM COMPLETE: CPT

## 2024-03-08 PROCEDURE — G1004 CDSM NDSC: HCPCS

## 2024-03-08 PROCEDURE — 70496 CT ANGIOGRAPHY HEAD: CPT

## 2024-03-08 RX ADMIN — IOHEXOL 85 ML: 350 INJECTION, SOLUTION INTRAVENOUS at 08:51

## 2024-03-10 ENCOUNTER — RA CDI HCC (OUTPATIENT)
Dept: OTHER | Facility: HOSPITAL | Age: 74
End: 2024-03-10

## 2024-03-10 PROBLEM — S09.90XA HEAD TRAUMA: Status: RESOLVED | Noted: 2021-04-01 | Resolved: 2024-03-10

## 2024-03-10 PROBLEM — F43.21 GRIEVING: Status: RESOLVED | Noted: 2018-09-06 | Resolved: 2024-03-10
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
## 2024-03-10 NOTE — PROGRESS NOTES
HCC coding opportunities          Chart Reviewed number of suggestions sent to Provider: 1     Patients Insurance     Medicare Insurance: Medicare        E66.01

## 2024-03-18 ENCOUNTER — OFFICE VISIT (OUTPATIENT)
Dept: INTERNAL MEDICINE CLINIC | Facility: CLINIC | Age: 74
End: 2024-03-18
Payer: MEDICARE

## 2024-03-18 VITALS
TEMPERATURE: 97.9 F | BODY MASS INDEX: 33.96 KG/M2 | SYSTOLIC BLOOD PRESSURE: 162 MMHG | OXYGEN SATURATION: 100 % | DIASTOLIC BLOOD PRESSURE: 88 MMHG | HEIGHT: 63 IN | WEIGHT: 191.7 LBS | HEART RATE: 67 BPM

## 2024-03-18 DIAGNOSIS — I63.81 MULTIPLE LACUNAR INFARCTS (HCC): ICD-10-CM

## 2024-03-18 DIAGNOSIS — M17.11 PRIMARY OSTEOARTHRITIS OF RIGHT KNEE: ICD-10-CM

## 2024-03-18 DIAGNOSIS — F51.01 PRIMARY INSOMNIA: ICD-10-CM

## 2024-03-18 DIAGNOSIS — I10 HYPERTENSION: ICD-10-CM

## 2024-03-18 DIAGNOSIS — F41.9 ANXIETY: ICD-10-CM

## 2024-03-18 DIAGNOSIS — E55.9 VITAMIN D DEFICIENCY: ICD-10-CM

## 2024-03-18 DIAGNOSIS — I67.1 CEREBRAL ANEURYSM: ICD-10-CM

## 2024-03-18 DIAGNOSIS — E78.5 DYSLIPIDEMIA: ICD-10-CM

## 2024-03-18 DIAGNOSIS — I10 ACCELERATED ESSENTIAL HYPERTENSION: Primary | ICD-10-CM

## 2024-03-18 DIAGNOSIS — I35.0 AORTIC STENOSIS, MILD: ICD-10-CM

## 2024-03-18 DIAGNOSIS — E11.9 TYPE 2 DIABETES MELLITUS WITHOUT COMPLICATION, WITHOUT LONG-TERM CURRENT USE OF INSULIN (HCC): ICD-10-CM

## 2024-03-18 PROCEDURE — G0438 PPPS, INITIAL VISIT: HCPCS | Performed by: FAMILY MEDICINE

## 2024-03-18 PROCEDURE — 99214 OFFICE O/P EST MOD 30 MIN: CPT | Performed by: FAMILY MEDICINE

## 2024-03-18 RX ORDER — ROSUVASTATIN CALCIUM 5 MG/1
5 TABLET, COATED ORAL 3 TIMES WEEKLY
Qty: 36 TABLET | Refills: 3 | Status: SHIPPED | OUTPATIENT
Start: 2024-03-18

## 2024-03-18 RX ORDER — LOSARTAN POTASSIUM 100 MG/1
100 TABLET ORAL DAILY
Qty: 90 TABLET | Refills: 1 | Status: SHIPPED | OUTPATIENT
Start: 2024-03-18

## 2024-03-18 NOTE — PROGRESS NOTES
Assessment and Plan:     1. Accelerated essential hypertension  Assessment & Plan:  Blood pressure elevated and has been persistently so.  Discussed with her increasing the dose of the losartan.  This is important especially with the known aneurysm and the CT scan which did show some old lacunar infarcts.  Will increase the dose of losartan to 100 mg daily.  Follow blood pressure at home if possible.  Watch salt intake.  Stay adequately hydrated.    Orders:  -     CBC and differential; Future  -     Comprehensive metabolic panel; Future    2. Aortic stenosis, mild  Assessment & Plan:  Due for follow-up echocardiogram.  Is well overdue.  Slip given for this.  Consider following up with cardiology.    Orders:  -     CBC and differential; Future  -     Echo complete w/ contrast if indicated; Future; Expected date: 03/18/2024    3. Hypertension  -     losartan (COZAAR) 100 MG tablet; Take 1 tablet (100 mg total) by mouth daily  -     CBC and differential; Future    4. Cerebral aneurysm  Assessment & Plan:  Reviewed recent CT scan.  Recommended following up with neurosurgery.  Aneurysm is stable.  Very good control of blood pressure stressed.    Orders:  -     CBC and differential; Future    5. Multiple lacunar infarcts (HCC)  Assessment & Plan:  Previous CT scan reviewed with her.  She was unaware that she had had previous lacunar infarcts.  Discussed this with her.  Discussed that the symptoms of these can be subtle.  Discussed risk factor modification.  Good blood pressure control discussed.  Discussed starting on a statin medication and she is willing to do so.    Orders:  -     CBC and differential; Future    6. Primary osteoarthritis of right knee  Assessment & Plan:  The joint symptoms are worsening.  Discussed with her considering following up with orthopedics.    Orders:  -     CBC and differential; Future    7. Primary insomnia  Assessment & Plan:  Continue with Remeron.  May need to increase dosage if this is  inadequate.    Orders:  -     CBC and differential; Future    8. Anxiety  Assessment & Plan:  Anxiety symptoms especially with recent financial stressors discussed.  Methods for stress relief discussed.  Continue with the Cymbalta.  Sleep issues discussed.  Continue with the Remeron.    Orders:  -     CBC and differential; Future    9. Vitamin D deficiency  -     CBC and differential; Future  -     Vitamin D 25 hydroxy; Future    10. Dyslipidemia  Assessment & Plan:  Cholesterol is significantly elevated.  Discussed the risk that this poses especially with the CT scan showing a lacunar infarcts.  Discussed starting a statin medication.  She has been intolerant to this in the past.  Will start slowly and at a very low dose.  Will start with the Crestor initially 2 days a week and then increase to 3 days a week if tolerates.    Orders:  -     rosuvastatin (CRESTOR) 5 mg tablet; Take 1 tablet (5 mg total) by mouth 3 (three) times a week  -     CBC and differential; Future  -     Comprehensive metabolic panel; Future  -     Lipid panel; Future  -     TSH, 3rd generation; Future    11. Type 2 diabetes mellitus without complication, without long-term current use of insulin (HCC)  Assessment & Plan:    Lab Results   Component Value Date    HGBA1C 6.0 (H) 03/05/2024     Recent laboratory testing reviewed with her.  Hemoglobin A1c well-controlled.  Continue to watch carbohydrate intake.  Try to remain as active as possible.  Follow-up with ophthalmology regularly.  Check feet daily.    Orders:  -     Hemoglobin A1C; Future  -     Albumin / creatinine urine ratio    Orders and recommendations as noted above.  Recent stressors discussed.  Discussed current medications.  Follow blood pressure at home if possible.  Recommend mammograms yearly.  Recommend bone densities every 2 years.  Will have her follow-up in about 4 to 6 months with laboratory testing prior to that visit.  Follow-up sooner if needed.      Depression Screening  and Follow-up Plan: Patient was screened for depression during today's encounter. They screened negative with a PHQ-9 score of 1.         History of Present Illness:     She presents for routine follow-up as well as Medicare wellness visit.  Continues to spend most of her time at her daughter's helping to care for her granddaughter.  She does come home but not often.  She has been under more stress because of some financial issues regarding an old loan.  This has affected her sleep.  She has trouble keeping it out of her mind.  Her mood is variable at times.  Continues with the Cymbalta.  Never started with the Ditropan for the urinary issues.  Still does urinate frequently and does get up at night.  Does not want to take any additional medications at this point.  She has been taking her losartan.  Does need a refill for this.  Denies any headaches or localized weakness.  She did have the recent CT scan to follow-up on the known aneurysm and this was stable.  She did not realize that she had any previous lacunar infarcts but these did show up on the recent CT scan.  Has had issues with cholesterol medication in the past with significant achiness into the muscles.  She is willing, however, to try this again at a low dose to see if she can tolerate it.  Having increasing issues with her joints especially the knees.  Is considering following up with orthopedics for this.    Patient Care Team:  Edelmira Noe MD as PCP - General  MD Sherry Del Rio MD Lynn Moran, DO Daniel Eyvazzadeh, MD Michael G Jusinski, MD   Problem List:     Patient Active Problem List   Diagnosis   • Carpal tunnel syndrome   • Accelerated essential hypertension   • Dyslipidemia   • Aortic stenosis, mild   • Complex endometrial hyperplasia   • Fatigue   • Female pelvic pain   • Type 2 diabetes mellitus without complication, without long-term current use of insulin (HCC)   • Osteopenia   • Primary osteoarthritis of right  knee   • Tendonitis of left hip   • Thickened endometrium   • Asymptomatic varicose veins   • Vitamin D deficiency   • Closed fracture of multiple ribs of left side with routine healing   • Cerebral aneurysm   • Abnormal electromyography   • Hyperhidrosis   • Abnormal mammogram   • Anxiety   • Elevated TSH   • Sciatica of right side   • Right flank pain   • Chronic left flank pain   • Premature ventricular contractions (PVCs) (VPCs)   • Nocturia   • Primary insomnia   • Depression with anxiety   • Right lateral abdominal pain   • Multiple lacunar infarcts (HCC)      Past Medical and Surgical History:     Past Medical History:   Diagnosis Date   • Arthritis    • Carpal tunnel syndrome     unspecified laterality   • Cataract    • Cerebral aneurysm    • Decreased body height     last assessed 3/17/14   • Head trauma 2021   • Heart murmur    • Hypercholesteremia    • Hypertension    • Motion sickness    • Obesity    • Periodic heart flutter    • Pneumonia     x1   • PONV (postoperative nausea and vomiting)    • Rosacea    • Vertigo    • Wears glasses    • Wears partial dentures     upper     Past Surgical History:   Procedure Laterality Date   • CARPAL TUNNEL RELEASE Bilateral    •  SECTION      x1   • COLONOSCOPY     • IR BIOPSY LYMPH NODE  2018   • IR CEREBRAL ANGIOGRAPHY  10/12/2018   • WA LAPS TOTAL HYSTERECT 250 GM/< W/RMVL TUBE/OVARY N/A 2017    Procedure: HYSTERECTOMY LAPAROSCOPIC TOTAL, BSO, Cystoscopy;  Surgeon: Naida Pablo MD;  Location: [facility];  Service: Gynecology   • WA NEUROPLASTY &/TRANSPOS MEDIAN NRV CARPAL TUNNE Left 2016    Procedure: RELEASE CARPAL TUNNEL;  Surgeon: Jesus Posada MD;  Location: [facility];  Service: Orthopedics      Family History:     Family History   Problem Relation Age of Onset   • Arthritis Mother    • Hypertension Mother    • Stroke Mother         syndrome   • Diabetes Father    • Sudden death Father         instantaneous cardiac   •  Hyperlipidemia Sister    • Colon cancer Sister 79   • Hodgkin's lymphoma Daughter 21   • No Known Problems Daughter    • No Known Problems Daughter    • No Known Problems Daughter    • Sudden death Brother         instananeous cardiac   • No Known Problems Son    • No Known Problems Son    • No Known Problems Maternal Aunt    • No Known Problems Paternal Aunt    • No Known Problems Paternal Aunt       Social History:     Social History     Socioeconomic History   • Marital status:      Spouse name: None   • Number of children: None   • Years of education: None   • Highest education level: None   Occupational History   • None   Tobacco Use   • Smoking status: Never   • Smokeless tobacco: Never   Vaping Use   • Vaping status: Never Used   Substance and Sexual Activity   • Alcohol use: No   • Drug use: No   • Sexual activity: None   Other Topics Concern   • None   Social History Narrative    Stress at work     Social Determinants of Health     Financial Resource Strain: Not on file   Food Insecurity: No Food Insecurity (3/18/2024)    Hunger Vital Sign    • Worried About Running Out of Food in the Last Year: Never true    • Ran Out of Food in the Last Year: Never true   Transportation Needs: No Transportation Needs (3/18/2024)    PRAPARE - Transportation    • Lack of Transportation (Medical): No    • Lack of Transportation (Non-Medical): No   Physical Activity: Not on file   Stress: Not on file   Social Connections: Not on file   Intimate Partner Violence: Not on file   Housing Stability: High Risk (3/18/2024)    Housing Stability Vital Sign    • Unable to Pay for Housing in the Last Year: Yes    • Number of Places Lived in the Last Year: 1    • Unstable Housing in the Last Year: No      Medications and Allergies:     Current Outpatient Medications   Medication Sig Dispense Refill   • CALCIUM CARBONATE-VITAMIN D PO Take 1 tablet by mouth daily     • DULoxetine (CYMBALTA) 30 mg delayed release capsule Take 1  capsule (30 mg total) by mouth daily 90 capsule 3   • losartan (COZAAR) 100 MG tablet Take 1 tablet (100 mg total) by mouth daily 90 tablet 1   • mirtazapine (REMERON) 15 mg tablet Take 1 tablet (15 mg total) by mouth daily at bedtime as needed (insomnia) 90 tablet 3   • rosuvastatin (CRESTOR) 5 mg tablet Take 1 tablet (5 mg total) by mouth 3 (three) times a week 36 tablet 3     No current facility-administered medications for this visit.     Allergies   Allergen Reactions   • Penicillins Hives and Rash      Immunizations:     Immunization History   Administered Date(s) Administered   • COVID-19 MODERNA VACC 0.5 ML IM 01/05/2021, 02/04/2021, 12/23/2021   • INFLUENZA 10/12/2016, 11/02/2018, 11/01/2019, 11/17/2021   • Influenza Split High Dose Preservative Free IM 10/12/2016   • Influenza, high dose seasonal 0.7 mL 11/02/2018, 09/16/2020   • Influenza, seasonal, injectable 09/12/2011, 09/01/2013   • Pneumococcal Conjugate 13-Valent 06/20/2016   • Pneumococcal Polysaccharide PPV23 10/19/2007, 11/06/2018   • Zoster 11/04/2015      Health Maintenance:         Topic Date Due   • Breast Cancer Screening: Mammogram  10/07/2024   • DXA SCAN  10/07/2024   • Hepatitis C Screening  Completed   • Colorectal Cancer Screening  Discontinued         Topic Date Due   • Influenza Vaccine (1) 09/01/2023   • COVID-19 Vaccine (4 - 2023-24 season) 09/01/2023      Review of Systems:     Review of Systems   Constitutional:  Positive for fatigue. Negative for activity change, appetite change, chills and fever.   HENT:  Negative for congestion and rhinorrhea.    Eyes:  Negative for visual disturbance.   Respiratory:  Negative for cough, chest tightness and shortness of breath.    Cardiovascular:  Negative for chest pain and palpitations.   Gastrointestinal:  Negative for abdominal pain, blood in stool, diarrhea, nausea and vomiting.   Endocrine: Negative for polydipsia, polyphagia and polyuria.   Genitourinary:  Positive for frequency.  "Negative for dysuria and urgency.   Musculoskeletal:  Positive for arthralgias and gait problem.   Skin:  Negative for color change.   Neurological:  Negative for dizziness, numbness and headaches.   Hematological:  Does not bruise/bleed easily.   Psychiatric/Behavioral:  Positive for dysphoric mood and sleep disturbance. Negative for confusion. The patient is nervous/anxious.         Physical Exam:     /88 (BP Location: Left arm, Patient Position: Sitting, Cuff Size: Large)   Pulse 67   Temp 97.9 °F (36.6 °C)   Ht 5' 3\" (1.6 m)   Wt 87 kg (191 lb 11.2 oz)   LMP  (LMP Unknown)   SpO2 100%   BMI 33.96 kg/m²     Physical Exam  Vitals and nursing note reviewed.   Constitutional:       General: She is not in acute distress.     Appearance: She is well-developed, well-groomed and overweight.   HENT:      Head: Normocephalic and atraumatic.   Eyes:      General:         Right eye: No discharge.         Left eye: No discharge.      Conjunctiva/sclera: Conjunctivae normal.      Pupils: Pupils are equal, round, and reactive to light.   Neck:      Vascular: No carotid bruit.   Cardiovascular:      Rate and Rhythm: Normal rate and regular rhythm.      Heart sounds: Murmur heard.      Systolic murmur is present with a grade of 2/6.      No friction rub. No gallop.   Pulmonary:      Effort: No respiratory distress.      Breath sounds: No wheezing or rales.   Abdominal:      General: Bowel sounds are normal. There is no distension.      Tenderness: There is no abdominal tenderness.   Musculoskeletal:      Comments: Generative changes bilateral knees   Lymphadenopathy:      Cervical: No cervical adenopathy.   Skin:     General: Skin is warm and dry.   Neurological:      Mental Status: She is alert and oriented to person, place, and time.   Psychiatric:         Mood and Affect: Mood is anxious.         Speech: Speech normal.         Behavior: Behavior normal. Behavior is cooperative.         Cognition and Memory: " Cognition and memory normal.     Below is the patient's most recent value for Albumin, ALT, AST, BUN, Calcium, Chloride, Cholesterol, CO2, Creatinine, GFR, Glucose, HDL, Hematocrit, Hemoglobin, Hemoglobin A1C, LDL, Magnesium, Phosphorus, Platelets, Potassium, PSA, Sodium, Triglycerides, and WBC.   Lab Results   Component Value Date    ALT 10 03/05/2024    AST 17 03/05/2024    BUN 17 03/05/2024    CALCIUM 9.8 03/05/2024     03/05/2024    CHOL 166 06/08/2015    CO2 31 03/05/2024    CREATININE 0.92 03/05/2024    HDL 58 03/05/2024    HCT 39.0 03/05/2024    HGB 12.6 03/05/2024    HGBA1C 6.0 (H) 03/05/2024    MG 2.0 05/07/2020    PHOS 2.9 07/04/2018     03/05/2024    K 4.4 03/05/2024     06/08/2015    TRIG 126 03/05/2024    WBC 5.82 03/05/2024     Note: for a comprehensive list of the patient's lab results, access the Results Review activity.     Medicare Screening Tests and Risk Assessments:     Delmi is here for her Subsequent Wellness visit. Last Medicare Wellness visit information reviewed, patient interviewed and updates made to the record today.      Health Risk Assessment:   Patient rates overall health as good. Patient feels that their physical health rating is same. Patient is satisfied with their life. Eyesight was rated as slightly worse. Hearing was rated as same. Patient feels that their emotional and mental health rating is same. Patients states they are never, rarely angry. Patient states they are sometimes unusually tired/fatigued. Pain experienced in the last 7 days has been none. Patient states that she has experienced no weight loss or gain in last 6 months.     Depression Screening:   PHQ-9 Score: 1      Fall Risk Screening:   In the past year, patient has experienced: no history of falling in past year      Urinary Incontinence Screening:   Patient has not leaked urine accidently in the last six months.     Home Safety:  Patient does not have trouble with stairs inside or outside  of their home. Patient has working smoke alarms and has working carbon monoxide detector. Home safety hazards include: none.     Nutrition:   Current diet is Regular.     Medications:   Patient is currently taking over-the-counter supplements. OTC medications include: see medication list. Patient is able to manage medications.     Activities of Daily Living (ADLs)/Instrumental Activities of Daily Living (IADLs):   Walk and transfer into and out of bed and chair?: Yes  Dress and groom yourself?: Yes    Bathe or shower yourself?: Yes    Feed yourself? Yes  Do your laundry/housekeeping?: Yes  Manage your money, pay your bills and track your expenses?: Yes  Make your own meals?: Yes    Do your own shopping?: Yes    Previous Hospitalizations:   Any hospitalizations or ED visits within the last 12 months?: No      Advance Care Planning:   Living will: Yes    Durable POA for healthcare: Yes    Advanced directive: Yes    Provider agrees with end of life decisions: Yes      Cognitive Screening:   Provider or family/friend/caregiver concerned regarding cognition?: No    PREVENTIVE SCREENINGS      Cardiovascular Screening:    General: Screening Current      Diabetes Screening:     General: Screening Not Indicated and History Diabetes      Colorectal Cancer Screening:     General: Patient Declines      Breast Cancer Screening:     General: Screening Current      Cervical Cancer Screening:    General: Screening Not Indicated      Osteoporosis Screening:    General: Screening Current      Abdominal Aortic Aneurysm (AAA) Screening:        General: Screening Not Indicated      Lung Cancer Screening:     General: Screening Not Indicated      Hepatitis C Screening:    General: Screening Current    Screening, Brief Intervention, and Referral to Treatment (SBIRT)    Screening  Typical number of drinks in a day: 0  Typical number of drinks in a week: 0  Interpretation: Low risk drinking behavior.    Single Item Drug Screening:  How  often have you used an illegal drug (including marijuana) or a prescription medication for non-medical reasons in the past year? never    Single Item Drug Screen Score: 0  Interpretation: Negative screen for possible drug use disorder    Brief Intervention  Alcohol & drug use screenings were reviewed. No concerns regarding substance use disorder identified.         Edelmira Noe MD

## 2024-03-19 NOTE — ASSESSMENT & PLAN NOTE
Lab Results   Component Value Date    HGBA1C 6.0 (H) 03/05/2024     Recent laboratory testing reviewed with her.  Hemoglobin A1c well-controlled.  Continue to watch carbohydrate intake.  Try to remain as active as possible.  Follow-up with ophthalmology regularly.  Check feet daily.

## 2024-03-19 NOTE — ASSESSMENT & PLAN NOTE
Cholesterol is significantly elevated.  Discussed the risk that this poses especially with the CT scan showing a lacunar infarcts.  Discussed starting a statin medication.  She has been intolerant to this in the past.  Will start slowly and at a very low dose.  Will start with the Crestor initially 2 days a week and then increase to 3 days a week if tolerates.

## 2024-03-19 NOTE — ASSESSMENT & PLAN NOTE
Previous CT scan reviewed with her.  She was unaware that she had had previous lacunar infarcts.  Discussed this with her.  Discussed that the symptoms of these can be subtle.  Discussed risk factor modification.  Good blood pressure control discussed.  Discussed starting on a statin medication and she is willing to do so.

## 2024-03-19 NOTE — ASSESSMENT & PLAN NOTE
Reviewed recent CT scan.  Recommended following up with neurosurgery.  Aneurysm is stable.  Very good control of blood pressure stressed.

## 2024-03-19 NOTE — ASSESSMENT & PLAN NOTE
Due for follow-up echocardiogram.  Is well overdue.  Slip given for this.  Consider following up with cardiology.

## 2024-03-19 NOTE — ASSESSMENT & PLAN NOTE
Blood pressure elevated and has been persistently so.  Discussed with her increasing the dose of the losartan.  This is important especially with the known aneurysm and the CT scan which did show some old lacunar infarcts.  Will increase the dose of losartan to 100 mg daily.  Follow blood pressure at home if possible.  Watch salt intake.  Stay adequately hydrated.

## 2024-03-26 ENCOUNTER — HOSPITAL ENCOUNTER (EMERGENCY)
Facility: HOSPITAL | Age: 74
Discharge: HOME | End: 2024-03-26
Attending: EMERGENCY MEDICINE
Payer: MEDICARE

## 2024-03-26 ENCOUNTER — APPOINTMENT (EMERGENCY)
Dept: RADIOLOGY | Facility: HOSPITAL | Age: 74
End: 2024-03-26
Payer: MEDICARE

## 2024-03-26 VITALS — TEMPERATURE: 98.1 F

## 2024-03-26 DIAGNOSIS — S63.502A SPRAIN OF LEFT WRIST, INITIAL ENCOUNTER: Primary | ICD-10-CM
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
## 2024-03-26 DIAGNOSIS — M25.532 ACUTE PAIN OF LEFT WRIST: ICD-10-CM

## 2024-03-26 PROCEDURE — 99284 EMERGENCY DEPT VISIT MOD MDM: CPT | Performed by: EMERGENCY MEDICINE

## 2024-03-26 PROCEDURE — 73110 X-RAY EXAM OF WRIST: CPT

## 2024-03-26 PROCEDURE — 73130 X-RAY EXAM OF HAND: CPT

## 2024-03-26 PROCEDURE — 99284 EMERGENCY DEPT VISIT MOD MDM: CPT

## 2024-03-27 NOTE — ED ATTENDING ATTESTATION
3/26/2024  IBozena MD, saw and evaluated the patient. I have discussed the patient with the resident/non-physician practitioner and agree with the resident's/non-physician practitioner's findings, Plan of Care, and MDM as documented in the resident's/non-physician practitioner's note, except where noted. All available labs and Radiology studies were reviewed.  I was present for key portions of any procedure(s) performed by the resident/non-physician practitioner and I was immediately available to provide assistance.       At this point I agree with the current assessment done in the Emergency Department.  I have conducted an independent evaluation of this patient a history and physical is as follows:    74-year-old right-hand-dominant female presenting with left wrist injury after falling on outstretched hand earlier today.  Patient did not strike her head or lose consciousness.  She developed left wrist and hand swelling and pain, worsened with movement.  No numbness or tingling.  Patient denies any other injuries.     ED Triage Vitals [03/26/24 1847]   Temperature Pulse Resp BP SpO2   98.1 °F (36.7 °C) -- -- -- --      Temp src Heart Rate Source Patient Position - Orthostatic VS BP Location FiO2 (%)   -- -- -- -- --      Pain Score       --           Constitutional:  Awake, alert, oriented.  No acute distress.  HEENT:  Normocephalic, atraumatic.  Sclera anicteric, conjunctiva not injected.  Moist oral mucosa.  Cardiac:  Appears well-perfused  Respiratory:  Breathing comfortably on room air  Abdomen:  Nondistended  Extremities: Examination of left wrist reveals edema to the dorsum of left wrist.  There is no focal tenderness over distal radius, or over anatomic snuffbox.  Patient is able to move fingers of left hand, sensation to light touch is intact in median, radial, and ulnar nerve distributions.  2+ radial pulse.  Integument:  No rashes over exposed areas, cap refill less than 2 seconds  Neurologic:   Awake, alert, and oriented x3.  Nonfocal exam.  Psychiatric:  Normal affect    XR hand 3+ views LEFT   ED Interpretation   No obvious fractures.      Final Result   Degenerative changes most pronounced at the first CMC joint      No acute osseous abnormality.      Workstation performed: [id number]         XR wrist 3+ views LEFT   ED Interpretation   No obvious fractures.      Final Result   Progressive degenerative changes first CMC joint      No acute osseous abnormality.            Workstation performed: [id number]           Medications - No data to display    74-year-old female presenting with left wrist injury after fall on outstretched hand earlier today.  Differential diagnosis includes fracture, dislocation, contusion, CPPD, versus another etiology of symptoms.  X-rays of left wrist and left hand obtained, to my interpretation no acute fracture or dislocation.  Patient does have degenerative changes consistent with arthritis.  At this time, we will place the patient in a removable wrist splint (Velcro ProCare wrist splint), and plan for follow-up with orthopedics for reevaluation.  Patient may take Tylenol as needed for pain.  Recommend rest, ice, and elevation.    Patient discharged to home with recommendations for symptom control, return precautions, and plan for follow up.

## 2024-03-27 NOTE — ED PROVIDER NOTES
"History  Chief Complaint   Patient presents with    Hand Pain     Patient states she fell in her house earlier today, no headstrike, no thinners. States she was getting up and her L hand went \"all the way back\" - c/o L hand/wrist pain     HPI    Prior to Admission Medications   Prescriptions Last Dose Informant Patient Reported? Taking?   CALCIUM CARBONATE-VITAMIN D PO  Self Yes No   Sig: Take 1 tablet by mouth daily   DULoxetine (CYMBALTA) 30 mg delayed release capsule   No No   Sig: Take 1 capsule (30 mg total) by mouth daily   losartan (COZAAR) 100 MG tablet   No No   Sig: Take 1 tablet (100 mg total) by mouth daily   mirtazapine (REMERON) 15 mg tablet   No No   Sig: Take 1 tablet (15 mg total) by mouth daily at bedtime as needed (insomnia)   rosuvastatin (CRESTOR) 5 mg tablet   No No   Sig: Take 1 tablet (5 mg total) by mouth 3 (three) times a week      Facility-Administered Medications: None       Past Medical History:   Diagnosis Date    Arthritis     Carpal tunnel syndrome     unspecified laterality    Cataract     Cerebral aneurysm     Decreased body height     last assessed 3/17/14    Head trauma 2021    Heart murmur     Hypercholesteremia     Hypertension     Motion sickness     Obesity     Periodic heart flutter     Pneumonia     x1    PONV (postoperative nausea and vomiting)     Rosacea     Vertigo     Wears glasses     Wears partial dentures     upper       Past Surgical History:   Procedure Laterality Date    CARPAL TUNNEL RELEASE Bilateral      SECTION      x1    COLONOSCOPY      IR BIOPSY LYMPH NODE  2018    IR CEREBRAL ANGIOGRAPHY  10/12/2018    [place] LAPS TOTAL HYSTERECT 250 GM/< W/RMVL TUBE/OVARY N/A 2017    Procedure: HYSTERECTOMY LAPAROSCOPIC TOTAL, BSO, Cystoscopy;  Surgeon: Naida Pablo MD;  Location: [facility];  Service: Gynecology    [place] NEUROPLASTY &/TRANSPOS MEDIAN NRV CARPAL TUNNE Left 2016    Procedure: RELEASE CARPAL TUNNEL;  Surgeon: Jesus Posada MD;  " Location: [facility] OR;  Service: Orthopedics       Family History   Problem Relation Age of Onset    Arthritis Mother     Hypertension Mother     Stroke Mother         syndrome    Diabetes Father     Sudden death Father         instantaneous cardiac    Hyperlipidemia Sister     Colon cancer Sister 79    Hodgkin's lymphoma Daughter 21    No Known Problems Daughter     No Known Problems Daughter     No Known Problems Daughter     Sudden death Brother         instananeous cardiac    No Known Problems Son     No Known Problems Son     No Known Problems Maternal Aunt     No Known Problems Paternal Aunt     No Known Problems Paternal Aunt      I have reviewed and agree with the history as documented.    E-Cigarette/Vaping    E-Cigarette Use Never User      E-Cigarette/Vaping Substances    Nicotine No     THC No     CBD No     Flavoring No     Other No     Unknown No      Social History     Tobacco Use    Smoking status: Never    Smokeless tobacco: Never   Vaping Use    Vaping status: Never Used   Substance Use Topics    Alcohol use: No    Drug use: No        Review of Systems    Physical Exam  ED Triage Vitals [03/26/24 1847]   Temperature Pulse Resp BP SpO2   98.1 °F (36.7 °C) -- -- -- --      Temp src Heart Rate Source Patient Position - Orthostatic VS BP Location FiO2 (%)   -- -- -- -- --      Pain Score       --             Orthostatic Vital Signs  There were no vitals filed for this visit.    Physical Exam    ED Medications  Medications - No data to display    Diagnostic Studies  Results Reviewed       None                   XR hand 3+ views LEFT   ED Interpretation by Briseida Villa DO (03/26 1940)   No obvious fractures.      XR wrist 3+ views LEFT   ED Interpretation by Briseida Villa DO (03/26 1940)   No obvious fractures.            Procedures  Procedures      ED Course  ED Course as of 03/26/24 3610   Tue Mar 26, 2024   1940 No obvious fractures on the plain films obtained of the left wrist and  left hand.   1940 After discussion of risks/benefits/alternatives, will apply left wrist immobilizer, have her follow up with orthopedics for further evaluation of her left wrist and hand pain.                                        Medical Decision Making  Amount and/or Complexity of Data Reviewed  Radiology: ordered and independent interpretation performed.          Disposition  Final diagnoses:   Sprain of left wrist, initial encounter   Fall, initial encounter   Acute pain of left wrist     Time reflects when diagnosis was documented in both MDM as applicable and the Disposition within this note       Time User Action Codes Description Comment    3/26/2024  7:43 PM Briseida Villa [S63.502A] Sprain of left wrist, initial encounter     3/26/2024  7:43 PM Briseida Villa [W19.XXXA] Fall, initial encounter     3/26/2024  7:44 PM Briseida Villa [M25.532] Acute pain of left wrist           ED Disposition       ED Disposition   Discharge    Condition   Stable    Date/Time   Tue Mar 26, 2024  7:43 PM    Comment   Delmi Pedroza discharge to home/self care.                   Follow-up Information       Follow up With Specialties Details Why Contact Info Additional Information    [facility] [place] [facility] Schedule an appointment as soon as possible for a visit   [address] [place] [phone] [facility] [place], [address] [place] [address], [phone]            Discharge Medication List as of 3/26/2024  7:44 PM        CONTINUE these medications which have NOT CHANGED    Details   CALCIUM CARBONATE-VITAMIN D PO Take 1 tablet by mouth daily, Historical Med      DULoxetine (CYMBALTA) 30 mg delayed release capsule Take 1 capsule (30 mg total) by mouth daily, Starting Wed 6/21/2023, Normal      losartan (COZAAR) 100 MG tablet Take 1 tablet (100 mg total) by mouth daily, Starting Mon 3/18/2024,  Normal      mirtazapine (REMERON) 15 mg tablet Take 1 tablet (15 mg total) by mouth daily at bedtime as needed (insomnia), Starting Mon 7/18/2022, Normal      rosuvastatin (CRESTOR) 5 mg tablet Take 1 tablet (5 mg total) by mouth 3 (three) times a week, Starting Mon 3/18/2024, Normal               PDMP Review       None             ED Provider  Attending physically available and evaluated Delmi LAURA Pedroza. I managed the patient along with the ED Attending.    Electronically Signed by         presents with left wrist and hand pain status post fall.  Sickle exam as above, concerning for sprain versus fracture.  Will obtain imaging to evaluate further.  See ED course for remainder of MDM.    Amount and/or Complexity of Data Reviewed  Radiology: ordered and independent interpretation performed.          Disposition  Final diagnoses:   Sprain of left wrist, initial encounter   Fall, initial encounter   Acute pain of left wrist     Time reflects when diagnosis was documented in both MDM as applicable and the Disposition within this note       Time User Action Codes Description Comment    3/26/2024  7:43 PM Briseida Villa [S63.502A] Sprain of left wrist, initial encounter     3/26/2024  7:43 PM Briseida Villa [W19.XXXA] Fall, initial encounter     3/26/2024  7:44 PM Briseida Villa [M25.532] Acute pain of left wrist           ED Disposition       ED Disposition   Discharge    Condition   Stable    Date/Time   Tue Mar 26, 2024  7:43 PM    Comment   Delmi Pedroza discharge to home/self care.                   Follow-up Information       Follow up With Specialties Details Why Contact Info Additional Information    [facility] [place] [facility] Schedule an appointment as soon as possible for a visit   [address] [place] [phone]  [phone] [facility] [place], [address] [place] [address], [phone]            Discharge Medication List as of 3/26/2024  7:44 PM        CONTINUE these medications which have NOT CHANGED    Details   CALCIUM CARBONATE-VITAMIN D PO Take 1 tablet by mouth daily, Historical Med      DULoxetine (CYMBALTA) 30 mg delayed release capsule Take 1 capsule (30 mg total) by mouth daily, Starting Wed 6/21/2023, Normal      losartan (COZAAR) 100 MG tablet Take 1 tablet (100 mg total) by mouth daily, Starting Mon 3/18/2024, Normal      mirtazapine (REMERON) 15 mg tablet Take 1  tablet (15 mg total) by mouth daily at bedtime as needed (insomnia), Starting Mon 7/18/2022, Normal      rosuvastatin (CRESTOR) 5 mg tablet Take 1 tablet (5 mg total) by mouth 3 (three) times a week, Starting Mon 3/18/2024, Normal               PDMP Review       None             ED Provider  Attending physically available and evaluated Delmi Pedroza. I managed the patient along with the ED Attending.    Electronically Signed by                  Briseida Villa DO  04/01/24 0038

## 2024-06-13 ENCOUNTER — RA CDI HCC (OUTPATIENT)
Dept: OTHER | Facility: HOSPITAL | Age: 74
End: 2024-06-13

## 2024-06-25 ENCOUNTER — OFFICE VISIT (OUTPATIENT)
Dept: INTERNAL MEDICINE CLINIC | Facility: CLINIC | Age: 74
End: 2024-06-25
Payer: MEDICARE

## 2024-06-25 VITALS
SYSTOLIC BLOOD PRESSURE: 142 MMHG | BODY MASS INDEX: 33.52 KG/M2 | OXYGEN SATURATION: 97 % | DIASTOLIC BLOOD PRESSURE: 80 MMHG | HEIGHT: 63 IN | TEMPERATURE: 98.3 F | HEART RATE: 93 BPM | WEIGHT: 189.2 LBS

## 2024-06-25 DIAGNOSIS — E11.9 TYPE 2 DIABETES MELLITUS WITHOUT COMPLICATION, WITHOUT LONG-TERM CURRENT USE OF INSULIN (HCC): ICD-10-CM

## 2024-06-25 DIAGNOSIS — F41.8 DEPRESSION WITH ANXIETY: ICD-10-CM

## 2024-06-25 DIAGNOSIS — R41.3 MEMORY DIFFICULTIES: ICD-10-CM

## 2024-06-25 DIAGNOSIS — E78.5 DYSLIPIDEMIA: ICD-10-CM

## 2024-06-25 DIAGNOSIS — I35.0 AORTIC STENOSIS, MILD: ICD-10-CM

## 2024-06-25 DIAGNOSIS — E55.9 VITAMIN D DEFICIENCY: ICD-10-CM

## 2024-06-25 DIAGNOSIS — I10 ACCELERATED ESSENTIAL HYPERTENSION: Primary | ICD-10-CM

## 2024-06-25 PROCEDURE — G2211 COMPLEX E/M VISIT ADD ON: HCPCS | Performed by: FAMILY MEDICINE

## 2024-06-25 PROCEDURE — 99214 OFFICE O/P EST MOD 30 MIN: CPT | Performed by: FAMILY MEDICINE

## 2024-06-25 RX ORDER — DONEPEZIL HYDROCHLORIDE 5 MG/1
5 TABLET, FILM COATED ORAL
Qty: 30 TABLET | Refills: 5 | Status: SHIPPED | OUTPATIENT
Start: 2024-06-25

## 2024-06-25 NOTE — PROGRESS NOTES
Ambulatory Visit  Name: Delmi Pedorza      : 1950      MRN: [id number]  Encounter Provider: Edelmira Noe MD  Encounter Date: 2024   Encounter department: [facility]    Assessment & Plan   1. Accelerated essential hypertension  -     CBC and differential; Future  -     Comprehensive metabolic panel; Future  2. Type 2 diabetes mellitus without complication, without long-term current use of insulin (HCC)  -     CBC and differential; Future  -     Comprehensive metabolic panel; Future  -     Hemoglobin A1C; Future  -     Albumin / creatinine urine ratio  3. Aortic stenosis, mild  -     CBC and differential; Future  -     Comprehensive metabolic panel; Future  4. Depression with anxiety  -     CBC and differential; Future  -     Comprehensive metabolic panel; Future  5. Dyslipidemia  -     CBC and differential; Future  -     Comprehensive metabolic panel; Future  -     Lipid panel; Future  -     TSH, 3rd generation; Future  6. Vitamin D deficiency  -     CBC and differential; Future  -     Comprehensive metabolic panel; Future  -     Vitamin D 25 hydroxy; Future  7. Memory difficulties  -     donepezil (ARICEPT) 5 mg tablet; Take 1 tablet (5 mg total) by mouth daily at bedtime  -     CBC and differential; Future  -     Comprehensive metabolic panel; Future    Orders and recommendations as noted above.  Discussed significant stressors with her.  Discussed financial issues with her.  Recommendations made that may help with her financial stressors.  Discussed contacting office of the aging for assistance as well.  Discussed with her that the memory issues are likely related to her stress and anxiety as well as some depression symptoms.  Discussed potentially increasing the dose of the Cymbalta in the future.  Discussed further investigation for the memory issues.  Discussed neuro quant MRI but she declines at present because of potential costs.  Will start low-dose  Aricept.  Discussed potential side effects with her.  Discussed getting an echocardiogram for follow-up of the aortic stenosis.  She wishes to hold off at present because of financial constraints.  Blood pressure currently relatively well-controlled.  Continue with the losartan.  Continue with the Crestor.  Slip given again for laboratory testing.  Continue with the Crestor as previously.  Watch for any potential side effects.  Continue with calcium and vitamin D supplementation.  Will have her follow-up in about 3 to 4 months or sooner if needed.     History of Present Illness     She presents for routine follow-up.  Has generally been doing relatively well.  Has had a lot of stressors.  She has a lot of financial concerns at present.  She is considering selling her home and moving into an apartment.  Has difficulty concentrating because of the stressors.  Her children are concerned about potential memory issues.  She declines further investigation at this point because of potential costs.  She is considering selling her home over the next few years.  Has had to deal with the loss of her son from a glioblastoma as well as the death of her  and the ongoing financial issues.  Has been having significant issues with her knees with increasing pain.  She declines any further investigation or treatment at present since this will likely resulted in additional costs.  Denies any chest pain or palpitations.  Tolerating her losartan without difficulty.  Denies any headaches or localized weakness.  Continues with her cholesterol medication.  Does not feel that her current joint symptoms are related to the cholesterol medication.  Appetite has been relatively stable.        Review of Systems   Constitutional:  Positive for activity change. Negative for appetite change, chills and fever.   HENT:  Negative for congestion and rhinorrhea.    Eyes:  Negative for visual disturbance.   Respiratory:  Negative for chest  tightness and shortness of breath.    Cardiovascular:  Negative for chest pain and palpitations.   Gastrointestinal:  Negative for abdominal pain, blood in stool, diarrhea, nausea and vomiting.   Endocrine: Negative for polydipsia, polyphagia and polyuria.   Genitourinary:  Negative for dysuria, frequency and urgency.   Musculoskeletal:  Positive for arthralgias and gait problem.   Skin:  Negative for color change.   Neurological:  Negative for dizziness and headaches.   Hematological:  Does not bruise/bleed easily.   Psychiatric/Behavioral:  Positive for decreased concentration, dysphoric mood and sleep disturbance. Negative for confusion. The patient is nervous/anxious.      Medical History Reviewed by provider this encounter:       Past Medical History   Past Medical History:   Diagnosis Date    Arthritis     Carpal tunnel syndrome     unspecified laterality    Cataract     Cerebral aneurysm     Decreased body height     last assessed 3/17/14    Head trauma 2021    Heart murmur     Hypercholesteremia     Hypertension     Motion sickness     Obesity     Periodic heart flutter     Pneumonia     x1    PONV (postoperative nausea and vomiting)     Rosacea     Vertigo     Wears glasses     Wears partial dentures     upper     Past Surgical History:   Procedure Laterality Date    CARPAL TUNNEL RELEASE Bilateral      SECTION      x1    COLONOSCOPY      IR BIOPSY LYMPH NODE  2018    IR CEREBRAL ANGIOGRAPHY  10/12/2018    [place] LAPS TOTAL HYSTERECT 250 GM/< W/RMVL TUBE/OVARY N/A 2017    Procedure: HYSTERECTOMY LAPAROSCOPIC TOTAL, BSO, Cystoscopy;  Surgeon: Naida Pablo MD;  Location: [facility];  Service: Gynecology    [place] NEUROPLASTY &/TRANSPOS MEDIAN NRV CARPAL TUNNE Left 2016    Procedure: RELEASE CARPAL TUNNEL;  Surgeon: Jesus Posada MD;  Location: [facility];  Service: Orthopedics     Family History   Problem Relation Age of Onset    Arthritis Mother     Hypertension Mother     Stroke  Mother         syndrome    Diabetes Father     Sudden death Father         instantaneous cardiac    Hyperlipidemia Sister     Colon cancer Sister 79    Hodgkin's lymphoma Daughter 21    No Known Problems Daughter     No Known Problems Daughter     No Known Problems Daughter     Sudden death Brother         instananeous cardiac    No Known Problems Son     No Known Problems Son     No Known Problems Maternal Aunt     No Known Problems Paternal Aunt     No Known Problems Paternal Aunt      Current Outpatient Medications on File Prior to Visit   Medication Sig Dispense Refill    CALCIUM CARBONATE-VITAMIN D PO Take 1 tablet by mouth daily      DULoxetine (CYMBALTA) 30 mg delayed release capsule Take 1 capsule (30 mg total) by mouth daily 90 capsule 3    losartan (COZAAR) 100 MG tablet Take 1 tablet (100 mg total) by mouth daily 90 tablet 1    mirtazapine (REMERON) 15 mg tablet Take 1 tablet (15 mg total) by mouth daily at bedtime as needed (insomnia) 90 tablet 3    rosuvastatin (CRESTOR) 5 mg tablet Take 1 tablet (5 mg total) by mouth 3 (three) times a week 36 tablet 3     No current facility-administered medications on file prior to visit.     Allergies   Allergen Reactions    Penicillins Hives and Rash      Current Outpatient Medications on File Prior to Visit   Medication Sig Dispense Refill    CALCIUM CARBONATE-VITAMIN D PO Take 1 tablet by mouth daily      DULoxetine (CYMBALTA) 30 mg delayed release capsule Take 1 capsule (30 mg total) by mouth daily 90 capsule 3    losartan (COZAAR) 100 MG tablet Take 1 tablet (100 mg total) by mouth daily 90 tablet 1    mirtazapine (REMERON) 15 mg tablet Take 1 tablet (15 mg total) by mouth daily at bedtime as needed (insomnia) 90 tablet 3    rosuvastatin (CRESTOR) 5 mg tablet Take 1 tablet (5 mg total) by mouth 3 (three) times a week 36 tablet 3     No current facility-administered medications on file prior to visit.      Social History     Tobacco Use    Smoking status: Never  "   Smokeless tobacco: Never   Vaping Use    Vaping status: Never Used   Substance and Sexual Activity    Alcohol use: No    Drug use: No    Sexual activity: Not on file     Objective     /80 (BP Location: Left arm, Patient Position: Sitting, Cuff Size: Large)   Pulse 93   Temp 98.3 °F (36.8 °C)   Ht 5' 3\" (1.6 m)   Wt 85.8 kg (189 lb 3.2 oz)   LMP  (LMP Unknown)   SpO2 97%   BMI 33.52 kg/m²     Physical Exam  Vitals and nursing note reviewed.   Constitutional:       General: She is not in acute distress.     Appearance: She is well-developed and well-groomed.   HENT:      Head: Normocephalic and atraumatic.   Eyes:      General:         Right eye: No discharge.         Left eye: No discharge.      Conjunctiva/sclera: Conjunctivae normal.      Pupils: Pupils are equal, round, and reactive to light.   Cardiovascular:      Rate and Rhythm: Normal rate and regular rhythm.      Heart sounds: Murmur heard.      Systolic murmur is present with a grade of 2/6.      No friction rub. No gallop.   Pulmonary:      Effort: No respiratory distress.      Breath sounds: No wheezing or rales.   Abdominal:      General: Bowel sounds are normal. There is no distension.      Tenderness: There is no abdominal tenderness.   Musculoskeletal:      Comments: Degenerative changes bilateral knees.   Lymphadenopathy:      Cervical: No cervical adenopathy.   Skin:     General: Skin is warm and dry.   Neurological:      Mental Status: She is alert and oriented to person, place, and time.   Psychiatric:         Mood and Affect: Mood is anxious.         Speech: Speech normal.         Behavior: Behavior normal. Behavior is cooperative.         Cognition and Memory: She exhibits impaired recent memory.       Administrative Statements           "

## 2024-09-10 NOTE — PLAN OF CARE
5 cigars per week.   Also smokes a pipe--few puffs several times per day.    DISCHARGE PLANNING     Discharge to home or other facility with appropriate resources Progressing        INFECTION - ADULT     Absence or prevention of progression during hospitalization Progressing        Knowledge Deficit     Patient/family/caregiver demonstrates understanding of disease process, treatment plan, medications, and discharge instructions Progressing        PAIN - ADULT     Verbalizes/displays adequate comfort level or baseline comfort level Progressing        Potential for Falls     Patient will remain free of falls Progressing        SAFETY ADULT     Maintain or return to baseline ADL function Progressing     Maintain or return mobility status to optimal level Progressing

## 2024-10-24 ENCOUNTER — RA CDI HCC (OUTPATIENT)
Dept: OTHER | Facility: HOSPITAL | Age: 74
End: 2024-10-24

## 2024-10-26 DIAGNOSIS — F41.8 DEPRESSION WITH ANXIETY: ICD-10-CM

## 2024-10-28 RX ORDER — DULOXETIN HYDROCHLORIDE 30 MG/1
30 CAPSULE, DELAYED RELEASE ORAL DAILY
Qty: 90 CAPSULE | Refills: 1 | Status: SHIPPED | OUTPATIENT
Start: 2024-10-28

## 2024-10-28 NOTE — TELEPHONE ENCOUNTER
Patient called to request a refill for duloxetine. She was advised a refill was requested on 10/26/24 from [facility] and is pending approval. Patient verbalized understanding and is in agreement.      Patient is out of med. Rerouting with high priority

## 2024-10-31 ENCOUNTER — OFFICE VISIT (OUTPATIENT)
Dept: INTERNAL MEDICINE CLINIC | Facility: CLINIC | Age: 74
End: 2024-10-31
Payer: MEDICARE

## 2024-10-31 VITALS
WEIGHT: 182.9 LBS | SYSTOLIC BLOOD PRESSURE: 106 MMHG | DIASTOLIC BLOOD PRESSURE: 68 MMHG | BODY MASS INDEX: 32.41 KG/M2 | HEIGHT: 63 IN | OXYGEN SATURATION: 97 % | TEMPERATURE: 97.8 F | HEART RATE: 87 BPM

## 2024-10-31 DIAGNOSIS — E55.9 VITAMIN D DEFICIENCY: ICD-10-CM

## 2024-10-31 DIAGNOSIS — I35.0 AORTIC STENOSIS, MILD: ICD-10-CM

## 2024-10-31 DIAGNOSIS — Z12.31 VISIT FOR SCREENING MAMMOGRAM: ICD-10-CM

## 2024-10-31 DIAGNOSIS — F41.8 DEPRESSION WITH ANXIETY: ICD-10-CM

## 2024-10-31 DIAGNOSIS — E11.9 TYPE 2 DIABETES MELLITUS WITHOUT COMPLICATION, WITHOUT LONG-TERM CURRENT USE OF INSULIN (HCC): Primary | ICD-10-CM

## 2024-10-31 DIAGNOSIS — F51.01 PRIMARY INSOMNIA: ICD-10-CM

## 2024-10-31 DIAGNOSIS — Z83.2 FAMILY HISTORY OF FACTOR V LEIDEN MUTATION: ICD-10-CM

## 2024-10-31 DIAGNOSIS — E78.5 DYSLIPIDEMIA: ICD-10-CM

## 2024-10-31 DIAGNOSIS — Z78.0 POSTMENOPAUSAL: ICD-10-CM

## 2024-10-31 DIAGNOSIS — I67.1 CEREBRAL ANEURYSM: ICD-10-CM

## 2024-10-31 PROCEDURE — 99214 OFFICE O/P EST MOD 30 MIN: CPT | Performed by: FAMILY MEDICINE

## 2024-10-31 PROCEDURE — G2211 COMPLEX E/M VISIT ADD ON: HCPCS | Performed by: FAMILY MEDICINE

## 2024-11-01 NOTE — ASSESSMENT & PLAN NOTE
Lab Results   Component Value Date    HGBA1C 6.0 (H) 03/05/2024       Orders:    CBC and differential; Future    Comprehensive metabolic panel; Future    Hemoglobin A1C; Future

## 2024-11-01 NOTE — PROGRESS NOTES
Ambulatory Visit  Name: Delmi Pedroza      : 1950      MRN: [id number]  Encounter Provider: Edelmira Noe MD  Encounter Date: 10/31/2024   Encounter department: [facility]    Assessment & Plan  Type 2 diabetes mellitus without complication, without long-term current use of insulin (HCC)    Lab Results   Component Value Date    HGBA1C 6.0 (H) 2024       Orders:    CBC and differential; Future    Comprehensive metabolic panel; Future    Hemoglobin A1C; Future    Cerebral aneurysm    Orders:    Ambulatory Referral to Neurosurgery; Future    Aortic stenosis, mild    Orders:    Echo complete w/ contrast if indicated; Future    Depression with anxiety           Primary insomnia    Orders:    CBC and differential; Future    Dyslipidemia    Orders:    Comprehensive metabolic panel; Future    Lipid panel; Future    TSH, 3rd generation; Future    Vitamin D deficiency         Visit for screening mammogram    Orders:    Mammo screening bilateral w 3d and cad; Future    Postmenopausal    Orders:    DXA bone density spine hip and pelvis; Future    Orders recommendations as noted above.  She is overdue for laboratory testing.  Prescription given for this.  Will check hemoglobin A1c.  Watch carbohydrate intake.  Try to remain as active as possible.  Follow-up with ophthalmology regularly.  Check feet daily.  Blood pressure currently on the low side.  Watch for any orthostatic symptoms.  Stay adequately hydrated.  Memory does not seem to be much of an issue at present.  Likely the memory issues are related to the stressors over the last few years with the death of her  and son as well as her transient lifestyle staying with her daughter.  Continue with the Cymbalta.  Watch for any worsening of mood.  Continue with the Remeron for sleep.  Cholesterol had been elevated somewhat in the past.  Will recheck this with upcoming laboratory testing.  Continue with calcium and vitamin  Chief Complaint    No chief complaint on file. History of Present Illness:  Phi Lama is a 37 y.o. male who comes in today for evaluation and treatment of left hand pain with numbness and tingling. Patient reports a gradual onset with chronic symptoms including numbness and tingling into the median distribution of his left hand. It typically involves his index and and thumb.  his symptoms have been getting worse in the recent weeks. 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 He did have a right carpal tunnel release about 3 years ago by Dr. Padmaja Pratt  Location of Pain: Wrist  Location Modifiers: Left  Severity of Pain: 7  Frequency of Pain: Intermittent  Limiting Behavior: Yes  Result of Injury: No  Work-Related Injury: No  Are there other pain locations you wish to document?: No]    Medical History:  Patient's medications, allergies, past medical, surgical, social and family histories were reviewed and updated as appropriate. Review of Systems:  Pertinent items are noted in HPI  Review of systems reviewed from Patient History Form dated on 6/28/2019 and available in the patient's chart under the Media tab. Vital Signs:  Ht 5' 10.5\" (1.791 m)   Wt 280 lb (127 kg)   BMI 39.61 kg/m²     General Exam:   Constitutional: Patient is adequately groomed with no evidence of malnutrition  DTRs: Deep tendon reflexes are intact  Mental Status: The patient is oriented to time, place and person. The patient's mood and affect are appropriate. Lymphatic: The lymphatic examination bilaterally reveals all areas to be without enlargement or induration. Vascular: Examination reveals no swelling or calf tenderness. Peripheral pulses are palpable and 2+. Left wrist Examination:    Inspection: No muscle atrophy, deformity.   No redness, scars, or lesions    Palpation: No significant palpable D supplementation.  Discussed with her following up with neurosurgery regarding the cerebral aneurysm.  Previous CT scan reviewed and this did not show any change.  Is up-to-date on the flu shot.  Slip given for mammogram and bone density.  Will have her follow-up in about 3 to 5 months or sooner if needed depending on upcoming laboratory testing.    Depression Screening and Follow-up Plan: Patient was screened for depression during today's encounter. They screened negative with a PHQ-9 score of 0.      History of Present Illness     She presents for follow-up.  She has now been staying slowly at home.  She is no longer babysitting her granddaughter.  She is trying to adjust to being home all the time again.  Has been doing some activities around her home.  She has been sleeping better with the Remeron.  Mood has improved somewhat.  Still misses her son greatly.  Still feels down at times but does feel that the Cymbalta helps.  Tolerating her losartan without difficulty.  Denies any headaches or localized weakness.  Denies any orthostatic symptoms.  Denies any recent falls.  She has not had any significant headaches.  Denies any weakness or numbness.  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  She would, however, like to arrange follow-up.  Has not had recent echocardiogram for the aortic stenosis.  She admits that she had put off most testing and most care of herself over the past few years.  Her daughter did do testing and was found to have a mutation for the factor V Leiden.  Her physician had recommended that the entire family get tested.  Still with some joint symptoms especially into the knees.  Denies any significant polyuria, polydipsia, or polyphasia.  Denies any vision changes.          History obtained from : patient  Review of Systems   Constitutional:  Negative for  tenderness. No CMC joint arthritis. Range of Motion: Full range of motion of the wrist, hand, and fingers. Strength:  strength is mildly diminished. Special Tests: Positive Tinel sign and carpal tunnel exam.  Positive Phalen's exam.    Skin: There are no rashes, ulcerations or lesions. Gait: Normal gait pattern    Reflex normal deep tendon reflexes    Additional Comments:       Additional Examinations:         Right Upper Extremity:  Examination of the right upper extremity does not show any tenderness, deformity or injury. Range of motion is unremarkable. There is no gross instability. There are no rashes, ulcerations or lesions. Strength and tone are normal.          Impression:  Encounter Diagnosis   Name Primary?  Carpal tunnel syndrome of left wrist Yes       Office Procedures:  No orders of the defined types were placed in this encounter. Treatment Plan: Today we have gone over the diagnosis and the recommendations for treatment. His symptoms are consistent with carpal tunnel syndrome. He is tried conservative treatment with a night splint and oral anti-inflammatories with no improvement. We will go ahead and order an EMG of his left upper extremity but also consent him for left carpal tunnel release today. We will call him with the results of the EMG examination months with a been obtained. We will schedule surgery accordingly. The risks, benefits, and alternatives to surgery were clearly discussed with him in great detail and all questions were thoroughly answered at this time. activity change, appetite change, chills and fever.   HENT:  Negative for congestion and rhinorrhea.    Eyes:  Negative for visual disturbance.   Respiratory:  Negative for chest tightness and shortness of breath.    Cardiovascular:  Negative for chest pain and palpitations.   Gastrointestinal:  Negative for abdominal pain, blood in stool, diarrhea, nausea and vomiting.   Endocrine: Negative for polydipsia, polyphagia and polyuria.   Genitourinary:  Negative for dysuria, frequency and urgency.   Musculoskeletal:  Positive for arthralgias. Negative for gait problem.   Skin:  Negative for color change.   Neurological:  Negative for dizziness, weakness and headaches.   Hematological:  Does not bruise/bleed easily.   Psychiatric/Behavioral:  Positive for sleep disturbance. Negative for confusion. The patient is not nervous/anxious.      Medical History Reviewed by provider this encounter:  Tobacco  Allergies  Meds  Problems  Med Hx  Surg Hx  Fam Hx       Past Medical History   Past Medical History:   Diagnosis Date    Arthritis     Carpal tunnel syndrome     unspecified laterality    Cataract     Cerebral aneurysm     Decreased body height     last assessed 3/17/14    Head trauma 2021    Heart murmur     Hypercholesteremia     Hypertension     Motion sickness     Obesity     Periodic heart flutter     Pneumonia     x1    PONV (postoperative nausea and vomiting)     Rosacea     Vertigo     Wears glasses     Wears partial dentures     upper     Past Surgical History:   Procedure Laterality Date    CARPAL TUNNEL RELEASE Bilateral      SECTION      x1    COLONOSCOPY      IR BIOPSY LYMPH NODE  2018    IR CEREBRAL ANGIOGRAPHY  10/12/2018    [place] LAPS TOTAL HYSTERECT 250 GM/< W/RMVL TUBE/OVARY N/A 2017    Procedure: HYSTERECTOMY LAPAROSCOPIC TOTAL, BSO, Cystoscopy;  Surgeon: Naida Pablo MD;  Location: [facility];  Service: Gynecology    [place] NEUROPLASTY &/TRANSPOS MEDIAN NRV CARPAL TUNNE Left  4/8/2016    Procedure: RELEASE CARPAL TUNNEL;  Surgeon: Jesus Posada MD;  Location: [facility];  Service: Orthopedics     Family History   Problem Relation Age of Onset    Arthritis Mother     Hypertension Mother     Stroke Mother         syndrome    Diabetes Father     Sudden death Father         instantaneous cardiac    Hyperlipidemia Sister     Colon cancer Sister 79    Hodgkin's lymphoma Daughter 21    No Known Problems Daughter     No Known Problems Daughter     No Known Problems Daughter     Sudden death Brother         instananeous cardiac    No Known Problems Son     No Known Problems Son     No Known Problems Maternal Aunt     No Known Problems Paternal Aunt     No Known Problems Paternal Aunt      Current Outpatient Medications on File Prior to Visit   Medication Sig Dispense Refill    CALCIUM CARBONATE-VITAMIN D PO Take 1 tablet by mouth daily      donepezil (ARICEPT) 5 mg tablet Take 1 tablet (5 mg total) by mouth daily at bedtime 30 tablet 5    DULoxetine (CYMBALTA) 30 mg delayed release capsule Take 1 capsule by mouth once daily 90 capsule 1    losartan (COZAAR) 100 MG tablet Take 1 tablet (100 mg total) by mouth daily 90 tablet 1    mirtazapine (REMERON) 15 mg tablet Take 1 tablet (15 mg total) by mouth daily at bedtime as needed (insomnia) 90 tablet 3     No current facility-administered medications on file prior to visit.     Allergies   Allergen Reactions    Penicillins Hives and Rash      Current Outpatient Medications on File Prior to Visit   Medication Sig Dispense Refill    CALCIUM CARBONATE-VITAMIN D PO Take 1 tablet by mouth daily      donepezil (ARICEPT) 5 mg tablet Take 1 tablet (5 mg total) by mouth daily at bedtime 30 tablet 5    DULoxetine (CYMBALTA) 30 mg delayed release capsule Take 1 capsule by mouth once daily 90 capsule 1    losartan (COZAAR) 100 MG tablet Take 1 tablet (100 mg total) by mouth daily 90 tablet 1    mirtazapine (REMERON) 15 mg tablet Take 1 tablet (15 mg total)  "by mouth daily at bedtime as needed (insomnia) 90 tablet 3     No current facility-administered medications on file prior to visit.      Social History     Tobacco Use    Smoking status: Never    Smokeless tobacco: Never   Vaping Use    Vaping status: Never Used   Substance and Sexual Activity    Alcohol use: No    Drug use: No    Sexual activity: Not on file         Objective     /68 (BP Location: Left arm, Patient Position: Sitting, Cuff Size: Large)   Pulse 87   Temp 97.8 °F (36.6 °C)   Ht 5' 3\" (1.6 m)   Wt 83 kg (182 lb 14.4 oz)   LMP  (LMP Unknown)   SpO2 97%   BMI 32.40 kg/m²     Physical Exam  Vitals and nursing note reviewed.   Constitutional:       General: She is not in acute distress.     Appearance: She is well-developed, well-groomed and overweight.   HENT:      Head: Normocephalic and atraumatic.   Eyes:      General:         Right eye: No discharge.         Left eye: No discharge.      Conjunctiva/sclera: Conjunctivae normal.      Pupils: Pupils are equal, round, and reactive to light.   Cardiovascular:      Rate and Rhythm: Normal rate and regular rhythm.      Heart sounds: Murmur heard.      Systolic murmur is present with a grade of 2/6.      No friction rub. No gallop.   Pulmonary:      Effort: No respiratory distress.      Breath sounds: No wheezing or rales.   Abdominal:      General: Bowel sounds are normal. There is no distension.      Tenderness: There is no abdominal tenderness.   Lymphadenopathy:      Cervical: No cervical adenopathy.   Skin:     General: Skin is warm and dry.   Neurological:      Mental Status: She is alert and oriented to person, place, and time.   Psychiatric:         Mood and Affect: Mood and affect normal.         Speech: Speech normal.         Behavior: Behavior normal. Behavior is cooperative.         Cognition and Memory: Cognition and memory normal.         "

## 2024-11-04 ENCOUNTER — TELEPHONE (OUTPATIENT)
Age: 74
End: 2024-11-04

## 2024-11-04 NOTE — TELEPHONE ENCOUNTER
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 Pt states she has no symptoms and feels fine. I explained to her symptoms of Covid and what to be aware of and that she should wear a mask in public places for 5 days. She verbalized understanding and will call if she starts to get symptoms.

## 2024-11-06 ENCOUNTER — TELEPHONE (OUTPATIENT)
Age: 74
End: 2024-11-06

## 2024-11-21 NOTE — ASSESSMENT & PLAN NOTE
Follow up of a known L MCA aneurysm.   Incidentally noted on work up after a fall in 2018  S/p diagnostic angiogram 10/2018  No FH of aneurysm, non-smoker   Lost to follow up for several years due to deaths in the family    Imaging:   CTA head 3/8/24: Stable examination with chronic microangiopathic change and small old lacunar infarcts. Stable 7 mm x 6 mm left M1 bifurcation aneurysm.    Plan:   Reviewed imaging with patient and Dr. La.  Stable appearance to 7 mm left MCA bifurcation aneurysm.  Discussed natural history of aneurysms and risk of rupture.  Discussed genetic component to aneurysms as well as modifiable risk factors such as smoking and HTN.  Given current size and location, risk of rupture is 1.56%/year per UCAS, however her aneurysm is fusiform, so this is not specific to her type of aneurysm.  Discussed option of angiogram versus continued surveillance. She would be agreeable to potential angiogram. With her imaging already being over 6 months old, recommend up dated CTA. Since she doesn't drive distances, patient would like to wait until closer to March which is 1 year from the last study. This is reasonable.   Reviewed red flag s/s.  Follow up in March 2025 with CTA head to see Dr. La.   Call sooner with any questions or concerns.     Orders:    Ambulatory Referral to Neurosurgery    BUN; Future    Creatinine, serum; Future    CTA head w wo contrast; Future

## 2024-11-25 ENCOUNTER — OFFICE VISIT (OUTPATIENT)
Dept: NEUROSURGERY | Facility: CLINIC | Age: 74
End: 2024-11-25
Payer: MEDICARE

## 2024-11-25 VITALS
SYSTOLIC BLOOD PRESSURE: 168 MMHG | BODY MASS INDEX: 32.43 KG/M2 | WEIGHT: 183 LBS | DIASTOLIC BLOOD PRESSURE: 80 MMHG | HEIGHT: 63 IN | RESPIRATION RATE: 16 BRPM | TEMPERATURE: 97.8 F | HEART RATE: 66 BPM | OXYGEN SATURATION: 98 %

## 2024-11-25 DIAGNOSIS — I67.1 CEREBRAL ANEURYSM: ICD-10-CM

## 2024-11-25 PROCEDURE — 99204 OFFICE O/P NEW MOD 45 MIN: CPT | Performed by: PHYSICIAN ASSISTANT

## 2024-11-25 NOTE — PROGRESS NOTES
Name: Delmi Pedroza      : 1950      MRN: [id number]  Encounter Provider: Jossue La MD  Encounter Date: 2024   Encounter department: [facility] [place]  :  Assessment & Plan  Cerebral aneurysm  Follow up of a known L MCA aneurysm.   Incidentally noted on work up after a fall in   S/p diagnostic angiogram 10/2018  No FH of aneurysm, non-smoker   Lost to follow up for several years due to deaths in the family    Imaging:   CTA head 3/8/24: Stable examination with chronic microangiopathic change and small old lacunar infarcts. Stable 7 mm x 6 mm left M1 bifurcation aneurysm.    Plan:   Reviewed imaging with patient and Dr. La.  Stable appearance to 7 mm left MCA bifurcation aneurysm.  Discussed natural history of aneurysms and risk of rupture.  Discussed genetic component to aneurysms as well as modifiable risk factors such as smoking and HTN.  Given current size and location, risk of rupture is 1.56%/year per UCAS, however her aneurysm is fusiform, so this is not specific to her type of aneurysm.  Discussed option of angiogram versus continued surveillance. She would be agreeable to potential angiogram. With her imaging already being over 6 months old, recommend up dated CTA. Since she doesn't drive distances, patient would like to wait until closer to March which is 1 year from the last study. This is reasonable.   Reviewed red flag s/s.  Follow up in 2025 with CTA head to see Dr. La.   Call sooner with any questions or concerns.     Orders:    Ambulatory Referral to Neurosurgery    BUN; Future    Creatinine, serum; Future    CTA head w wo contrast; Future        History of Present Illness     74 year old female seen to re-establish care for a left MCA aneurysm.  This initially was noted on workup after a fall in .  She underwent a diagnostic angiogram in 2018 with plans for continued surveillance.  Denies family history of aneurysm,  states that her 14-month-old daughter passed away suddenly after hitting her head.  Reports that autopsy did not tell him anything conclusive.  Non-smoker.  Patient was also followed for several years due to her  passing away as well as her son passing away from GBM.  Also mentions that one of her daughters was diagnosed with factor V Leiden.  She is not noting any new neurologic complaints since last seen.      Review of Systems   HENT:  Negative for tinnitus.    Eyes:  Negative for visual disturbance.   Respiratory:  Negative for chest tightness and shortness of breath.    Gastrointestinal: Negative.    Genitourinary: Negative.    Musculoskeletal:  Negative for gait problem.   Neurological:  Negative for dizziness, seizures, speech difficulty, weakness, light-headedness, numbness and headaches.   Psychiatric/Behavioral:  Negative for confusion and decreased concentration.     I have personally reviewed the MA's review of systems and made changes as necessary.    Past Medical History   Past Medical History:   Diagnosis Date    Arthritis     Carpal tunnel syndrome     unspecified laterality    Cataract     Cerebral aneurysm     Decreased body height     last assessed 3/17/14    Head trauma 2021    Heart murmur     Hypercholesteremia     Hypertension     Motion sickness     Obesity     Periodic heart flutter     Pneumonia     x1    PONV (postoperative nausea and vomiting)     Rosacea     Vertigo     Wears glasses     Wears partial dentures     upper     Past Surgical History:   Procedure Laterality Date    CARPAL TUNNEL RELEASE Bilateral      SECTION      x1    COLONOSCOPY      IR BIOPSY LYMPH NODE  2018    IR CEREBRAL ANGIOGRAPHY  10/12/2018    [place] LAPS TOTAL HYSTERECT 250 GM/< W/RMVL TUBE/OVARY N/A 2017    Procedure: HYSTERECTOMY LAPAROSCOPIC TOTAL, BSO, Cystoscopy;  Surgeon: Naida Pablo MD;  [facility];  Service: Gynecology    MI NEUROPLASTY &/TRANSPOS MEDIAN NRV CARPAL  LICO Left 4/8/2016    Procedure: RELEASE CARPAL TUNNEL;  Surgeon: Jesus Posada MD;  [facility];  Service: Orthopedics     Family History   Problem Relation Age of Onset    Arthritis Mother     Hypertension Mother     Stroke Mother         syndrome    Diabetes Father     Sudden death Father         instantaneous cardiac    Hyperlipidemia Sister     Colon cancer Sister 79    Hodgkin's lymphoma Daughter 21    No Known Problems Daughter     No Known Problems Daughter     No Known Problems Daughter     Sudden death Brother         instananeous cardiac    No Known Problems Son     No Known Problems Son     No Known Problems Maternal Aunt     No Known Problems Paternal Aunt     No Known Problems Paternal Aunt       reports that she has never smoked. She has never used smokeless tobacco. She reports that she does not drink alcohol and does not use drugs.  Current Outpatient Medications on File Prior to Visit   Medication Sig Dispense Refill    CALCIUM CARBONATE-VITAMIN D PO Take 1 tablet by mouth daily      donepezil (ARICEPT) 5 mg tablet Take 1 tablet (5 mg total) by mouth daily at bedtime 30 tablet 5    DULoxetine (CYMBALTA) 30 mg delayed release capsule Take 1 capsule by mouth once daily 90 capsule 1    losartan (COZAAR) 100 MG tablet Take 1 tablet (100 mg total) by mouth daily 90 tablet 1    mirtazapine (REMERON) 15 mg tablet Take 1 tablet (15 mg total) by mouth daily at bedtime as needed (insomnia) (Patient not taking: Reported on 11/25/2024) 90 tablet 3     No current facility-administered medications on file prior to visit.     Allergies   Allergen Reactions    Penicillins Hives and Rash    Rosuvastatin Myalgia     Leg pain      Social History     Tobacco Use    Smoking status: Never    Smokeless tobacco: Never   Vaping Use    Vaping status: Never Used   Substance and Sexual Activity    Alcohol use: No    Drug use: No    Sexual activity: Not on file        Objective   /80 (BP Location: Left arm,  "Patient Position: Sitting, Cuff Size: Large)   Pulse 66   Temp 97.8 °F (36.6 °C) (Temporal)   Resp 16   Ht 5' 3\" (1.6 m)   Wt 83 kg (183 lb)   LMP  (LMP Unknown)   SpO2 98%   BMI 32.42 kg/m²     Physical Exam  Vitals reviewed.   Constitutional:       General: She is awake.      Appearance: Normal appearance.   HENT:      Head: Normocephalic and atraumatic.   Eyes:      Extraocular Movements: EOM normal.      Conjunctiva/sclera: Conjunctivae normal.      Pupils: Pupils are equal, round, and reactive to light.   Cardiovascular:      Rate and Rhythm: Normal rate.   Pulmonary:      Effort: Pulmonary effort is normal.   Skin:     General: Skin is warm and dry.   Neurological:      Mental Status: She is alert and oriented to person, place, and time.      Motor: Motor strength is normal.     Coordination: Finger-Nose-Finger Test normal.      Gait: Gait is intact.      Deep Tendon Reflexes:      Reflex Scores:       Bicep reflexes are 2+ on the right side and 2+ on the left side.       Brachioradialis reflexes are 2+ on the right side and 2+ on the left side.       Patellar reflexes are 2+ on the right side and 2+ on the left side.  Psychiatric:         Attention and Perception: Attention and perception normal.         Mood and Affect: Mood and affect normal.         Speech: Speech normal.         Behavior: Behavior normal. Behavior is cooperative.         Thought Content: Thought content normal.         Cognition and Memory: Cognition and memory normal.         Judgment: Judgment normal.       Neurologic Exam     Mental Status   Oriented to person, place, and time.   Oriented to year and month.   Follows 2 step commands.   Attention: normal. Concentration: normal.   Speech: speech is normal   Level of consciousness: alert  Knowledge: good. Able to perform simple calculations.   Able to name object.     Cranial Nerves     CN III, IV, VI   Pupils are equal, round, and reactive to light.  Extraocular motions are " normal.   Right pupil: Shape: regular. Reactivity: brisk. Consensual response: intact.   Left pupil: Shape: regular. Reactivity: brisk. Consensual response: intact.   CN III: no CN III palsy  CN VI: no CN VI palsy  Nystagmus: none   Ophthalmoparesis: none  Upgaze: normal  Downgaze: normal  Conjugate gaze: present    CN V   Facial sensation intact.     CN VII   Facial expression full, symmetric.     CN VIII   CN VIII normal.     CN XI   Right trapezius strength: normal  Left trapezius strength: normal    CN XII   CN XII normal.     Motor Exam   Muscle bulk: normal  Overall muscle tone: normal  Right arm pronator drift: absent  Left arm pronator drift: absent    Strength   Strength 5/5 throughout.     Sensory Exam   Light touch normal.     Gait, Coordination, and Reflexes     Gait  Gait: normal    Coordination   Finger to nose coordination: normal    Tremor   Resting tremor: absent  Intention tremor: absent  Action tremor: absent    Reflexes   Right brachioradialis: 2+  Left brachioradialis: 2+  Right biceps: 2+  Left biceps: 2+  Right patellar: 2+  Left patellar: 2+  Right Hernández: absent  Left Hernández: absent

## 2025-01-15 ENCOUNTER — TELEPHONE (OUTPATIENT)
Age: 75
End: 2025-01-15

## 2025-01-16 ENCOUNTER — TELEPHONE (OUTPATIENT)
Age: 75
End: 2025-01-16

## 2025-01-16 NOTE — TELEPHONE ENCOUNTER
Pt called to see if she had any appts set up.  Advised pt that she has CTA on 3/3/25 at 11:30 and follow up with us on 3/10/25 in [place] at 145p.  Patient appreciative of assistance.

## 2025-01-24 ENCOUNTER — HOSPITAL ENCOUNTER (OUTPATIENT)
Dept: MAMMOGRAPHY | Facility: HOSPITAL | Age: 75
Discharge: HOME | End: 2025-01-24
Payer: MEDICARE

## 2025-01-24 ENCOUNTER — HOSPITAL ENCOUNTER (OUTPATIENT)
Dept: BONE DENSITY | Facility: HOSPITAL | Age: 75
Discharge: HOME | End: 2025-01-24
Payer: MEDICARE

## 2025-01-24 ENCOUNTER — APPOINTMENT (OUTPATIENT)
Dept: RADIOLOGY | Facility: HOSPITAL | Age: 75
End: 2025-01-24
Payer: MEDICARE

## 2025-01-24 DIAGNOSIS — Z78.0 POSTMENOPAUSAL: ICD-10-CM

## 2025-01-24 DIAGNOSIS — Z12.31 VISIT FOR SCREENING MAMMOGRAM: ICD-10-CM

## 2025-01-24 PROCEDURE — 77063 BREAST TOMOSYNTHESIS BI: CPT

## 2025-01-24 PROCEDURE — 77067 SCR MAMMO BI INCL CAD: CPT

## 2025-01-24 PROCEDURE — 77080 DXA BONE DENSITY AXIAL: CPT

## 2025-01-27 ENCOUNTER — OFFICE VISIT (OUTPATIENT)
Dept: INTERNAL MEDICINE CLINIC | Facility: CLINIC | Age: 75
End: 2025-01-27
Payer: MEDICARE

## 2025-01-27 VITALS
OXYGEN SATURATION: 97 % | HEIGHT: 63 IN | WEIGHT: 183.3 LBS | DIASTOLIC BLOOD PRESSURE: 74 MMHG | HEART RATE: 93 BPM | TEMPERATURE: 97.9 F | SYSTOLIC BLOOD PRESSURE: 122 MMHG | BODY MASS INDEX: 32.48 KG/M2

## 2025-01-27 DIAGNOSIS — E11.9 TYPE 2 DIABETES MELLITUS WITHOUT COMPLICATION, WITHOUT LONG-TERM CURRENT USE OF INSULIN (HCC): ICD-10-CM

## 2025-01-27 DIAGNOSIS — I10 ACCELERATED ESSENTIAL HYPERTENSION: Primary | ICD-10-CM

## 2025-01-27 DIAGNOSIS — E78.5 DYSLIPIDEMIA: ICD-10-CM

## 2025-01-27 DIAGNOSIS — E55.9 VITAMIN D DEFICIENCY: ICD-10-CM

## 2025-01-27 DIAGNOSIS — I35.0 AORTIC STENOSIS, MILD: ICD-10-CM

## 2025-01-27 DIAGNOSIS — M17.11 PRIMARY OSTEOARTHRITIS OF RIGHT KNEE: ICD-10-CM

## 2025-01-27 PROCEDURE — G2211 COMPLEX E/M VISIT ADD ON: HCPCS | Performed by: FAMILY MEDICINE

## 2025-01-27 PROCEDURE — 99214 OFFICE O/P EST MOD 30 MIN: CPT | Performed by: FAMILY MEDICINE

## 2025-01-27 NOTE — PROGRESS NOTES
Name: Delmi Pedroza      : 1950      MRN: [id number]  Encounter Provider: Edelmira Noe MD  Encounter Date: 2025   Encounter department: [facility]  :  Assessment & Plan  Accelerated essential hypertension  Blood pressure currently controlled.  Continue with the losartan.  Watch salt intake.  Stay adequately hydrated.  Orders:  •  CBC and differential; Future  •  Comprehensive metabolic panel; Future    Aortic stenosis, mild  Is overdue for echocardiogram.  Order is in the system from last visit.  Encouraged her to schedule this.  Orders:  •  CBC and differential; Future    Type 2 diabetes mellitus without complication, without long-term current use of insulin (HCC)  Is overdue for hemoglobin A1c.  Prescription for laboratory testing is in the system.  Discussed with her getting this completed as soon as possible.  Discussed possible treatment options depending on the results of the laboratory testing.  Follow-up with ophthalmology regularly.  Check feet daily.  Lab Results   Component Value Date    HGBA1C 6.0 (H) 2024       Orders:  •  CBC and differential; Future  •  Comprehensive metabolic panel; Future  •  Hemoglobin A1C; Future    Primary osteoarthritis of right knee  Continues with arthritis in the joints especially the knees.  Try to remain as active as possible.  Tylenol if needed for joint symptoms.  Orders:  •  CBC and differential; Future    Dyslipidemia  Cholesterol has been elevated in the past.  Discussed medications with her.  Will await upcoming laboratory testing and decide thereafter.  Orders:  •  CBC and differential; Future  •  Comprehensive metabolic panel; Future  •  Lipid panel; Future  •  TSH, 3rd generation; Future    Vitamin D deficiency  Continue with calcium and vitamin D supplementation.  Will check vitamin D level with upcoming laboratory testing and adjust dose if needed.  Orders:  •  CBC and differential; Future  •  Vitamin D 25  "hydroxy; Future      Up-to-date on immunizations.  Up-to-date on mammogram and bone density.  Will have her follow-up in about 3 to 5 months or sooner if needed.     History of Present Illness   She presents for routine follow-up.  Has generally been doing somewhat better.  Mood appears to have stabilized.  She continues with Cymbalta and has noticed a difference.  She still feels down at times and misses her son and .  Tolerating losartan without difficulty.  Denies any headaches or localized weakness.  Denies any chest pain or palpitations.  Did not have her laboratory testing completed prior to this visit.  Tries to watch her carbohydrate intake but admits that it is difficult at times.  She continues with joint pains especially into the knees.  Tries to remain as active as possible.      Review of Systems   Constitutional:  Negative for activity change, appetite change, chills and fever.   HENT:  Negative for congestion and rhinorrhea.    Eyes:  Negative for visual disturbance.   Respiratory:  Negative for chest tightness and shortness of breath.    Cardiovascular:  Negative for chest pain and palpitations.   Gastrointestinal:  Negative for abdominal pain, blood in stool, diarrhea, nausea and vomiting.   Endocrine: Negative for polydipsia, polyphagia and polyuria.   Genitourinary:  Negative for dysuria, frequency and urgency.   Musculoskeletal:  Positive for arthralgias. Negative for gait problem.   Skin:  Negative for color change.   Neurological:  Negative for dizziness and headaches.   Hematological:  Does not bruise/bleed easily.   Psychiatric/Behavioral:  Positive for decreased concentration and dysphoric mood. Negative for confusion and sleep disturbance. The patient is nervous/anxious.        Objective   /74 (BP Location: Left arm, Patient Position: Sitting, Cuff Size: Large)   Pulse 93   Temp 97.9 °F (36.6 °C)   Ht 5' 3\" (1.6 m)   Wt 83.1 kg (183 lb 4.8 oz)   LMP  (LMP Unknown)   SpO2 " 97%   BMI 32.47 kg/m²      Physical Exam  Vitals and nursing note reviewed.   Constitutional:       General: She is not in acute distress.     Appearance: She is well-developed, well-groomed and overweight.   HENT:      Head: Normocephalic and atraumatic.   Eyes:      General:         Right eye: No discharge.         Left eye: No discharge.      Conjunctiva/sclera: Conjunctivae normal.      Pupils: Pupils are equal, round, and reactive to light.   Cardiovascular:      Rate and Rhythm: Normal rate and regular rhythm.      Heart sounds: Murmur heard.      Systolic murmur is present with a grade of 2/6.      No friction rub. No gallop.   Pulmonary:      Effort: No respiratory distress.      Breath sounds: No wheezing or rales.   Abdominal:      General: Bowel sounds are normal. There is no distension.      Tenderness: There is no abdominal tenderness.   Lymphadenopathy:      Cervical: No cervical adenopathy.   Skin:     General: Skin is warm and dry.   Neurological:      Mental Status: She is alert and oriented to person, place, and time.   Psychiatric:         Mood and Affect: Mood and affect normal.         Speech: Speech normal.         Behavior: Behavior normal. Behavior is cooperative.         Cognition and Memory: Cognition and memory normal.

## 2025-01-28 NOTE — ASSESSMENT & PLAN NOTE
Continues with arthritis in the joints especially the knees.  Try to remain as active as possible.  Tylenol if needed for joint symptoms.  Orders:  •  CBC and differential; Future

## 2025-01-28 NOTE — ASSESSMENT & PLAN NOTE
Cholesterol has been elevated in the past.  Discussed medications with her.  Will await upcoming laboratory testing and decide thereafter.  Orders:  •  CBC and differential; Future  •  Comprehensive metabolic panel; Future  •  Lipid panel; Future  •  TSH, 3rd generation; Future

## 2025-01-28 NOTE — ASSESSMENT & PLAN NOTE
Continue with calcium and vitamin D supplementation.  Will check vitamin D level with upcoming laboratory testing and adjust dose if needed.  Orders:  •  CBC and differential; Future  •  Vitamin D 25 hydroxy; Future

## 2025-01-28 NOTE — ASSESSMENT & PLAN NOTE
Is overdue for echocardiogram.  Order is in the system from last visit.  Encouraged her to schedule this.  Orders:  •  CBC and differential; Future

## 2025-01-28 NOTE — ASSESSMENT & PLAN NOTE
Is overdue for hemoglobin A1c.  Prescription for laboratory testing is in the system.  Discussed with her getting this completed as soon as possible.  Discussed possible treatment options depending on the results of the laboratory testing.  Follow-up with ophthalmology regularly.  Check feet daily.  Lab Results   Component Value Date    HGBA1C 6.0 (H) 03/05/2024       Orders:  •  CBC and differential; Future  •  Comprehensive metabolic panel; Future  •  Hemoglobin A1C; Future

## 2025-01-28 NOTE — ASSESSMENT & PLAN NOTE
Blood pressure currently controlled.  Continue with the losartan.  Watch salt intake.  Stay adequately hydrated.  Orders:  •  CBC and differential; Future  •  Comprehensive metabolic panel; Future

## 2025-01-29 ENCOUNTER — RESULTS FOLLOW-UP (OUTPATIENT)
Dept: INTERNAL MEDICINE CLINIC | Facility: CLINIC | Age: 75
End: 2025-01-29

## 2025-01-29 DIAGNOSIS — I10 HYPERTENSION: ICD-10-CM

## 2025-01-29 NOTE — TELEPHONE ENCOUNTER
Reason for call:   [x] Refill   [] Prior Auth  [] Other:     Office:   [x] PCP/Provider -   [] Specialty/Provider -     Medication: losartan (COZAAR) 100 MG       Dose/Frequency: Take 1 tablet (100 mg total) by mouth daily     Quantity: 90    Pharmacy: [facility] - [address], [address]     Does the patient have enough for 3 days?   [x] Yes   [] No - Send as HP to POD

## 2025-01-30 RX ORDER — LOSARTAN POTASSIUM 100 MG/1
100 TABLET ORAL DAILY
Qty: 90 TABLET | Refills: 1 | Status: SHIPPED | OUTPATIENT
Start: 2025-01-30

## 2025-03-03 ENCOUNTER — HOSPITAL ENCOUNTER (OUTPATIENT)
Dept: CT IMAGING | Facility: HOSPITAL | Age: 75
Discharge: HOME | End: 2025-03-03
Payer: MEDICARE

## 2025-03-03 ENCOUNTER — APPOINTMENT (OUTPATIENT)
Dept: LAB | Facility: HOSPITAL | Age: 75
End: 2025-03-03
Payer: MEDICARE

## 2025-03-03 DIAGNOSIS — I67.1 CEREBRAL ANEURYSM: ICD-10-CM

## 2025-03-03 LAB
BUN SERPL-MCNC: 18 MG/DL (ref 5–25)
CREAT SERPL-MCNC: 0.84 MG/DL (ref 0.6–1.3)
GFR SERPL CREATININE-BSD FRML MDRD: 67 ML/MIN/1.73SQ M

## 2025-03-03 PROCEDURE — 70496 CT ANGIOGRAPHY HEAD: CPT

## 2025-03-03 PROCEDURE — 84520 ASSAY OF UREA NITROGEN: CPT

## 2025-03-03 PROCEDURE — 82565 ASSAY OF CREATININE: CPT

## 2025-03-03 PROCEDURE — 36415 COLL VENOUS BLD VENIPUNCTURE: CPT

## 2025-03-03 RX ADMIN — IOHEXOL 85 ML: 350 INJECTION, SOLUTION INTRAVENOUS at 12:29

## 2025-04-04 NOTE — ASSESSMENT & PLAN NOTE
Anxiety symptoms especially with recent financial stressors discussed.  Methods for stress relief discussed.  Continue with the Cymbalta.  Sleep issues discussed.  Continue with the Remeron.   Patient requests all Lab, Cardiology, and Radiology Results on their Discharge Instructions

## 2025-04-21 DIAGNOSIS — F41.8 DEPRESSION WITH ANXIETY: ICD-10-CM

## 2025-04-21 RX ORDER — DULOXETIN HYDROCHLORIDE 30 MG/1
30 CAPSULE, DELAYED RELEASE ORAL DAILY
Qty: 90 CAPSULE | Refills: 1 | Status: SHIPPED | OUTPATIENT
Start: 2025-04-21

## 2025-05-14 ENCOUNTER — OFFICE VISIT (OUTPATIENT)
Dept: NEUROSURGERY | Facility: CLINIC | Age: 75
End: 2025-05-14
Payer: MEDICARE

## 2025-05-14 VITALS
OXYGEN SATURATION: 98 % | BODY MASS INDEX: 32.43 KG/M2 | HEART RATE: 67 BPM | SYSTOLIC BLOOD PRESSURE: 128 MMHG | WEIGHT: 183 LBS | TEMPERATURE: 97.6 F | RESPIRATION RATE: 18 BRPM | DIASTOLIC BLOOD PRESSURE: 76 MMHG | HEIGHT: 63 IN

## 2025-05-14 DIAGNOSIS — I67.1 CEREBRAL ANEURYSM: Primary | ICD-10-CM

## 2025-05-14 PROCEDURE — 99214 OFFICE O/P EST MOD 30 MIN: CPT | Performed by: NEUROLOGICAL SURGERY

## 2025-05-14 NOTE — ASSESSMENT & PLAN NOTE
1.  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  She has not had any other neurologic changes.  We discussed the natural history and diagnosis of aneurysms as well as the risk of subarachnoid hemorrhage based on size and location.  According to ISIUA her risk of hemorrhage is approximately 0.5% annually or 1.5% annually according to UCAS.  We discussed briefly the limitations of both the studies.    Ultimately repair of this aneurysm require open craniotomy and clipping as there is no good endovascular option.  She is not interested in this option and as such we will follow her as needed.    Her daughter is with her today and has significant concern regarding her risk of aneurysm formation.  We discussed indications for screening and I have provided her with documentation so an MRA can be ordered for her and her sisters as necessary.

## 2025-05-14 NOTE — PROGRESS NOTES
Name: Delmi Pedroza      : 1950      MRN: [id number]  Encounter Provider: Jossue La MD  Encounter Date: 2025   Encounter department: [facility] [place]  :  Assessment & Plan  Cerebral aneurysm  1.  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  She has not had any other neurologic changes.  We discussed the natural history and diagnosis of aneurysms as well as the risk of subarachnoid hemorrhage based on size and location.  According to ISIUA her risk of hemorrhage is approximately 0.5% annually or 1.5% annually according to UCAS.  We discussed briefly the limitations of both the studies.    Ultimately repair of this aneurysm require open craniotomy and clipping as there is no good endovascular option.  She is not interested in this option and as such we will follow her as needed.    Her daughter is with her today and has significant concern regarding her risk of aneurysm formation.  We discussed indications for screening and I have provided her with documentation so an MRA can be ordered for her and her sisters as necessary.           History of Present Illness     This is a very pleasant 75-year-old female who presented to [facility] after a trauma and falling on her head.  She suffered multiple rib fractures and an incidental MCA aneurysm was discovered at that time.  She underwent formal arteriogram in 2018 and has been followed up intermittently since then.  She denies any new neurologic changes.    Her past medical history is significant for hypertension, and hyperlipidemia.  Her past surgical history is significant for hysterectomy and carpal tunnel surgery.    She is recently .  She has 5 children ages 47-66.  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   No history of alcohol or tobacco use.    She is allergic to penicillin.    No family history of ruptured aneurysm or intracranial hemorrhage.  She did have a child die at a young age from potential brain hemorrhage.    Review of Systems   HENT:  Negative for tinnitus.    Eyes:  Negative for visual disturbance.   Gastrointestinal: Negative.    Genitourinary: Negative.    Musculoskeletal:  Negative for gait problem.   Neurological:  Negative for dizziness, tremors, seizures, syncope, speech difficulty, weakness, numbness and headaches.   Hematological:  Does not bruise/bleed easily.   Psychiatric/Behavioral:  Negative for confusion, decreased concentration and sleep disturbance.      I have personally reviewed the MA's review of systems and made changes as necessary.    Past Medical History   Past Medical History:   Diagnosis Date    Arthritis     Carpal tunnel syndrome     unspecified laterality    Cataract     Cerebral aneurysm     Decreased body height     last assessed 3/17/14    Head trauma 2021    Heart murmur     Hypercholesteremia     Hypertension     Motion sickness     Obesity     Periodic heart flutter     Pneumonia     x1    PONV (postoperative nausea and vomiting)     Rosacea     Vertigo     Wears glasses     Wears partial dentures     upper     Past Surgical History:   Procedure Laterality Date    CARPAL TUNNEL RELEASE Bilateral      SECTION      x1    COLONOSCOPY      IR BIOPSY LYMPH NODE  2018    IR CEREBRAL ANGIOGRAPHY  10/12/2018    IL LAPS TOTAL HYSTERECT 250 GM/< W/RMVL TUBE/OVARY N/A 2017    Procedure: HYSTERECTOMY LAPAROSCOPIC TOTAL, BSO, Cystoscopy;  Surgeon: Naida Pablo MD;  Location: [facility];  Service: Gynecology    IL NEUROPLASTY &/TRANSPOS MEDIAN NRV CARPAL TUNNE Left 2016    Procedure: RELEASE CARPAL TUNNEL;  Surgeon: Jesus Posada MD;  Location: [facility];  Service: Orthopedics     Family History   Problem Relation Age of Onset    Arthritis Mother   "   Hypertension Mother     Stroke Mother         syndrome    Diabetes Father     Sudden death Father         instantaneous cardiac    Hyperlipidemia Sister     Colon cancer Sister 79    Hodgkin's lymphoma Daughter 21    No Known Problems Daughter     No Known Problems Daughter     No Known Problems Daughter     No Known Problems Maternal Grandmother     No Known Problems Maternal Grandfather     No Known Problems Paternal Grandmother     No Known Problems Paternal Grandfather     Sudden death Brother         instananeous cardiac    No Known Problems Brother     Brain cancer Son         glioblastoma    No Known Problems Son     No Known Problems Maternal Aunt     No Known Problems Paternal Aunt     No Known Problems Paternal Aunt      she reports that she has never smoked. She has never used smokeless tobacco. She reports that she does not drink alcohol and does not use drugs.  Current Outpatient Medications   Medication Instructions    CALCIUM CARBONATE-VITAMIN D PO 1 tablet, Daily    donepezil (ARICEPT) 5 mg, Oral, Daily at bedtime    DULoxetine (CYMBALTA) 30 mg, Oral, Daily    losartan (COZAAR) 100 mg, Oral, Daily   Allergies[1]   Objective   /76 (BP Location: Right arm, Patient Position: Sitting, Cuff Size: Standard)   Pulse 67   Temp 97.6 °F (36.4 °C) (Temporal)   Resp 18   Ht 5' 3\" (1.6 m)   Wt 83 kg (183 lb)   LMP  (LMP Unknown)   SpO2 98%   BMI 32.42 kg/m²     Physical Exam  Neurological Exam  She is well appearing.  Affect is appropriate. Body mass index is 32.42 kg/m².. She is awake alert and oriented.  Hearing and vision are grossly intact.   Her pupils are equal round reactive to light.  Her extraocular movements are intact.  Her face is symmetric.  Tongue is midline.  Facial sensation is intact and symmetric throughout.  Shoulder shrug is 5/5.  There is no drift or dysmetria.     She has full strength in her bilateral upper and lower extremities.  She has normal muscle tone muscle bulk.  " Her biceps reflexes and patellar reflexes are 2+ and symmetric.  Bree sign negative bilaterally.  Sensation intact to light touch and pinprick throughout.  Her gait is normal.     Her heart rate is regular.  Normal respiratory effort.  Abdomen nondistended.  Radial pulses 2+.     We reviewed her CTA in detail and compared it to her prior imaging.    Administrative Statements   I have spent a total time of 30 minutes in caring for this patient on the day of the visit/encounter including Diagnostic results, Prognosis, Risks and benefits of tx options, Instructions for management, Patient and family education, Importance of tx compliance, Risk factor reductions, Impressions, Counseling / Coordination of care, Documenting in the medical record, Reviewing/placing orders in the medical record (including tests, medications, and/or procedures), and Obtaining or reviewing history  .           [1]   Allergies  Allergen Reactions    Penicillins Hives and Rash    Rosuvastatin Myalgia     Leg pain

## 2025-08-11 ENCOUNTER — OFFICE VISIT (OUTPATIENT)
Dept: INTERNAL MEDICINE CLINIC | Facility: CLINIC | Age: 75
End: 2025-08-11
Payer: MEDICARE

## 2025-08-11 PROBLEM — S22.42XD CLOSED FRACTURE OF MULTIPLE RIBS OF LEFT SIDE WITH ROUTINE HEALING: Status: RESOLVED | Noted: 2018-07-04 | Resolved: 2025-08-11

## (undated) DEVICE — LAPAROSCOPIC SMOKE EVAC TUBING

## (undated) DEVICE — GLOVE SRG BIOGEL ECLIPSE 7

## (undated) DEVICE — SYRINGE 50ML LL

## (undated) DEVICE — DRAPE SHEET THREE QUARTER

## (undated) DEVICE — CHLORAPREP HI-LITE 26ML ORANGE

## (undated) DEVICE — TUBING SUCTION 5MM X 12 FT

## (undated) DEVICE — STANDARD SURGICAL GOWN, L: Brand: CONVERTORS

## (undated) DEVICE — GLOVE SRG BIOGEL 6.5

## (undated) DEVICE — BLUE HEAT SCOPE WARMER

## (undated) DEVICE — MAYO STAND COVER: Brand: CONVERTORS

## (undated) DEVICE — INTENDED FOR TISSUE SEPARATION, AND OTHER PROCEDURES THAT REQUIRE A SHARP SURGICAL BLADE TO PUNCTURE OR CUT.: Brand: BARD-PARKER SAFETY BLADES SIZE 11, STERILE

## (undated) DEVICE — 3000CC GUARDIAN II: Brand: GUARDIAN

## (undated) DEVICE — [HIGH FLOW INSUFFLATOR,  DO NOT USE IF PACKAGE IS DAMAGED,  KEEP DRY,  KEEP AWAY FROM SUNLIGHT,  PROTECT FROM HEAT AND RADIOACTIVE SOURCES.]: Brand: PNEUMOSURE

## (undated) DEVICE — ENDOPATH XCEL BLADELESS TROCARS WITH STABILITY SLEEVES: Brand: ENDOPATH XCEL

## (undated) DEVICE — GLOVE SRG BIOGEL 7

## (undated) DEVICE — ENSEAL LAPAROSCOPIC TISSUE SEALER G2 CURVED JAW FOR USE WITH G2 GENERATOR 5MM DIAMETER 35CM SHAFT LENGTH: Brand: ENSEAL

## (undated) DEVICE — UTERINE MANIPULATOR RUMI 5.1 X 6 CM

## (undated) DEVICE — IRRIG ENDO FLO TUBING

## (undated) DEVICE — ENDOPATH XCEL UNIVERSAL TROCAR STABLILITY SLEEVES: Brand: ENDOPATH XCEL

## (undated) DEVICE — ADHESIVE SKN CLSR HISTOACRYL FLEX 0.5ML LF

## (undated) DEVICE — MEDI-VAC YANKAUER SUCTION HANDLE W/STRAIGHT TIP & CONTROL VENT: Brand: CARDINAL HEALTH

## (undated) DEVICE — INTENDED FOR TISSUE SEPARATION, AND OTHER PROCEDURES THAT REQUIRE A SHARP SURGICAL BLADE TO PUNCTURE OR CUT.: Brand: BARD-PARKER SAFETY BLADES SIZE 15, STERILE

## (undated) DEVICE — SUT MONOCRYL 4-0 PS-2 27 IN Y426H

## (undated) DEVICE — COTTON TIP APPLICTOR 2 PK

## (undated) DEVICE — UNIVERSAL GYN LAPAROSCOPY,KIT: Brand: CARDINAL HEALTH

## (undated) DEVICE — TRAY FOLEY 16FR URIMETER SURESTEP

## (undated) DEVICE — GLOVE INDICATOR PI UNDERGLOVE SZ 7.5 BLUE

## (undated) DEVICE — SUT VICRYL 0 CT-1 CR/8 27 IN JJ41G

## (undated) DEVICE — AEM CORD

## (undated) DEVICE — SUT VICRYL 0 CT-1 36 IN J946H

## (undated) DEVICE — PREMIUM DRY TRAY LF: Brand: MEDLINE INDUSTRIES, INC.

## (undated) DEVICE — OCCLUDER COLPO-PNEUMO

## (undated) DEVICE — SCD SEQUENTIAL COMPRESSION COMFORT SLEEVE MEDIUM KNEE LENGTH: Brand: KENDALL SCD

## (undated) DEVICE — GLOVE INDICATOR PI UNDERGLOVE SZ 7 BLUE

## (undated) DEVICE — IV EXTENSION TUBING 33 IN